# Patient Record
Sex: FEMALE | Race: WHITE | NOT HISPANIC OR LATINO | URBAN - METROPOLITAN AREA
[De-identification: names, ages, dates, MRNs, and addresses within clinical notes are randomized per-mention and may not be internally consistent; named-entity substitution may affect disease eponyms.]

---

## 2023-04-20 ENCOUNTER — INPATIENT (INPATIENT)
Facility: HOSPITAL | Age: 78
LOS: 7 days | Discharge: NURSING, REHAB OR LONG-TERM CARE | DRG: 987 | End: 2023-04-28
Attending: HOSPITALIST | Admitting: INTERNAL MEDICINE
Payer: MEDICARE

## 2023-04-20 VITALS
WEIGHT: 199.96 LBS | DIASTOLIC BLOOD PRESSURE: 70 MMHG | RESPIRATION RATE: 14 BRPM | TEMPERATURE: 97 F | HEIGHT: 66 IN | HEART RATE: 94 BPM | OXYGEN SATURATION: 98 % | SYSTOLIC BLOOD PRESSURE: 122 MMHG

## 2023-04-20 DIAGNOSIS — R60.0 LOCALIZED EDEMA: ICD-10-CM

## 2023-04-20 LAB
ANION GAP SERPL CALC-SCNC: 10 MMOL/L — SIGNIFICANT CHANGE UP (ref 7–14)
APPEARANCE UR: ABNORMAL
BACTERIA # UR AUTO: ABNORMAL
BASOPHILS # BLD AUTO: 0.07 K/UL — SIGNIFICANT CHANGE UP (ref 0–0.2)
BASOPHILS NFR BLD AUTO: 0.8 % — SIGNIFICANT CHANGE UP (ref 0–1)
BILIRUB UR-MCNC: NEGATIVE — SIGNIFICANT CHANGE UP
BUN SERPL-MCNC: 12 MG/DL — SIGNIFICANT CHANGE UP (ref 10–20)
CALCIUM SERPL-MCNC: 8.1 MG/DL — LOW (ref 8.4–10.4)
CHLORIDE SERPL-SCNC: 111 MMOL/L — HIGH (ref 98–110)
CK SERPL-CCNC: 332 U/L — HIGH (ref 0–225)
CO2 SERPL-SCNC: 22 MMOL/L — SIGNIFICANT CHANGE UP (ref 17–32)
COLOR SPEC: YELLOW — SIGNIFICANT CHANGE UP
CREAT SERPL-MCNC: 0.6 MG/DL — LOW (ref 0.7–1.5)
DIFF PNL FLD: ABNORMAL
EGFR: 92 ML/MIN/1.73M2 — SIGNIFICANT CHANGE UP
EOSINOPHIL # BLD AUTO: 0.11 K/UL — SIGNIFICANT CHANGE UP (ref 0–0.7)
EOSINOPHIL NFR BLD AUTO: 1.3 % — SIGNIFICANT CHANGE UP (ref 0–8)
EPI CELLS # UR: 4 /HPF — SIGNIFICANT CHANGE UP (ref 0–5)
GLUCOSE SERPL-MCNC: 88 MG/DL — SIGNIFICANT CHANGE UP (ref 70–99)
GLUCOSE UR QL: NEGATIVE — SIGNIFICANT CHANGE UP
HCT VFR BLD CALC: 27.2 % — LOW (ref 37–47)
HGB BLD-MCNC: 7.8 G/DL — LOW (ref 12–16)
HYALINE CASTS # UR AUTO: 1 /LPF — SIGNIFICANT CHANGE UP (ref 0–7)
IMM GRANULOCYTES NFR BLD AUTO: 0.5 % — HIGH (ref 0.1–0.3)
KETONES UR-MCNC: ABNORMAL
LEUKOCYTE ESTERASE UR-ACNC: ABNORMAL
LYMPHOCYTES # BLD AUTO: 1.5 K/UL — SIGNIFICANT CHANGE UP (ref 1.2–3.4)
LYMPHOCYTES # BLD AUTO: 17.5 % — LOW (ref 20.5–51.1)
MCHC RBC-ENTMCNC: 22.2 PG — LOW (ref 27–31)
MCHC RBC-ENTMCNC: 28.7 G/DL — LOW (ref 32–37)
MCV RBC AUTO: 77.3 FL — LOW (ref 81–99)
MONOCYTES # BLD AUTO: 0.81 K/UL — HIGH (ref 0.1–0.6)
MONOCYTES NFR BLD AUTO: 9.4 % — HIGH (ref 1.7–9.3)
NEUTROPHILS # BLD AUTO: 6.05 K/UL — SIGNIFICANT CHANGE UP (ref 1.4–6.5)
NEUTROPHILS NFR BLD AUTO: 70.5 % — SIGNIFICANT CHANGE UP (ref 42.2–75.2)
NITRITE UR-MCNC: POSITIVE
NRBC # BLD: 0 /100 WBCS — SIGNIFICANT CHANGE UP (ref 0–0)
NT-PROBNP SERPL-SCNC: HIGH PG/ML (ref 0–300)
PH UR: 6 — SIGNIFICANT CHANGE UP (ref 5–8)
PLATELET # BLD AUTO: 524 K/UL — HIGH (ref 130–400)
PMV BLD: 9.3 FL — SIGNIFICANT CHANGE UP (ref 7.4–10.4)
POTASSIUM SERPL-MCNC: 3.7 MMOL/L — SIGNIFICANT CHANGE UP (ref 3.5–5)
POTASSIUM SERPL-SCNC: 3.7 MMOL/L — SIGNIFICANT CHANGE UP (ref 3.5–5)
PROT UR-MCNC: ABNORMAL
RBC # BLD: 3.52 M/UL — LOW (ref 4.2–5.4)
RBC # FLD: 19.6 % — HIGH (ref 11.5–14.5)
RBC CASTS # UR COMP ASSIST: >720 /HPF — HIGH (ref 0–4)
SARS-COV-2 RNA SPEC QL NAA+PROBE: SIGNIFICANT CHANGE UP
SODIUM SERPL-SCNC: 143 MMOL/L — SIGNIFICANT CHANGE UP (ref 135–146)
SP GR SPEC: 1.02 — SIGNIFICANT CHANGE UP (ref 1.01–1.03)
TROPONIN T SERPL-MCNC: 0.06 NG/ML — CRITICAL HIGH
UROBILINOGEN FLD QL: ABNORMAL
WBC # BLD: 8.58 K/UL — SIGNIFICANT CHANGE UP (ref 4.8–10.8)
WBC # FLD AUTO: 8.58 K/UL — SIGNIFICANT CHANGE UP (ref 4.8–10.8)
WBC UR QL: 320 /HPF — HIGH (ref 0–5)

## 2023-04-20 PROCEDURE — 82728 ASSAY OF FERRITIN: CPT

## 2023-04-20 PROCEDURE — 84466 ASSAY OF TRANSFERRIN: CPT

## 2023-04-20 PROCEDURE — 93307 TTE W/O DOPPLER COMPLETE: CPT

## 2023-04-20 PROCEDURE — 74177 CT ABD & PELVIS W/CONTRAST: CPT

## 2023-04-20 PROCEDURE — 85045 AUTOMATED RETICULOCYTE COUNT: CPT

## 2023-04-20 PROCEDURE — 83605 ASSAY OF LACTIC ACID: CPT

## 2023-04-20 PROCEDURE — 83550 IRON BINDING TEST: CPT

## 2023-04-20 PROCEDURE — 83010 ASSAY OF HAPTOGLOBIN QUANT: CPT

## 2023-04-20 PROCEDURE — 84484 ASSAY OF TROPONIN QUANT: CPT

## 2023-04-20 PROCEDURE — 93005 ELECTROCARDIOGRAM TRACING: CPT

## 2023-04-20 PROCEDURE — 84132 ASSAY OF SERUM POTASSIUM: CPT

## 2023-04-20 PROCEDURE — 76705 ECHO EXAM OF ABDOMEN: CPT

## 2023-04-20 PROCEDURE — 88305 TISSUE EXAM BY PATHOLOGIST: CPT

## 2023-04-20 PROCEDURE — 83036 HEMOGLOBIN GLYCOSYLATED A1C: CPT

## 2023-04-20 PROCEDURE — U0005: CPT

## 2023-04-20 PROCEDURE — 82962 GLUCOSE BLOOD TEST: CPT

## 2023-04-20 PROCEDURE — 83735 ASSAY OF MAGNESIUM: CPT

## 2023-04-20 PROCEDURE — 86850 RBC ANTIBODY SCREEN: CPT

## 2023-04-20 PROCEDURE — 80061 LIPID PANEL: CPT

## 2023-04-20 PROCEDURE — 83540 ASSAY OF IRON: CPT

## 2023-04-20 PROCEDURE — 88341 IMHCHEM/IMCYTCHM EA ADD ANTB: CPT

## 2023-04-20 PROCEDURE — 87640 STAPH A DNA AMP PROBE: CPT

## 2023-04-20 PROCEDURE — 97535 SELF CARE MNGMENT TRAINING: CPT | Mod: GO

## 2023-04-20 PROCEDURE — 71045 X-RAY EXAM CHEST 1 VIEW: CPT | Mod: 26

## 2023-04-20 PROCEDURE — 97110 THERAPEUTIC EXERCISES: CPT | Mod: GP

## 2023-04-20 PROCEDURE — 86901 BLOOD TYPING SEROLOGIC RH(D): CPT

## 2023-04-20 PROCEDURE — 82607 VITAMIN B-12: CPT

## 2023-04-20 PROCEDURE — 84295 ASSAY OF SERUM SODIUM: CPT

## 2023-04-20 PROCEDURE — 86900 BLOOD TYPING SEROLOGIC ABO: CPT

## 2023-04-20 PROCEDURE — 85025 COMPLETE CBC W/AUTO DIFF WBC: CPT

## 2023-04-20 PROCEDURE — 82553 CREATINE MB FRACTION: CPT

## 2023-04-20 PROCEDURE — 87493 C DIFF AMPLIFIED PROBE: CPT

## 2023-04-20 PROCEDURE — 85027 COMPLETE CBC AUTOMATED: CPT

## 2023-04-20 PROCEDURE — 71045 X-RAY EXAM CHEST 1 VIEW: CPT

## 2023-04-20 PROCEDURE — 85014 HEMATOCRIT: CPT

## 2023-04-20 PROCEDURE — 85018 HEMOGLOBIN: CPT

## 2023-04-20 PROCEDURE — 87641 MR-STAPH DNA AMP PROBE: CPT

## 2023-04-20 PROCEDURE — 93880 EXTRACRANIAL BILAT STUDY: CPT

## 2023-04-20 PROCEDURE — 97166 OT EVAL MOD COMPLEX 45 MIN: CPT | Mod: GO

## 2023-04-20 PROCEDURE — 80053 COMPREHEN METABOLIC PANEL: CPT

## 2023-04-20 PROCEDURE — 99285 EMERGENCY DEPT VISIT HI MDM: CPT

## 2023-04-20 PROCEDURE — 76856 US EXAM PELVIC COMPLETE: CPT

## 2023-04-20 PROCEDURE — U0003: CPT

## 2023-04-20 PROCEDURE — 99223 1ST HOSP IP/OBS HIGH 75: CPT

## 2023-04-20 PROCEDURE — 71275 CT ANGIOGRAPHY CHEST: CPT

## 2023-04-20 PROCEDURE — 82330 ASSAY OF CALCIUM: CPT

## 2023-04-20 PROCEDURE — 82803 BLOOD GASES ANY COMBINATION: CPT

## 2023-04-20 PROCEDURE — 36415 COLL VENOUS BLD VENIPUNCTURE: CPT

## 2023-04-20 PROCEDURE — 97162 PT EVAL MOD COMPLEX 30 MIN: CPT | Mod: GP

## 2023-04-20 PROCEDURE — 88360 TUMOR IMMUNOHISTOCHEM/MANUAL: CPT

## 2023-04-20 PROCEDURE — 86923 COMPATIBILITY TEST ELECTRIC: CPT

## 2023-04-20 PROCEDURE — 88342 IMHCHEM/IMCYTCHM 1ST ANTB: CPT

## 2023-04-20 PROCEDURE — P9016: CPT

## 2023-04-20 PROCEDURE — 84100 ASSAY OF PHOSPHORUS: CPT

## 2023-04-20 PROCEDURE — 93970 EXTREMITY STUDY: CPT | Mod: 26

## 2023-04-20 PROCEDURE — 36430 TRANSFUSION BLD/BLD COMPNT: CPT

## 2023-04-20 PROCEDURE — 74174 CTA ABD&PLVS W/CONTRAST: CPT

## 2023-04-20 PROCEDURE — 82746 ASSAY OF FOLIC ACID SERUM: CPT

## 2023-04-20 NOTE — ED PROVIDER NOTE - OBJECTIVE STATEMENT
No Pt is a 77 y.o Female with PMHx of HTN, atrial tachycardia, chronic venous stasis, OA BIBEMS from assisted living for chief complaint of fall. Pt states she is here because her doctor told her to come in for evaluation of her legs which she states have been swelling up. Per pt, she did fall earlier today but she was sliding to the side of her couch and landed on her buttock. Denies any pain, head injury or being on any blood thinners. Pt denies any urinary or bowel incontinence , back or neck pain, numbness, tingling or focal weakness. pt states she has been experiencing difficulty walking because of her legs being swollen.. Pt states her legs are seeping fluid. Denies any cough, SOB, CP dysuria or fevers. Pt is a 77 y.o Female with PMHx of HTN, atrial stenosis, atrial tachycardia, chronic venous stasis, OA BIBEMS from assisted living for chief complaint of fall. Pt states she is here because her doctor told her to come in for evaluation of her legs which she states have been swelling up. Per pt, she did fall earlier today but she was sliding to the side of her couch and landed on her buttock. Denies any pain, head injury or being on any blood thinners. Pt denies any urinary or bowel incontinence , back or neck pain, numbness, tingling or focal weakness. pt states she has been experiencing difficulty walking because of her legs being swollen.. Pt states her legs are seeping fluid. Denies any cough, SOB, CP dysuria or fevers.

## 2023-04-20 NOTE — ED PROVIDER NOTE - CLINICAL SUMMARY MEDICAL DECISION MAKING FREE TEXT BOX
77-year-old female presents the emergency department for lower extremity swelling with deconditioning and secondary fall.  Is noncompliant with outpatient regimen for wound care and leg edema.  Daughter is bedside states patient has limited medical literacy due to dementia.  No other symptoms.  Daughter states she is uncomfortable with the patient's care at the skilled nursing facility due to her recurrence of symptoms, prefers inpatient management, states she cannot get the patient to the outpatient visits if discharged. Had screening labs imaging medications and reevaluation, plan is for inpatient admission for continued management.

## 2023-04-20 NOTE — ED PROVIDER NOTE - PHYSICAL EXAMINATION
CONSTITUTIONAL: NAD  SKIN: Warm dry  HEAD: NCAT  EYES: NL inspection  ENT: MMM  NECK: Supple; non tender. no cervical ttp no stepoffs  CARD: RRR  RESP: CTAB  ABD: S/NT no R/G  BACK: nontender no midline ttp no step offs noted  EXT: + b/l lower symmetrical extremity pitting edema 2+ with overlying dry skin changes. No warmth to touch or calf ttp. sensation intact motor 5/5  NEURO: Grossly unremarkable CN II-XIII  PSYCH: Cooperative, appropriate.

## 2023-04-20 NOTE — ED ADULT NURSE NOTE - INTERVENTIONS DEFINITIONS
[place] to call system/Call bell, personal items and telephone within reach/Instruct patient to call for assistance/Physically safe environment: no spills, clutter or unnecessary equipment/Stretcher in lowest position, wheels locked, appropriate side rails in place/Provide visual cue, wrist band, yellow gown, etc./Monitor gait and stability/Monitor for mental status changes and reorient to person, place, and time/Reinforce activity limits and safety measures with patient and family

## 2023-04-20 NOTE — ED PROVIDER NOTE - CARE PLAN
1 Principal Discharge DX:	Bilateral lower extremity edema  Secondary Diagnosis:	Elevated troponin  Secondary Diagnosis:	Acute UTI  Secondary Diagnosis:	Unable to ambulate

## 2023-04-20 NOTE — ED ADULT TRIAGE NOTE - CHIEF COMPLAINT QUOTE
pt bibems from assisted living for a trip and fall co weakness , denies head trauma and AC. typically ambulates with walker was not ambulatory on scene

## 2023-04-20 NOTE — CONSULT NOTE ADULT - SUBJECTIVE AND OBJECTIVE BOX
VASCULAR SURGERY CONSULT NOTE      HPI: Pt is a 77 y.o Female with PMHx of HTN, atrial tachycardia, aortic stenosis, chronic venous stasis, OA BIBEMS from assisted living for chief complaint of fall. Patient presents with her daughter and states she is here because she was unable to make an appointment with Dr. Mathis in the office earlier today due to worsening lower extremity swelling. The patient's daughter reports that she is a long term patient of Dr. Mathis and that she has no vascular surgical history in the past but that he has been wrapping her bilateral extremities in Unna boots. Patient reports that she did fall earlier today but she was sliding to the side of her couch and landed on her buttock. Denies any pain, head injury or being on any blood thinners. Pateint denies any urinary or bowel incontinence , back or neck pain, numbness, tingling or focal weakness. Patient states she has been experiencing difficulty walking because of her legs being swollen. Pt states her legs are seeping fluid. Denies any cough, SOB, CP dysuria or fevers.        PAST MEDICAL & SURGICAL HISTORY:    Allergy Status Unknown    Home Medications:    No permtinent family history of PVD    REVIEW OF SYSTEMS:  GENERAL:                                         negative  SKIN:                                                 negative  OPTHALMOLOGIC:                          negative  ENMT:                                               negative  RESPIRATORY AND THORAX:        negative  CARDIOVASCULAR:                         negative  GASTROINTESTINAL:                       negative  NEPHROLOGY:                                  negative  MUSCULOSKELETAL:                       negative  NEUROLOGIC:                                   negative  PSYCHIATRIC:                                    negative  HEMATOLOGY/LYMPHATICS:         negative  ENDOCRINE:                                     negative  ALLERGIC/IMMUNOLOGIC:            negative    12 point ROS otherwise normal except as stated in HPI    PHYSICAL EXAM  Vital Signs Last 24 Hrs  T(C): 36.3 (20 Apr 2023 14:27), Max: 36.3 (20 Apr 2023 14:27)  T(F): 97.3 (20 Apr 2023 14:27), Max: 97.3 (20 Apr 2023 14:27)  HR: 94 (20 Apr 2023 14:27) (94 - 94)  BP: 122/70 (20 Apr 2023 14:27) (122/70 - 122/70)  BP(mean): --  RR: 14 (20 Apr 2023 14:27) (14 - 14)  SpO2: 98% (20 Apr 2023 14:27) (98% - 98%)    Parameters below as of 20 Apr 2023 14:27  Patient On (Oxygen Delivery Method): room air        Appearance: Normal		  Cardiovascular: Normal S1 S2  Respiratory: Lungs clear to auscultation  Gastrointestinal:  Soft, Non-tender, positive BS	  Skin: No rashes, No ecchymoses, No cyanosis  Extremities: Normal range of motion, No clubbing, cyanosis. Bilateral legs wrapped in unna boots bilaterally, significant swelling of BLE  Vascular: Peripheral pulses palpable 2+ bilaterally  Neurologic: Non-focal        PULSES:  Femoral: palpable bilaterally  Dorsal Pedal: palpable bilaterally  Posterior Tibial: palpable bilaterally  Capillary: delayed refill    MEDICATIONS:   MEDICATIONS  (STANDING):    MEDICATIONS  (PRN):      LAB/STUDIES:                        7.8    8.58  )-----------( 524      ( 20 Apr 2023 17:03 )             27.2     04-20    143  |  111<H>  |  12  ----------------------------<  88  3.7   |  22  |  0.6<L>    Ca    8.1<L>      20 Apr 2023 17:03              CARDIAC MARKERS ( 20 Apr 2023 17:15 )  x     / 0.06 ng/mL / x     / x     / x      CARDIAC MARKERS ( 20 Apr 2023 17:03 )  x     / x     / 332 U/L / x     / x            IMAGING:  Pending

## 2023-04-20 NOTE — ED PROVIDER NOTE - CONSIDERATION OF ADMISSION OBSERVATION
Patient will be admitted for inability to ambulate and inability get outpatient management,  Due to cognitive impairment Consideration of Admission/Observation

## 2023-04-20 NOTE — ED ADULT NURSE REASSESSMENT NOTE - NS ED NURSE REASSESS COMMENT FT1
Pt alert and oriented x3. Airway patent with equal respirations. No distress noted. Arom x 4 extremities. Upon attempting to place pt on bed pan, pt found to be sitting in urine in a very old diaper that has tears and absorbent beads from the diapers scattered all over the pt perineal area and thighs,  Pt unable to remember how long she was in this diaper. Pt has redness and irritation thought-out the perineal area and is experiencing pain while being cleaned. Pt cleaned and changed. Urine sample obtained, Placed on prima fit. Educated on prima fit usage. Pt made comfortable. Pt very grateful for the assistance.

## 2023-04-20 NOTE — CONSULT NOTE ADULT - ASSESSMENT
ASSESSMENT:  Pt is a 77 y.o Female with PMHx of HTN, atrial tachycardia, aortic stenosis, chronic venous stasis, OA BIBEMS from assisted living for chief complaint of fall.   Vascular surgery consulted for chronic venous statis and LE swelling    PLAN:   - No acute surgical intervention  - Re-wrap bilateral lower extremities in kerlix, ace bandaging  - Patient seen/examined or Plan Discussed with Fellow, Dr. Fernandes  - Plan to be discussed with Attending, Dr. Mathis    SPECTRA [id number]

## 2023-04-20 NOTE — ED ADULT NURSE NOTE - OBJECTIVE STATEMENT
Pt BIBA s/p trip and fall at assisted living. Pt denies HT, LOC, - AC use. Pt ambulates with walker at home

## 2023-04-21 LAB
ALBUMIN SERPL ELPH-MCNC: 2.4 G/DL — LOW (ref 3.5–5.2)
ALP SERPL-CCNC: 78 U/L — SIGNIFICANT CHANGE UP (ref 30–115)
ALT FLD-CCNC: 12 U/L — SIGNIFICANT CHANGE UP (ref 0–41)
ANION GAP SERPL CALC-SCNC: 9 MMOL/L — SIGNIFICANT CHANGE UP (ref 7–14)
AST SERPL-CCNC: 27 U/L — SIGNIFICANT CHANGE UP (ref 0–41)
BASE EXCESS BLDA CALC-SCNC: 3.4 MMOL/L — HIGH (ref -2–3)
BASOPHILS # BLD AUTO: 0.04 K/UL — SIGNIFICANT CHANGE UP (ref 0–0.2)
BASOPHILS NFR BLD AUTO: 0.5 % — SIGNIFICANT CHANGE UP (ref 0–1)
BILIRUB SERPL-MCNC: 0.5 MG/DL — SIGNIFICANT CHANGE UP (ref 0.2–1.2)
BUN SERPL-MCNC: 15 MG/DL — SIGNIFICANT CHANGE UP (ref 10–20)
CALCIUM SERPL-MCNC: 8.3 MG/DL — LOW (ref 8.4–10.4)
CHLORIDE SERPL-SCNC: 107 MMOL/L — SIGNIFICANT CHANGE UP (ref 98–110)
CO2 SERPL-SCNC: 24 MMOL/L — SIGNIFICANT CHANGE UP (ref 17–32)
CREAT SERPL-MCNC: 0.8 MG/DL — SIGNIFICANT CHANGE UP (ref 0.7–1.5)
EGFR: 75 ML/MIN/1.73M2 — SIGNIFICANT CHANGE UP
EOSINOPHIL # BLD AUTO: 0.11 K/UL — SIGNIFICANT CHANGE UP (ref 0–0.7)
EOSINOPHIL NFR BLD AUTO: 1.4 % — SIGNIFICANT CHANGE UP (ref 0–8)
GAS PNL BLDA: SIGNIFICANT CHANGE UP
GLUCOSE SERPL-MCNC: 96 MG/DL — SIGNIFICANT CHANGE UP (ref 70–99)
HAPTOGLOB SERPL-MCNC: 190 MG/DL — SIGNIFICANT CHANGE UP (ref 34–200)
HCO3 BLDA-SCNC: 27 MMOL/L — SIGNIFICANT CHANGE UP (ref 21–28)
HCT VFR BLD CALC: 25.4 % — LOW (ref 37–47)
HGB BLD-MCNC: 7.4 G/DL — LOW (ref 12–16)
IMM GRANULOCYTES NFR BLD AUTO: 0.5 % — HIGH (ref 0.1–0.3)
IRON SATN MFR SERPL: 15 UG/DL — LOW (ref 35–150)
IRON SATN MFR SERPL: 16 UG/DL — LOW (ref 35–150)
IRON SATN MFR SERPL: 6 % — LOW (ref 15–50)
IRON SATN MFR SERPL: 7 % — LOW (ref 15–50)
LYMPHOCYTES # BLD AUTO: 1.57 K/UL — SIGNIFICANT CHANGE UP (ref 1.2–3.4)
LYMPHOCYTES # BLD AUTO: 20.6 % — SIGNIFICANT CHANGE UP (ref 20.5–51.1)
MAGNESIUM SERPL-MCNC: 1.8 MG/DL — SIGNIFICANT CHANGE UP (ref 1.8–2.4)
MCHC RBC-ENTMCNC: 22.8 PG — LOW (ref 27–31)
MCHC RBC-ENTMCNC: 29.1 G/DL — LOW (ref 32–37)
MCV RBC AUTO: 78.4 FL — LOW (ref 81–99)
MONOCYTES # BLD AUTO: 0.74 K/UL — HIGH (ref 0.1–0.6)
MONOCYTES NFR BLD AUTO: 9.7 % — HIGH (ref 1.7–9.3)
NEUTROPHILS # BLD AUTO: 5.13 K/UL — SIGNIFICANT CHANGE UP (ref 1.4–6.5)
NEUTROPHILS NFR BLD AUTO: 67.3 % — SIGNIFICANT CHANGE UP (ref 42.2–75.2)
NRBC # BLD: 0 /100 WBCS — SIGNIFICANT CHANGE UP (ref 0–0)
PCO2 BLDA: 36 MMHG — SIGNIFICANT CHANGE UP (ref 25–48)
PH BLDA: 7.48 — HIGH (ref 7.35–7.45)
PHOSPHATE SERPL-MCNC: 3.9 MG/DL — SIGNIFICANT CHANGE UP (ref 2.1–4.9)
PLATELET # BLD AUTO: 481 K/UL — HIGH (ref 130–400)
PMV BLD: 10.2 FL — SIGNIFICANT CHANGE UP (ref 7.4–10.4)
PO2 BLDA: 60 MMHG — LOW (ref 83–108)
POTASSIUM SERPL-MCNC: 3.5 MMOL/L — SIGNIFICANT CHANGE UP (ref 3.5–5)
POTASSIUM SERPL-SCNC: 3.5 MMOL/L — SIGNIFICANT CHANGE UP (ref 3.5–5)
PROT SERPL-MCNC: 5.3 G/DL — LOW (ref 6–8)
RBC # BLD: 3.24 M/UL — LOW (ref 4.2–5.4)
RBC # BLD: 3.24 M/UL — LOW (ref 4.2–5.4)
RBC # FLD: 19.7 % — HIGH (ref 11.5–14.5)
RETICS #: 62.9 K/UL — SIGNIFICANT CHANGE UP (ref 25–125)
RETICS/RBC NFR: 1.9 % — HIGH (ref 0.5–1.5)
SAO2 % BLDA: 92.8 % — LOW (ref 94–98)
SODIUM SERPL-SCNC: 140 MMOL/L — SIGNIFICANT CHANGE UP (ref 135–146)
TIBC SERPL-MCNC: 217 UG/DL — LOW (ref 220–430)
TIBC SERPL-MCNC: 235 UG/DL — SIGNIFICANT CHANGE UP (ref 220–430)
TROPONIN T SERPL-MCNC: 0.07 NG/ML — CRITICAL HIGH
TROPONIN T SERPL-MCNC: 0.15 NG/ML — CRITICAL HIGH
TROPONIN T SERPL-MCNC: 0.22 NG/ML — CRITICAL HIGH
TROPONIN T SERPL-MCNC: 0.22 NG/ML — CRITICAL HIGH
UIBC SERPL-MCNC: 201 UG/DL — SIGNIFICANT CHANGE UP (ref 110–370)
UIBC SERPL-MCNC: 220 UG/DL — SIGNIFICANT CHANGE UP (ref 110–370)
WBC # BLD: 7.63 K/UL — SIGNIFICANT CHANGE UP (ref 4.8–10.8)
WBC # FLD AUTO: 7.63 K/UL — SIGNIFICANT CHANGE UP (ref 4.8–10.8)

## 2023-04-21 PROCEDURE — 93306 TTE W/DOPPLER COMPLETE: CPT | Mod: 26

## 2023-04-21 PROCEDURE — 99233 SBSQ HOSP IP/OBS HIGH 50: CPT

## 2023-04-21 PROCEDURE — 71045 X-RAY EXAM CHEST 1 VIEW: CPT | Mod: 26

## 2023-04-21 RX ORDER — ASPIRIN/CALCIUM CARB/MAGNESIUM 324 MG
81 TABLET ORAL DAILY
Refills: 0 | Status: DISCONTINUED | OUTPATIENT
Start: 2023-04-21 | End: 2023-04-26

## 2023-04-21 RX ORDER — METOPROLOL TARTRATE 50 MG
12.5 TABLET ORAL
Refills: 0 | Status: DISCONTINUED | OUTPATIENT
Start: 2023-04-21 | End: 2023-04-28

## 2023-04-21 RX ORDER — METOPROLOL TARTRATE 50 MG
12.5 TABLET ORAL
Refills: 0 | Status: DISCONTINUED | OUTPATIENT
Start: 2023-04-21 | End: 2023-04-21

## 2023-04-21 RX ORDER — FUROSEMIDE 40 MG
40 TABLET ORAL ONCE
Refills: 0 | Status: DISCONTINUED | OUTPATIENT
Start: 2023-04-21 | End: 2023-04-21

## 2023-04-21 RX ORDER — PANTOPRAZOLE SODIUM 20 MG/1
40 TABLET, DELAYED RELEASE ORAL
Refills: 0 | Status: DISCONTINUED | OUTPATIENT
Start: 2023-04-21 | End: 2023-04-28

## 2023-04-21 RX ORDER — METOPROLOL TARTRATE 50 MG
12.5 TABLET ORAL ONCE
Refills: 0 | Status: DISCONTINUED | OUTPATIENT
Start: 2023-04-21 | End: 2023-04-21

## 2023-04-21 RX ORDER — CEFTRIAXONE 500 MG/1
1000 INJECTION, POWDER, FOR SOLUTION INTRAMUSCULAR; INTRAVENOUS EVERY 24 HOURS
Refills: 0 | Status: COMPLETED | OUTPATIENT
Start: 2023-04-21 | End: 2023-04-23

## 2023-04-21 RX ORDER — FUROSEMIDE 40 MG
40 TABLET ORAL DAILY
Refills: 0 | Status: DISCONTINUED | OUTPATIENT
Start: 2023-04-22 | End: 2023-04-28

## 2023-04-21 RX ORDER — FUROSEMIDE 40 MG
40 TABLET ORAL
Refills: 0 | Status: DISCONTINUED | OUTPATIENT
Start: 2023-04-21 | End: 2023-04-21

## 2023-04-21 RX ORDER — METOPROLOL TARTRATE 50 MG
25 TABLET ORAL
Refills: 0 | Status: DISCONTINUED | OUTPATIENT
Start: 2023-04-21 | End: 2023-04-21

## 2023-04-21 RX ORDER — FUROSEMIDE 40 MG
20 TABLET ORAL DAILY
Refills: 0 | Status: DISCONTINUED | OUTPATIENT
Start: 2023-04-21 | End: 2023-04-21

## 2023-04-21 RX ORDER — ATORVASTATIN CALCIUM 80 MG/1
40 TABLET, FILM COATED ORAL AT BEDTIME
Refills: 0 | Status: DISCONTINUED | OUTPATIENT
Start: 2023-04-21 | End: 2023-04-28

## 2023-04-21 RX ORDER — ENOXAPARIN SODIUM 100 MG/ML
40 INJECTION SUBCUTANEOUS EVERY 24 HOURS
Refills: 0 | Status: DISCONTINUED | OUTPATIENT
Start: 2023-04-21 | End: 2023-04-28

## 2023-04-21 RX ORDER — IRON SUCROSE 20 MG/ML
200 INJECTION, SOLUTION INTRAVENOUS EVERY 24 HOURS
Refills: 0 | Status: COMPLETED | OUTPATIENT
Start: 2023-04-22 | End: 2023-04-26

## 2023-04-21 RX ADMIN — PANTOPRAZOLE SODIUM 40 MILLIGRAM(S): 20 TABLET, DELAYED RELEASE ORAL at 18:19

## 2023-04-21 RX ADMIN — Medication 40 MILLIGRAM(S): at 09:19

## 2023-04-21 RX ADMIN — CEFTRIAXONE 100 MILLIGRAM(S): 500 INJECTION, POWDER, FOR SOLUTION INTRAMUSCULAR; INTRAVENOUS at 15:03

## 2023-04-21 RX ADMIN — Medication 20 MILLIGRAM(S): at 05:58

## 2023-04-21 RX ADMIN — Medication 40 MILLIGRAM(S): at 15:04

## 2023-04-21 RX ADMIN — Medication 12.5 MILLIGRAM(S): at 10:29

## 2023-04-21 NOTE — CHART NOTE - NSCHARTNOTEFT_GEN_A_CORE
77 y.o Female with PMHx of HTN, Aortic stenosis, atrial tachycardia, chronic venous stasis and OA BIBEMS for chief complaint of fall. She was sent in by her doctor for evaluation of her leg swelling. She fell while sliding to the side of her couch and landed on her buttock. She states she has been experiencing difficulty walking because of her legs being swollen and she feels a burning pain in her left ankle. Her legs are seeping fluid, and has bilateral ulcers. Denies any pain, head injury or being on any blood thinners. Pt denies any urinary or bowel incontinence , back or neck pain, numbness, tingling or focal weakness.    In the ED VS stable, was initially on RA  trops 0.06 ===>0.22   lact 1.1   Hb 7.4   BNP 51311  UA: large LE    many bacteria    nitrite +ve   320 wbc     bl duplex NG  Chest X-Ray: perihilar opacities  EKG wnl    Bl LE swelling likely secondary to diastolic HF due to critical AS  TTE here showed:  Hyperdynamic global LV systolic function        EF of 75 %        Severe biatrial dilation        Severe 'critical' aortic valve stenosis due to fibrocalcific AV. Vmax 5.49 m/s, MG 74 mmHg, and HARRIET 0.63cm^2     severe pHTN: pulmonary artery systolic pressure is 80.9 mmHg   - started her on     metoprolol 12.5     Lasix 40 IV qd      atorvastatin 40qd     aspirin 81qd  - seen by Dr Walker: cardiac cath + TAVR when stable    In the ED pt started desatting to 91 and her BP has been soft (90s/50s)  SIRS negative   Hypotension likely due to critical aortic stenosis  Chest X-Ray appears comparatively worse when compared to one at admission  spoke to cardiac fellow and agreed that pt needs CCU level of care    A+P:    77 y.o Female with PMHx of HTN, Aortic stenosis, atrial tachycardia, chronic venous stasis and OA BIBEMS for chief complaint of fall.    1. Mechanicall fall associated with possible Acute UTI   - Admit to medicine/Telemetry       -ECG: no ischemic changes        - u/a:positive  - Start Rocephin   - Urine cx:pending  - Vascular consult appreciated   - Elevate Legs and ACE wrap them   - PT/OT:pending      2. Possible mild acute diastolic CHF complicated by demand ischemia associated with critical aortic stenosis   - Telemetry            - ECG: TWI lateral leads             - CXR: cardiomegaly and vascular redistribution. BNP 93628   - Start aspirin daily   - Start Atorvastatin 40mg HS   - Start low dose metoprolol   - Start lasix 40mg IV daily  - I will hold on therapeutic heparin awaiting cardio due to significant iron def anemia   - Cyclic cardiac enzymes detectable trending upward   - I would hold on heparin IV until   - Echocardio:  a) Severe 'critical' aortic valve stenosis due to fibrocalcific AV. Vmax 5.49 m/s, MG 74 mmHg, and HARRIET 0.63cm^2.  - Cardio consult:pending (ordered urgently. Spoke to . he is aware about the patient, critical aortic stenosis and positive trop)    - Cardio consult strutural heart disease:pending     2. Microcytic anemia  due to iron def anemia   - PPI bid          - Guaiac:pending   - Iron profile: in favor of iron def   - Start venofer 200g IV daily 24 hours post-abs starting tmr    3. AS. Hx of Atrial tachycardias / HTN  - No home medications for HTN     4.  OA  - stable    #DVT - lovenox sq   #GI - PPI bid   #Diet - DASH  #Activity - IAT   #Code - Full code

## 2023-04-21 NOTE — CONSULT NOTE ADULT - ASSESSMENT
78 y/o   female history of  severe  AS   patient  was  sent for eval TAVR   however patient did not go   presents for fall    no chest pain   no syncope        elevated trops   echo   critical AS    pulm hypertension    normal LVF         anemia     UTI     rec  adm tele    serial ECG  enzymes      transfuse  PRBC       treat UTI        when stable   will need cardiac  cath and TAVR   eval

## 2023-04-21 NOTE — H&P ADULT - HISTORY OF PRESENT ILLNESS
77 y.o Female with PMHx of HTN, atrial stenosis, atrial tachycardia, chronic venous stasis, OA BIBEMS from assisted living for chief complaint of fall. Pt states she is here because her doctor told her to come in for evaluation of her legs which she states have been swelling up. Per pt, she did fall earlier today but she was sliding to the side of her couch and landed on her buttock. Denies any pain, head injury or being on any blood thinners. Pt denies any urinary or bowel incontinence , back or neck pain, numbness, tingling or focal weakness. pt states she has been experiencing difficulty walking because of her legs being swollen.. Pt states her legs are seeping fluid. Denies any cough, SOB, CP dysuria or fevers.    Vital Signs Last 24 Hrs  T(C): 36.3 (20 Apr 2023 14:27), Max: 36.3 (20 Apr 2023 14:27)  T(F): 97.3 (20 Apr 2023 14:27), Max: 97.3 (20 Apr 2023 14:27)  HR: 94 (20 Apr 2023 14:27) (94 - 94)  BP: 122/70 (20 Apr 2023 14:27) (122/70 - 122/70)  BP(mean): --  RR: 14 (20 Apr 2023 14:27) (14 - 14)  SpO2: 98% (20 Apr 2023 14:27) (98% - 98%)    Parameters below as of 20 Apr 2023 14:27  Patient On (Oxygen Delivery Method): room air     77 y.o Female with PMHx of HTN, Aortic stenosi, atrial tachycardia, chronic venous stasis and OA BIBEMS for chief complaint of fall. Patient states she is here because her doctor told her to come in for evaluation of her legs which she states have been swelling up. She did fall earlier today but she was sliding to the side of her couch and landed on her buttock. She states she has been experiencing difficulty walking because of her legs being swollen and she feels a burning pain in her left ankle. Her legs are seeping fluid, and has bilateral ulcers. Denies any pain, head injury or being on any blood thinners. Pt denies any urinary or bowel incontinence , back or neck pain, numbness, tingling or focal weakness.    Vital Signs Last 24 Hrs  T(C): 36.3 (20 Apr 2023 14:27), Max: 36.3 (20 Apr 2023 14:27)  T(F): 97.3 (20 Apr 2023 14:27), Max: 97.3 (20 Apr 2023 14:27)  HR: 94 (20 Apr 2023 14:27) (94 - 94)  BP: 122/70 (20 Apr 2023 14:27) (122/70 - 122/70)  BP(mean): --  RR: 14 (20 Apr 2023 14:27) (14 - 14)  SpO2: 98% (20 Apr 2023 14:27) (98% - 98%)    Parameters below as of 20 Apr 2023 14:27  Patient On (Oxygen Delivery Method): room air     77 y.o Female with PMHx of HTN, Aortic stenosis, atrial tachycardia, chronic venous stasis and OA BIBEMS for chief complaint of fall. Patient states she is here because her doctor told her to come in for evaluation of her legs which she states have been swelling up. She did fall earlier today but she was sliding to the side of her couch and landed on her buttock. She states she has been experiencing difficulty walking because of her legs being swollen and she feels a burning pain in her left ankle. Her legs are seeping fluid, and has bilateral ulcers. Denies any pain, head injury or being on any blood thinners. Pt denies any urinary or bowel incontinence , back or neck pain, numbness, tingling or focal weakness.    Vital Signs Last 24 Hrs  T(C): 36.3 (20 Apr 2023 14:27), Max: 36.3 (20 Apr 2023 14:27)  T(F): 97.3 (20 Apr 2023 14:27), Max: 97.3 (20 Apr 2023 14:27)  HR: 94 (20 Apr 2023 14:27) (94 - 94)  BP: 122/70 (20 Apr 2023 14:27) (122/70 - 122/70)  BP(mean): --  RR: 14 (20 Apr 2023 14:27) (14 - 14)  SpO2: 98% (20 Apr 2023 14:27) (98% - 98%)    Parameters below as of 20 Apr 2023 14:27  Patient On (Oxygen Delivery Method): room air

## 2023-04-21 NOTE — PHYSICAL THERAPY INITIAL EVALUATION ADULT - PERTINENT HX OF CURRENT PROBLEM, REHAB EVAL
77 y.o Female with PMHx of HTN, Aortic stenosis, atrial tachycardia, chronic venous stasis and OA BIBEMS for chief complaint of fall.    1. Mechanicall fall associated with possible Acute UTI

## 2023-04-21 NOTE — CHART NOTE - NSCHARTNOTEFT_GEN_A_CORE
Unable to confirm pt's med rec. Daughter does not have list. Have called [facility] on [address] multiple times and unable to reach pharmacy staff.  Reached out to the office of Dr. Gupta (pt's PCP) who said pt takes lisinopril 30, oxybutin 5, and risperidone 0.25 but this does not seem accurate. Per staff, this pt was last seen in the office in November so there is possibly a more updated list.

## 2023-04-21 NOTE — H&P ADULT - NSHPPHYSICALEXAM_GEN_ALL_CORE
T(C): 36.3 (04-20-23 @ 14:27), Max: 36.3 (04-20-23 @ 14:27)  HR: 94 (04-20-23 @ 14:27) (94 - 94)  BP: 122/70 (04-20-23 @ 14:27) (122/70 - 122/70)  RR: 14 (04-20-23 @ 14:27) (14 - 14)  SpO2: 98% (04-20-23 @ 14:27) (98% - 98%)    CONSTITUTIONAL: Well groomed, no apparent distress  RESP: No respiratory distress, no use of accessory muscles; CTA b/l, no WRR  CV: RRR, +S1S2, no MRG; no JVD; no peripheral edema  GI: Soft, NT, ND, no rebound, no guarding; no palpable masses  PSYCH: Appropriate insight/judgment; A+O x 3 T(C): 36.3 (04-20-23 @ 14:27), Max: 36.3 (04-20-23 @ 14:27)  HR: 94 (04-20-23 @ 14:27) (94 - 94)  BP: 122/70 (04-20-23 @ 14:27) (122/70 - 122/70)  RR: 14 (04-20-23 @ 14:27) (14 - 14)  SpO2: 98% (04-20-23 @ 14:27) (98% - 98%)    CONSTITUTIONAL: Well groomed, no apparent distress  RESP: No respiratory distress, no use of accessory muscles; CTA b/l, no WRR  CV: RRR, +S1S2, sistolic murmur; no  bilateral nonpitting LE edma up to knee levels   GI: Soft, NT, ND, no rebound, no guarding; no palpable masses  PSYCH: Appropriate insight/judgment; A+O x 3

## 2023-04-21 NOTE — H&P ADULT - ATTENDING COMMENTS
77 y.o Female with PMHx of HTN, Aortic stenosis, atrial tachycardia, chronic venous stasis and OA BIBEMS for chief complaint of fall.    Agree  with assessment  except for changes below.     IMPRESSION 77 y.o Female with PMHx of HTN, Aortic stenosis, atrial tachycardia, chronic venous stasis and OA BIBEMS for chief complaint  is  Lower Extremity  Swelling. Of note patient slid off her couch  and clarified that she did not fall.  Her primary complaint is the her lower   extremity  swelling,     Agree  with assessment  except for changes below.     IMPRESSION  Chronic  Venous Stasis   Bilateral  LE  edema Non-Pitting   No Respiratory Distress   BNP 17K Unknown baseline   Re-wrap bilateral lower extremities in kerlix, ace Bandaging  Leg Elevation       Hx AS Atrial  Tachycardia, HTN   - BNP 17k  No known Baseline  - No  active Chest pain    - Elevated  Trop  Stable on  repeat  in the setting of anemia   - ECG showing Atrial Tachycardia, ST Depression  V1- V5 Chronicity Unknown   - f/u echo   -Trend trop, Serial ECG   - Cardiology Eval     Asymptomatic bacteriuria   - U/A Grossly positive. No sympt   - f/u Ucx   - monitor off abx     Microcytic Anemia  UA Positive Hematuria   Baseline hemoglobin: Unknown  Hemoglobin today: 7.8  Maintain 2 peripheral 18-gauge IVs,  Keep active type and screen, Monitor Hemoglobin/hematocrit     Suspected  Microscopic Hematuria   Consult Urology  for Possible cystoscope        Hx OA - Stable 77 y.o Female with PMHx of HTN, Aortic stenosis, atrial tachycardia, chronic venous stasis and OA BIBEMS for chief complaint  is  Lower Extremity  Swelling. Of note patient slid off her couch  and clarified that she did not fall.  Her primary complaint is the her lower   extremity  swelling,     Agree  with assessment  except for changes below.     IMPRESSION  Chronic  Venous Stasis   Bilateral  LE  edema Non-Pitting   No Respiratory Distress   BNP 17K Unknown baseline   Re-wrap bilateral lower extremities in kerlix, ace Bandaging  Leg Elevation       Hx AS Atrial  Tachycardia, HTN   - R/o ACS   - BNP 17k  No known Baseline  - No  active Chest pain    - Elevated  Trop  Stable on  repeat  in the setting of anemia   - ECG showing Atrial Tachycardia, ST Depression  V1- V5 Chronicity Unknown   - f/u echo   -Trend trop, Serial ECG   - Monitor on Tele   - Cardiology Eval     Asymptomatic bacteriuria   - U/A Grossly positive. No sympt   - f/u Ucx   - monitor off abx     Microcytic Anemia  UA Positive Hematuria   Baseline hemoglobin: Unknown  Hemoglobin today: 7.8  Maintain 2 peripheral 18-gauge IVs,  Keep active type and screen, Monitor Hemoglobin/hematocrit     Suspected  Microscopic Hematuria   Consult Urology  for Possible cystoscope        Hx OA - Stable 77 y.o Female with PMHx of HTN, Aortic stenosis, atrial tachycardia, chronic venous stasis and OA BIBEMS for chief complaint  is  Lower Extremity  Swelling. Of note patient slid off her couch  and clarified that she did not fall.  Her primary complaint is the her lower   extremity  swelling,     Agree  with assessment  except for changes below.    VITAL SIGNS: Afebrile, vital signs stable  CONSTITUTIONAL: Well-developed; well-nourished; in no acute distress. Obese  SKIN: Skin exam is warm and dry, no acute rash.   HEAD: Normocephalic; atraumatic.  EYES: Pupils  reactive to light, Extraocular movements intact; conjunctiva and sclera clear.  ENT: No nasal discharge; airway clear. Moist mucus membranes.  NECK: Supple; non tender. No rigidity  CARD: regular rate and rhythm. Normal S1, S2; no murmurs, gallops, or rubs.  RESP: CT  auscultation bilaterally. No wheezes, rales or rhonchi.  ABD: Abdomen soft; non-tender; non-distended  EXT: Normal ROM. No clubbing, 2+edema.   NEURO: Alert and oriented x 3. No focal deficits.  PSYCH: cooperative, appropriate.      IMPRESSION  Chronic  Venous Stasis   Bilateral  LE  edema Non-Pitting   No Respiratory Distress   BNP 17K Unknown baseline   Re-wrap bilateral lower extremities in kerlix, ace Bandaging  Leg Elevation   Give 1 dose of Lasix      Hx AS Atrial  Tachycardia, HTN   - R/o ACS   - BNP 17k  No known Baseline  - No  active Chest pain    - Elevated  Trop  Stable on  repeat  in the setting of anemia   - ECG showing Atrial Tachycardia, ST Depression  V1- V5 Chronicity Unknown   - f/u echo   -Trend trop, Serial ECG   - Monitor on Tele   - Cardiology Eval     Asymptomatic bacteriuria   - U/A Grossly positive. No symptom   - f/u Ucx   - monitor off abx     Microcytic Anemia  UA Positive Hematuria   Baseline hemoglobin: Unknown  Hemoglobin today: 7.8  Maintain 2 peripheral 18-gauge IVs,  Keep active type and screen, Monitor Hemoglobin/hematocrit     Suspected  Microscopic Hematuria   Consult Urology  for Possible cystoscope        Hx OA - Stable    Seen on 04/20/23

## 2023-04-21 NOTE — CONSULT NOTE ADULT - SUBJECTIVE AND OBJECTIVE BOX
Patient is a 77y old  Female who presents with a chief complaint of Fall (2023 07:56)      HPI:  77 y.o Female with PMHx of HTN, Aortic stenosis, atrial tachycardia, chronic venous stasis and OA BIBEMS for chief complaint of fall. Patient states she is here because her doctor told her to come in for evaluation of her legs which she states have been swelling up. She did fall earlier today but she was sliding to the side of her couch and landed on her buttock. She states she has been experiencing difficulty walking because of her legs being swollen and she feels a burning pain in her left ankle. Her legs are seeping fluid, and has bilateral ulcers. Denies any pain, head injury or being on any blood thinners. Pt denies any urinary or bowel incontinence , back or neck pain, numbness, tingling or focal weakness.    Vital Signs Last 24 Hrs  T(C): 36.3 (2023 14:27), Max: 36.3 (2023 14:27)  T(F): 97.3 (2023 14:27), Max: 97.3 (2023 14:27)  HR: 94 (2023 14:27) (94 - 94)  BP: 122/70 (2023 14:27) (122/70 - 122/70)  BP(mean): --  RR: 14 (2023 14:27) (14 - 14)  SpO2: 98% (2023 14:27) (98% - 98%)    Parameters below as of 2023 14:27  Patient On (Oxygen Delivery Method): room air     (2023 00:03)      PAST MEDICAL & SURGICAL HISTORY:  H/O atrial tachycardia      Osteoarthritis      Hypertension      Aortic stenosis      No significant past surgical history          PREVIOUS DIAGNOSTIC TESTING:      ECHO  FINDINGS:    STRESS  FINDINGS:    CATHETERIZATION  FINDINGS:    MEDICATIONS  (STANDING):  aspirin  chewable 81 milliGRAM(s) Oral daily  atorvastatin 40 milliGRAM(s) Oral at bedtime  cefTRIAXone   IVPB 1000 milliGRAM(s) IV Intermittent every 24 hours  enoxaparin Injectable 40 milliGRAM(s) SubCutaneous every 24 hours  metoprolol tartrate 12.5 milliGRAM(s) Oral two times a day  pantoprazole    Tablet 40 milliGRAM(s) Oral two times a day    MEDICATIONS  (PRN):      FAMILY HISTORY:  No pertinent family history in first degree relatives        SOCIAL HISTORY:  CIGARETTES:    ALCOHOL:    REVIEW OF SYSTEMS:  CONSTITUTIONAL: No fever, weight loss, or fatigue  NECK: No pain or stiffness  RESPIRATORY: No cough, wheezing, chills or hemoptysis; No shortness of breath  CARDIOVASCULAR: No chest pain, palpitations, dizziness, or leg swelling  GASTROINTESTINAL: No abdominal or epigastric pain. No nausea, vomiting, or hematemesis; No diarrhea or constipation. No melena or hematochezia.  GENITOURINARY: No dysuria, frequency, hematuria, or incontinence  NEUROLOGICAL: No headaches, memory loss, loss of strength, numbness, or tremors  SKIN: No itching, burning, rashes, or lesions   ENDOCRINE: No heat or cold intolerance; No hair loss  MUSCULOSKELETAL: No joint pain or swelling; No muscle, back, or extremity pain  HEME/LYMPH: No easy bruising, or bleeding gums          Vital Signs Last 24 Hrs  T(C): 35.9 (2023 15:55), Max: 36.8 (2023 10:30)  T(F): 96.7 (2023 15:55), Max: 98.3 (2023 10:30)  HR: 84 (2023 15:55) (84 - 98)  BP: 92/46 (2023 15:55) (84/43 - 112/74)  BP(mean): --  RR: 18 (2023 15:55) (17 - 18)  SpO2: 91% (2023 15:55) (91% - 96%)    Parameters below as of 2023 10:30  Patient On (Oxygen Delivery Method): room air            PHYSICAL EXAM:  GENERAL: NAD, well-groomed, well-developed  HEAD:  Atraumatic, Normocephalic  NECK: Supple, No JVD, Normal thyroid  NERVOUS SYSTEM:  Alert & Oriented X3, Good concentration  CHEST/LUNG: Clear to percussion bilaterally; No rales, rhonchi, wheezing, or rubs  HEART: Regular rate and rhythm; No murmurs, rubs, or gallops  ABDOMEN: Soft, Nontender, Nondistended; Bowel sounds present  EXTREMITIES:  2+ Peripheral Pulses, No clubbing, cyanosis, or edema  SKIN: No rashes or lesions    INTERPRETATION OF TELEMETRY:    ECG:    I&O's Detail      LABS:                        7.4    7.63  )-----------( 481      ( 2023 06:12 )             25.4     04-21    140  |  107  |  15  ----------------------------<  96  3.5   |  24  |  0.8    Ca    8.3<L>      2023 06:12  Phos  3.9     -  Mg     1.8     -    TPro  5.3<L>  /  Alb  2.4<L>  /  TBili  0.5  /  DBili  x   /  AST  27  /  ALT  12  /  AlkPhos  78  04-    CARDIAC MARKERS ( 2023 17:05 )  x     / 0.22 ng/mL / x     / x     / x      CARDIAC MARKERS ( 2023 11:22 )  x     / 0.22 ng/mL / x     / x     / x      CARDIAC MARKERS ( 2023 06:12 )  x     / 0.15 ng/mL / x     / x     / x      CARDIAC MARKERS ( 2023 00:47 )  x     / 0.07 ng/mL / x     / x     / x      CARDIAC MARKERS ( 2023 17:15 )  x     / 0.06 ng/mL / x     / x     / x      CARDIAC MARKERS ( 2023 17:03 )  x     / x     / 332 U/L / x     / x            Urinalysis Basic - ( 2023 20:26 )    Color: Yellow / Appearance: Turbid / S.020 / pH: x  Gluc: x / Ketone: Small  / Bili: Negative / Urobili: 3 mg/dL   Blood: x / Protein: 30 mg/dL / Nitrite: Positive   Leuk Esterase: Large / RBC: >720 /HPF /  /HPF   Sq Epi: x / Non Sq Epi: x / Bacteria: Moderate      I&O's Summary      RADIOLOGY & ADDITIONAL STUDIES:

## 2023-04-21 NOTE — H&P ADULT - ASSESSMENT
77 y.o Female with PMHx of HTN, Aortic stenosis, atrial tachycardia, chronic venous stasis and OA BIBEMS for chief complaint of fall.    # Chronic venous stasis   - Bilateral LE edema  - Takes Furosemide and metolazone   - c/w Furosemide 20mg PO OD   - VAscular onboard >> No acute intervention   - Elevate Legs and ACE wrap them   - PT/OT    # Microcytic anemia   - Unknown baseline   - Iron studies and retic count in the AM     # Elevated Troponins   - Asymptomatic, no chest pain  - Trop 0.06 >> trend  - ECG: no ischemic changes     # AS   # Hx of Atrial tachycardias   # HTN  - No home medications for HTN  - Cardiologist Dr Mackenzie   - Get TTE   - BNP 17 000 (unknown baseline)      # OA  - stable    #DVT - lovenox sq   #GI - Not indicated   #Diet - DASH  #Activity - IAT   #Code - Full code     Disposition - IP  Med/Rec: confirmed with Pharmacy, patient does not know her medications ~ readdress with daughter       77 y.o Female with PMHx of HTN, Aortic stenosis, atrial tachycardia, chronic venous stasis and OA BIBEMS for chief complaint of fall.    # Chronic venous stasis   - Bilateral LE edema  - Takes Furosemide and metolazone   - c/w Furosemide 20mg PO OD   - VAscular onboard >> No acute intervention   - Elevate Legs and ACE wrap them   - PT/OT    # Microcytic anemia   - Unknown baseline   - Iron studies and retic count in the AM     # Asymptomatic bacteriuria   - U/A Grossly positive. No sympt   - f/u Ucx   - monitor off abx     # Elevated Troponins   - Asymptomatic, no chest pain  - Trop 0.06 >> trend  - ECG: no ischemic changes     # AS   # Hx of Atrial tachycardias   # HTN  - No home medications for HTN  - Cardiologist Dr Mackenzie   - Get TTE   - BNP 17 000 (unknown baseline)      # OA  - stable    #DVT - lovenox sq   #GI - Not indicated   #Diet - DASH  #Activity - IAT   #Code - Full code     Disposition - IP  Med/Rec: confirmed with Pharmacy, patient does not know her medications ~ readdress with daughter

## 2023-04-21 NOTE — PHYSICAL THERAPY INITIAL EVALUATION ADULT - RANGE OF MOTION EXAMINATION, REHAB EVAL
limited by soft tissue approximation/bilateral upper extremity ROM was WNL (within normal limits)/bilateral upper extremity ROM was WFL (within functional limits)

## 2023-04-21 NOTE — PROGRESS NOTE ADULT - SUBJECTIVE AND OBJECTIVE BOX
JESÚS TERRELL  77y  [facility]-N ED Hold 013 A      Patient is a 77y old  Female who presents with a chief complaint of Fall (21 Apr 2023 00:03)      INTERVAL HPI/OVERNIGHT EVENTS:        REVIEW OF SYSTEMS:        FAMILY HISTORY:  No pertinent family history in first degree relatives      T(C): 36.3 (04-20-23 @ 14:27), Max: 36.3 (04-20-23 @ 14:27)  HR: 94 (04-21-23 @ 00:58) (94 - 98)  BP: 112/74 (04-21-23 @ 00:58) (84/43 - 122/70)  RR: 17 (04-21-23 @ 00:58) (14 - 18)  SpO2: 96% (04-21-23 @ 00:58) (93% - 98%)  Wt(kg): --Vital Signs Last 24 Hrs  T(C): 36.3 (20 Apr 2023 14:27), Max: 36.3 (20 Apr 2023 14:27)  T(F): 97.3 (20 Apr 2023 14:27), Max: 97.3 (20 Apr 2023 14:27)  HR: 94 (21 Apr 2023 00:58) (94 - 98)  BP: 112/74 (21 Apr 2023 00:58) (84/43 - 122/70)  BP(mean): --  RR: 17 (21 Apr 2023 00:58) (14 - 18)  SpO2: 96% (21 Apr 2023 00:58) (93% - 98%)    Parameters below as of 20 Apr 2023 14:27  Patient On (Oxygen Delivery Method): room air        PHYSICAL EXAM:  GENERAL: NAD, well-groomed, well-developed  HEAD:  Atraumatic, Normocephalic  EYES: EOMI, PERRLA, conjunctiva and sclera clear  ENMT: No tonsillar erythema, exudates, or enlargement; Moist mucous membranes, Good dentition, No lesions  NECK: Supple, No JVD, Normal thyroid  NERVOUS SYSTEM:  Alert & Oriented X3, Good concentration; Motor Strength 5/5 B/L upper and lower extremities; DTRs 2+ intact and symmetric  PULM: Clear to auscultation bilaterally  CARDIAC: Regular rate and rhythm; No murmurs, rubs, or gallops  GI: Soft, Nontender, Nondistended; Bowel sounds present  EXTREMITIES:  2+ Peripheral Pulses, No clubbing, cyanosis, or edema  LYMPH: No lymphadenopathy noted  SKIN: No rashes or lesions    Consultant(s) Notes Reviewed:  [x ] YES  [ ] NO  Care Discussed with Consultants/Other Providers [ x] YES  [ ] NO    LABS:                            7.8    8.58  )-----------( 524      ( 20 Apr 2023 17:03 )             27.2   04-20    143  |  111<H>  |  12  ----------------------------<  88  3.7   |  22  |  0.6<L>    Ca    8.1<L>      20 Apr 2023 17:03              enoxaparin Injectable 40 milliGRAM(s) SubCutaneous every 24 hours  furosemide   Injectable 40 milliGRAM(s) IV Push once  furosemide   Oral Tab/Cap - Peds 20 milliGRAM(s) Oral daily      77 y.o Female with PMHx of HTN, Aortic stenosis, atrial tachycardia, chronic venous stasis and OA BIBEMS for chief complaint of fall.    1. Mechanicall fall associated with possible Acute UTI and Stasis dermatitis   - Admit to medicine/Telemetry       -ECG: no ischemic changes        - u/a:positive  - Start Rocephin   - Urine cx:pending Chronic venous stasis   - VAscular onboard >> No acute intervention   - Elevate Legs and ACE wrap them   - PT/OT      2. Possible mild acute diastolic CHF complicated by demand ischemia   - CXR: cardiomegaly and vascular redistribution. BNP 32381   - Start   - Cyclic cardiac enzymes detectable   - Echocardio:pending     2. Microcytic anemia   - Unknown baseline   - Iron studies and retic count in the AM       3. AS. Hx of Atrial tachycardias / HTN  - No home medications for HTN  - Cardiologist Dr Mackenzie     4.  OA  - stable    #DVT - lovenox sq   #GI - Not indicated   #Diet - DASH  #Activity - IAT   #Code - Full code      JESÚS TERRELL  77y  [facility]-N ED Hold 013 A      Patient is a 77y old  Female who presents with a chief complaint of Fall (21 Apr 2023 00:03)      INTERVAL HPI/OVERNIGHT EVENTS:        REVIEW OF SYSTEMS:        FAMILY HISTORY:  No pertinent family history in first degree relatives      T(C): 36.3 (04-20-23 @ 14:27), Max: 36.3 (04-20-23 @ 14:27)  HR: 94 (04-21-23 @ 00:58) (94 - 98)  BP: 112/74 (04-21-23 @ 00:58) (84/43 - 122/70)  RR: 17 (04-21-23 @ 00:58) (14 - 18)  SpO2: 96% (04-21-23 @ 00:58) (93% - 98%)  Wt(kg): --Vital Signs Last 24 Hrs  T(C): 36.3 (20 Apr 2023 14:27), Max: 36.3 (20 Apr 2023 14:27)  T(F): 97.3 (20 Apr 2023 14:27), Max: 97.3 (20 Apr 2023 14:27)  HR: 94 (21 Apr 2023 00:58) (94 - 98)  BP: 112/74 (21 Apr 2023 00:58) (84/43 - 122/70)  BP(mean): --  RR: 17 (21 Apr 2023 00:58) (14 - 18)  SpO2: 96% (21 Apr 2023 00:58) (93% - 98%)    Parameters below as of 20 Apr 2023 14:27  Patient On (Oxygen Delivery Method): room air        PHYSICAL EXAM:  GENERAL: NAD, well-groomed, well-developed  HEAD:  Atraumatic, Normocephalic  EYES: EOMI, PERRLA, conjunctiva and sclera clear  ENMT: No tonsillar erythema, exudates, or enlargement; Moist mucous membranes, Good dentition, No lesions  NECK: Supple, No JVD, Normal thyroid  NERVOUS SYSTEM:  Alert & Oriented X3, Good concentration; Motor Strength 5/5 B/L upper and lower extremities; DTRs 2+ intact and symmetric  PULM: Clear to auscultation bilaterally  CARDIAC: Regular rate and rhythm; No murmurs, rubs, or gallops  GI: Soft, Nontender, Nondistended; Bowel sounds present  EXTREMITIES:  2+ Peripheral Pulses, No clubbing, cyanosis, or edema  LYMPH: No lymphadenopathy noted  SKIN: No rashes or lesions    Consultant(s) Notes Reviewed:  [x ] YES  [ ] NO  Care Discussed with Consultants/Other Providers [ x] YES  [ ] NO    LABS:                            7.8    8.58  )-----------( 524      ( 20 Apr 2023 17:03 )             27.2   04-20    143  |  111<H>  |  12  ----------------------------<  88  3.7   |  22  |  0.6<L>    Ca    8.1<L>      20 Apr 2023 17:03              enoxaparin Injectable 40 milliGRAM(s) SubCutaneous every 24 hours  furosemide   Injectable 40 milliGRAM(s) IV Push once  furosemide   Oral Tab/Cap - Peds 20 milliGRAM(s) Oral daily      77 y.o Female with PMHx of HTN, Aortic stenosis, atrial tachycardia, chronic venous stasis and OA BIBEMS for chief complaint of fall.    1. Mechanicall fall associated with possible Acute UTI   - Admit to medicine/Telemetry       -ECG: no ischemic changes        - u/a:positive  - Start Rocephin   - Urine cx:pending  - Vascular onboard >> No acute intervention   - Elevate Legs and ACE wrap them   - PT/OT:pending      2. Possible mild acute diastolic CHF complicated by demand ischemia   - CXR: cardiomegaly and vascular redistribution. BNP 09274   - Start lasix 40mg IV bid   - Cyclic cardiac enzymes detectable trending upward   - Echocardio:pending     2. Microcytic anemia   - PPI bid          - Guaiac:pending   - Iron profile: pending      3. AS. Hx of Atrial tachycardias / HTN  - No home medications for HTN  - Cardiologist Dr Mackenzie     4.  OA  - stable    #DVT - lovenox sq   #GI - PPI bid   #Diet - DASH  #Activity - IAT   #Code - Full code      JESÚS TERRELL  77y  [facility]-N ED Hold 013 A      Patient is a 77y old  Female who presents with a chief complaint of Fall (21 Apr 2023 00:03)      INTERVAL HPI/OVERNIGHT EVENTS:  Patient feels well   She denies chest pain, sob,.   She verbalize understanding of her condition. no overnight events        FAMILY HISTORY:  No pertinent family history in first degree relatives      T(C): 36.3 (04-20-23 @ 14:27), Max: 36.3 (04-20-23 @ 14:27)  HR: 94 (04-21-23 @ 00:58) (94 - 98)  BP: 112/74 (04-21-23 @ 00:58) (84/43 - 122/70)  RR: 17 (04-21-23 @ 00:58) (14 - 18)  SpO2: 96% (04-21-23 @ 00:58) (93% - 98%)  Wt(kg): --Vital Signs Last 24 Hrs  T(C): 36.3 (20 Apr 2023 14:27), Max: 36.3 (20 Apr 2023 14:27)  T(F): 97.3 (20 Apr 2023 14:27), Max: 97.3 (20 Apr 2023 14:27)  HR: 94 (21 Apr 2023 00:58) (94 - 98)  BP: 112/74 (21 Apr 2023 00:58) (84/43 - 122/70)  BP(mean): --  RR: 17 (21 Apr 2023 00:58) (14 - 18)  SpO2: 96% (21 Apr 2023 00:58) (93% - 98%)    Parameters below as of 20 Apr 2023 14:27  Patient On (Oxygen Delivery Method): room air        PHYSICAL EXAM:  GENERAL: NAD, well-groomed, well-developed  NERVOUS SYSTEM:  Alert & Oriented X3,  PULM: Clear to auscultation bilaterally  CARDIAC: Regular rate and rhythm; murmur noted   GI: Soft, Nontender, Nondistended; Bowel sounds present  EXTREMITIES:  2+ Peripheral Pulses,    Consultant(s) Notes Reviewed:  [x ] YES  [ ] NO  Care Discussed with Consultants/Other Providers [ x] YES  [ ] NO    LABS:                            7.8    8.58  )-----------( 524      ( 20 Apr 2023 17:03 )             27.2   04-20    143  |  111<H>  |  12  ----------------------------<  88  3.7   |  22  |  0.6<L>    Ca    8.1<L>      20 Apr 2023 17:03              enoxaparin Injectable 40 milliGRAM(s) SubCutaneous every 24 hours  furosemide   Injectable 40 milliGRAM(s) IV Push once  furosemide   Oral Tab/Cap - Peds 20 milliGRAM(s) Oral daily      77 y.o Female with PMHx of HTN, Aortic stenosis, atrial tachycardia, chronic venous stasis and OA BIBEMS for chief complaint of fall.    1. Mechanicall fall associated with possible Acute UTI   - Admit to medicine/Telemetry       -ECG: no ischemic changes        - u/a:positive  - Start Rocephin   - Urine cx:pending  - Vascular onboard >> No acute intervention   - Elevate Legs and ACE wrap them   - PT/OT:pending      2. Possible mild acute diastolic CHF complicated by demand ischemia associated with critical aortic stenosis   - CXR: cardiomegaly and vascular redistribution. BNP 58142   - Start aspirin daily   - Start Atorvastatin 40mg HS   - Start low dose metoprolol   - Start lasix 40mg IV bid   - Cyclic cardiac enzymes detectable trending upward   - Echocardio:  a) Severe 'critical' aortic valve stenosis due to fibrocalcific AV. Vmax 5.49 m/s, MG 74 mmHg, and HARRIET 0.63cm^2.  - Cardio consult:pending     2. Microcytic anemia  due to iron def anemia   - PPI bid          - Guaiac:pending   - Iron profile: in favor of iron def   - Start venofer 200g IV daily 24 hours post-abs starting tmr    3. AS. Hx of Atrial tachycardias / HTN  - No home medications for HTN  - Cardiologist Dr Mackenzie     4.  OA  - stable    #DVT - lovenox sq   #GI - PPI bid   #Diet - DASH  #Activity - IAT   #Code - Full code      JESÚS TERRELL  77y  [facility]-N ED Hold 013 A      Patient is a 77y old  Female who presents with a chief complaint of Fall (21 Apr 2023 00:03)      INTERVAL HPI/OVERNIGHT EVENTS:  Patient feels well   She denies chest pain, sob,.   She verbalize understanding of her condition. no overnight events        FAMILY HISTORY:  No pertinent family history in first degree relatives      T(C): 36.3 (04-20-23 @ 14:27), Max: 36.3 (04-20-23 @ 14:27)  HR: 94 (04-21-23 @ 00:58) (94 - 98)  BP: 112/74 (04-21-23 @ 00:58) (84/43 - 122/70)  RR: 17 (04-21-23 @ 00:58) (14 - 18)  SpO2: 96% (04-21-23 @ 00:58) (93% - 98%)  Wt(kg): --Vital Signs Last 24 Hrs  T(C): 36.3 (20 Apr 2023 14:27), Max: 36.3 (20 Apr 2023 14:27)  T(F): 97.3 (20 Apr 2023 14:27), Max: 97.3 (20 Apr 2023 14:27)  HR: 94 (21 Apr 2023 00:58) (94 - 98)  BP: 112/74 (21 Apr 2023 00:58) (84/43 - 122/70)  BP(mean): --  RR: 17 (21 Apr 2023 00:58) (14 - 18)  SpO2: 96% (21 Apr 2023 00:58) (93% - 98%)    Parameters below as of 20 Apr 2023 14:27  Patient On (Oxygen Delivery Method): room air        PHYSICAL EXAM:  GENERAL: NAD, well-groomed, well-developed  NERVOUS SYSTEM:  Alert & Oriented X3,  PULM: Clear to auscultation bilaterally  CARDIAC: Regular rate and rhythm; murmur noted   GI: Soft, Nontender, Nondistended; Bowel sounds present  EXTREMITIES:  2+ Peripheral Pulses,    Consultant(s) Notes Reviewed:  [x ] YES  [ ] NO  Care Discussed with Consultants/Other Providers [ x] YES  [ ] NO    LABS:                            7.8    8.58  )-----------( 524      ( 20 Apr 2023 17:03 )             27.2   04-20    143  |  111<H>  |  12  ----------------------------<  88  3.7   |  22  |  0.6<L>    Ca    8.1<L>      20 Apr 2023 17:03              enoxaparin Injectable 40 milliGRAM(s) SubCutaneous every 24 hours  furosemide   Injectable 40 milliGRAM(s) IV Push once  furosemide   Oral Tab/Cap - Peds 20 milliGRAM(s) Oral daily      77 y.o Female with PMHx of HTN, Aortic stenosis, atrial tachycardia, chronic venous stasis and OA BIBEMS for chief complaint of fall.    1. Mechanicall fall associated with possible Acute UTI   - Admit to medicine/Telemetry       -ECG: no ischemic changes        - u/a:positive  - Start Rocephin   - Urine cx:pending  - Vascular onboard >> No acute intervention   - Elevate Legs and ACE wrap them   - PT/OT:pending      2. Possible mild acute diastolic CHF complicated by demand ischemia associated with critical aortic stenosis   - CXR: cardiomegaly and vascular redistribution. BNP 24628   - Start aspirin daily   - Start Atorvastatin 40mg HS   - Start low dose metoprolol   - Start lasix 40mg IV bid   - Cyclic cardiac enzymes detectable trending upward   - Echocardio:  a) Severe 'critical' aortic valve stenosis due to fibrocalcific AV. Vmax 5.49 m/s, MG 74 mmHg, and HARRIET 0.63cm^2.  - Cardio consult:pending     2. Microcytic anemia  due to iron def anemia   - PPI bid          - Guaiac:pending   - Iron profile: in favor of iron def   - Start venofer 200g IV daily 24 hours post-abs starting tmr    3. AS. Hx of Atrial tachycardias / HTN  - No home medications for HTN  - Cardiologist Dr Mackenzie     4.  OA  - stable    #DVT - lovenox sq   #GI - PPI bid   #Diet - DASH  #Activity - IAT   #Code - Full code     Updated the daughter about the plan of care. Med rec is getting reviewed      JESÚS TERRELL  77y  [facility]-N ED Hold 013 A      Patient is a 77y old  Female who presents with a chief complaint of Fall (21 Apr 2023 00:03)      INTERVAL HPI/OVERNIGHT EVENTS:  Patient feels well   She denies chest pain, sob,.   She verbalize understanding of her condition. no overnight events        FAMILY HISTORY:  No pertinent family history in first degree relatives      T(C): 36.3 (04-20-23 @ 14:27), Max: 36.3 (04-20-23 @ 14:27)  HR: 94 (04-21-23 @ 00:58) (94 - 98)  BP: 112/74 (04-21-23 @ 00:58) (84/43 - 122/70)  RR: 17 (04-21-23 @ 00:58) (14 - 18)  SpO2: 96% (04-21-23 @ 00:58) (93% - 98%)  Wt(kg): --Vital Signs Last 24 Hrs  T(C): 36.3 (20 Apr 2023 14:27), Max: 36.3 (20 Apr 2023 14:27)  T(F): 97.3 (20 Apr 2023 14:27), Max: 97.3 (20 Apr 2023 14:27)  HR: 94 (21 Apr 2023 00:58) (94 - 98)  BP: 112/74 (21 Apr 2023 00:58) (84/43 - 122/70)  BP(mean): --  RR: 17 (21 Apr 2023 00:58) (14 - 18)  SpO2: 96% (21 Apr 2023 00:58) (93% - 98%)    Parameters below as of 20 Apr 2023 14:27  Patient On (Oxygen Delivery Method): room air        PHYSICAL EXAM:  GENERAL: NAD, well-groomed, well-developed  NERVOUS SYSTEM:  Alert & Oriented X3,  PULM: Clear to auscultation bilaterally  CARDIAC: Regular rate and rhythm; murmur noted   GI: Soft, Nontender, Nondistended; Bowel sounds present  EXTREMITIES:  2+ Peripheral Pulses,    Consultant(s) Notes Reviewed:  [x ] YES  [ ] NO  Care Discussed with Consultants/Other Providers [ x] YES  [ ] NO    LABS:                            7.8    8.58  )-----------( 524      ( 20 Apr 2023 17:03 )             27.2   04-20    143  |  111<H>  |  12  ----------------------------<  88  3.7   |  22  |  0.6<L>    Ca    8.1<L>      20 Apr 2023 17:03              enoxaparin Injectable 40 milliGRAM(s) SubCutaneous every 24 hours  furosemide   Injectable 40 milliGRAM(s) IV Push once  furosemide   Oral Tab/Cap - Peds 20 milliGRAM(s) Oral daily      77 y.o Female with PMHx of HTN, Aortic stenosis, atrial tachycardia, chronic venous stasis and OA BIBEMS for chief complaint of fall.    1. Mechanicall fall associated with possible Acute UTI   - Admit to medicine/Telemetry       -ECG: no ischemic changes        - u/a:positive  - Start Rocephin   - Urine cx:pending  - Vascular consult appreciated   - Elevate Legs and ACE wrap them   - PT/OT:pending      2. Possible mild acute diastolic CHF complicated by demand ischemia associated with critical aortic stenosis   - CXR: cardiomegaly and vascular redistribution. BNP 63125   - Start aspirin daily   - Start Atorvastatin 40mg HS   - Start low dose metoprolol   - Start lasix 40mg IV daily  - Cyclic cardiac enzymes detectable trending upward   - Echocardio:  a) Severe 'critical' aortic valve stenosis due to fibrocalcific AV. Vmax 5.49 m/s, MG 74 mmHg, and HARRIET 0.63cm^2.  - Cardio consult:pending   - Cardio consult strutural heart disease:pending     2. Microcytic anemia  due to iron def anemia   - PPI bid          - Guaiac:pending   - Iron profile: in favor of iron def   - Start venofer 200g IV daily 24 hours post-abs starting tmr    3. AS. Hx of Atrial tachycardias / HTN  - No home medications for HTN     4.  OA  - stable    #DVT - lovenox sq   #GI - PPI bid   #Diet - DASH  #Activity - IAT   #Code - Full code     Updated the daughter about the plan of care. Med rec is getting reviewed      JESÚS TERRELL  77y  [facility]-N ED Hold 013 A      Patient is a 77y old  Female who presents with a chief complaint of Fall (21 Apr 2023 00:03)      INTERVAL HPI/OVERNIGHT EVENTS:  Patient feels well   She denies chest pain, sob,.   She verbalize understanding of her condition. no overnight events        FAMILY HISTORY:  No pertinent family history in first degree relatives      T(C): 36.3 (04-20-23 @ 14:27), Max: 36.3 (04-20-23 @ 14:27)  HR: 94 (04-21-23 @ 00:58) (94 - 98)  BP: 112/74 (04-21-23 @ 00:58) (84/43 - 122/70)  RR: 17 (04-21-23 @ 00:58) (14 - 18)  SpO2: 96% (04-21-23 @ 00:58) (93% - 98%)  Wt(kg): --Vital Signs Last 24 Hrs  T(C): 36.3 (20 Apr 2023 14:27), Max: 36.3 (20 Apr 2023 14:27)  T(F): 97.3 (20 Apr 2023 14:27), Max: 97.3 (20 Apr 2023 14:27)  HR: 94 (21 Apr 2023 00:58) (94 - 98)  BP: 112/74 (21 Apr 2023 00:58) (84/43 - 122/70)  BP(mean): --  RR: 17 (21 Apr 2023 00:58) (14 - 18)  SpO2: 96% (21 Apr 2023 00:58) (93% - 98%)    Parameters below as of 20 Apr 2023 14:27  Patient On (Oxygen Delivery Method): room air        PHYSICAL EXAM:  GENERAL: NAD, well-groomed, well-developed  NERVOUS SYSTEM:  Alert & Oriented X3,  PULM: Clear to auscultation bilaterally  CARDIAC: Regular rate and rhythm; murmur noted   GI: Soft, Nontender, Nondistended; Bowel sounds present  EXTREMITIES:  2+ Peripheral Pulses,    Consultant(s) Notes Reviewed:  [x ] YES  [ ] NO  Care Discussed with Consultants/Other Providers [ x] YES  [ ] NO    LABS:                            7.8    8.58  )-----------( 524      ( 20 Apr 2023 17:03 )             27.2   04-20    143  |  111<H>  |  12  ----------------------------<  88  3.7   |  22  |  0.6<L>    Ca    8.1<L>      20 Apr 2023 17:03              enoxaparin Injectable 40 milliGRAM(s) SubCutaneous every 24 hours  furosemide   Injectable 40 milliGRAM(s) IV Push once  furosemide   Oral Tab/Cap - Peds 20 milliGRAM(s) Oral daily      77 y.o Female with PMHx of HTN, Aortic stenosis, atrial tachycardia, chronic venous stasis and OA BIBEMS for chief complaint of fall.    1. Mechanicall fall associated with possible Acute UTI   - Admit to medicine/Telemetry       -ECG: no ischemic changes        - u/a:positive  - Start Rocephin   - Urine cx:pending  - Vascular consult appreciated   - Elevate Legs and ACE wrap them   - PT/OT:pending      2. Possible mild acute diastolic CHF complicated by demand ischemia associated with critical aortic stenosis   - CXR: cardiomegaly and vascular redistribution. BNP 30798   - Start aspirin daily   - Start Atorvastatin 40mg HS   - Start low dose metoprolol   - Start lasix 40mg IV daily  - Cyclic cardiac enzymes detectable trending upward   - Echocardio:  a) Severe 'critical' aortic valve stenosis due to fibrocalcific AV. Vmax 5.49 m/s, MG 74 mmHg, and HARRIET 0.63cm^2.  - Cardio consult:pending (ordered urgently. Spoke to . he is aware about the patient, critical aortic stenosis and positive trop)    - Cardio consult strutural heart disease:pending     2. Microcytic anemia  due to iron def anemia   - PPI bid          - Guaiac:pending   - Iron profile: in favor of iron def   - Start venofer 200g IV daily 24 hours post-abs starting tmr    3. AS. Hx of Atrial tachycardias / HTN  - No home medications for HTN     4.  OA  - stable    #DVT - lovenox sq   #GI - PPI bid   #Diet - DASH  #Activity - IAT   #Code - Full code     Updated the daughter about the plan of care. Med rec is getting reviewed      JESÚS TERRELL  77y  [facility]-N ED Hold 013 A      Patient is a 77y old  Female who presents with a chief complaint of Fall (21 Apr 2023 00:03)      INTERVAL HPI/OVERNIGHT EVENTS:  Patient feels well   She denies chest pain, sob,.   She verbalize understanding of her condition. no overnight events        FAMILY HISTORY:  No pertinent family history in first degree relatives      T(C): 36.3 (04-20-23 @ 14:27), Max: 36.3 (04-20-23 @ 14:27)  HR: 94 (04-21-23 @ 00:58) (94 - 98)  BP: 112/74 (04-21-23 @ 00:58) (84/43 - 122/70)  RR: 17 (04-21-23 @ 00:58) (14 - 18)  SpO2: 96% (04-21-23 @ 00:58) (93% - 98%)  Wt(kg): --Vital Signs Last 24 Hrs  T(C): 36.3 (20 Apr 2023 14:27), Max: 36.3 (20 Apr 2023 14:27)  T(F): 97.3 (20 Apr 2023 14:27), Max: 97.3 (20 Apr 2023 14:27)  HR: 94 (21 Apr 2023 00:58) (94 - 98)  BP: 112/74 (21 Apr 2023 00:58) (84/43 - 122/70)  BP(mean): --  RR: 17 (21 Apr 2023 00:58) (14 - 18)  SpO2: 96% (21 Apr 2023 00:58) (93% - 98%)    Parameters below as of 20 Apr 2023 14:27  Patient On (Oxygen Delivery Method): room air        PHYSICAL EXAM:  GENERAL: NAD, well-groomed, well-developed  NERVOUS SYSTEM:  Alert & Oriented X3,  PULM: Clear to auscultation bilaterally  CARDIAC: Regular rate and rhythm; murmur noted   GI: Soft, Nontender, Nondistended; Bowel sounds present  EXTREMITIES:  2+ Peripheral Pulses,    Consultant(s) Notes Reviewed:  [x ] YES  [ ] NO  Care Discussed with Consultants/Other Providers [ x] YES  [ ] NO    LABS:                            7.8    8.58  )-----------( 524      ( 20 Apr 2023 17:03 )             27.2   04-20    143  |  111<H>  |  12  ----------------------------<  88  3.7   |  22  |  0.6<L>    Ca    8.1<L>      20 Apr 2023 17:03              enoxaparin Injectable 40 milliGRAM(s) SubCutaneous every 24 hours  furosemide   Injectable 40 milliGRAM(s) IV Push once  furosemide   Oral Tab/Cap - Peds 20 milliGRAM(s) Oral daily      77 y.o Female with PMHx of HTN, Aortic stenosis, atrial tachycardia, chronic venous stasis and OA BIBEMS for chief complaint of fall.    1. Mechanicall fall associated with possible Acute UTI   - Admit to medicine/Telemetry       -ECG: no ischemic changes        - u/a:positive  - Start Rocephin   - Urine cx:pending  - Vascular consult appreciated   - Elevate Legs and ACE wrap them   - PT/OT:pending      2. Possible mild acute diastolic CHF complicated by demand ischemia associated with critical aortic stenosis   - Telemetry            - ECG: TWI lateral leads             - CXR: cardiomegaly and vascular redistribution. BNP 94582   - Start aspirin daily   - Start Atorvastatin 40mg HS   - Start low dose metoprolol   - Start lasix 40mg IV daily  - I will hold on therapeutic heparin awaiting cardio due to significant iron def anemia   - Cyclic cardiac enzymes detectable trending upward   - I would hold on heparin IV until   - Echocardio:  a) Severe 'critical' aortic valve stenosis due to fibrocalcific AV. Vmax 5.49 m/s, MG 74 mmHg, and HARRIET 0.63cm^2.  - Cardio consult:pending (ordered urgently. Spoke to . he is aware about the patient, critical aortic stenosis and positive trop)    - Cardio consult strutural heart disease:pending     2. Microcytic anemia  due to iron def anemia   - PPI bid          - Guaiac:pending   - Iron profile: in favor of iron def   - Start venofer 200g IV daily 24 hours post-abs starting tmr    3. AS. Hx of Atrial tachycardias / HTN  - No home medications for HTN     4.  OA  - stable    #DVT - lovenox sq   #GI - PPI bid   #Diet - DASH  #Activity - IAT   #Code - Full code     Updated the daughter about the plan of care. Med rec is getting reviewed

## 2023-04-21 NOTE — PHYSICAL THERAPY INITIAL EVALUATION ADULT - GENERAL OBSERVATIONS, REHAB EVAL
[phone] Patient encountered semi palma in bed + IV, + prima fit , + tele, NAD receptive to Pt , B lower legs wraps  present

## 2023-04-21 NOTE — H&P ADULT - NSHPLABSRESULTS_GEN_ALL_CORE
7.8    8.58  )-----------( 524      ( 20 Apr 2023 17:03 )             27.2     04-20    143  |  111<H>  |  12  ----------------------------<  88  3.7   |  22  |  0.6<L>    Ca    8.1<L>      20 Apr 2023 17:03

## 2023-04-22 LAB
ALBUMIN SERPL ELPH-MCNC: 2.3 G/DL — LOW (ref 3.5–5.2)
ALP SERPL-CCNC: 74 U/L — SIGNIFICANT CHANGE UP (ref 30–115)
ALT FLD-CCNC: 13 U/L — SIGNIFICANT CHANGE UP (ref 0–41)
ANION GAP SERPL CALC-SCNC: 9 MMOL/L — SIGNIFICANT CHANGE UP (ref 7–14)
AST SERPL-CCNC: 29 U/L — SIGNIFICANT CHANGE UP (ref 0–41)
BASOPHILS # BLD AUTO: 0.04 K/UL — SIGNIFICANT CHANGE UP (ref 0–0.2)
BASOPHILS NFR BLD AUTO: 0.5 % — SIGNIFICANT CHANGE UP (ref 0–1)
BILIRUB SERPL-MCNC: 0.3 MG/DL — SIGNIFICANT CHANGE UP (ref 0.2–1.2)
BLD GP AB SCN SERPL QL: SIGNIFICANT CHANGE UP
BUN SERPL-MCNC: 16 MG/DL — SIGNIFICANT CHANGE UP (ref 10–20)
CALCIUM SERPL-MCNC: 7.7 MG/DL — LOW (ref 8.4–10.5)
CHLORIDE SERPL-SCNC: 108 MMOL/L — SIGNIFICANT CHANGE UP (ref 98–110)
CHOLEST SERPL-MCNC: 87 MG/DL — SIGNIFICANT CHANGE UP
CO2 SERPL-SCNC: 26 MMOL/L — SIGNIFICANT CHANGE UP (ref 17–32)
CREAT SERPL-MCNC: 0.8 MG/DL — SIGNIFICANT CHANGE UP (ref 0.7–1.5)
EGFR: 75 ML/MIN/1.73M2 — SIGNIFICANT CHANGE UP
EOSINOPHIL # BLD AUTO: 0.22 K/UL — SIGNIFICANT CHANGE UP (ref 0–0.7)
EOSINOPHIL NFR BLD AUTO: 3 % — SIGNIFICANT CHANGE UP (ref 0–8)
FERRITIN SERPL-MCNC: 24 NG/ML — SIGNIFICANT CHANGE UP (ref 15–150)
GAS PNL BLDA: SIGNIFICANT CHANGE UP
GLUCOSE BLDC GLUCOMTR-MCNC: 136 MG/DL — HIGH (ref 70–99)
GLUCOSE SERPL-MCNC: 117 MG/DL — HIGH (ref 70–99)
HCT VFR BLD CALC: 24.2 % — LOW (ref 37–47)
HCT VFR BLD CALC: 27.2 % — LOW (ref 37–47)
HDLC SERPL-MCNC: 25 MG/DL — LOW
HGB BLD-MCNC: 7 G/DL — LOW (ref 12–16)
HGB BLD-MCNC: 8.1 G/DL — LOW (ref 12–16)
IMM GRANULOCYTES NFR BLD AUTO: 0.4 % — HIGH (ref 0.1–0.3)
LACTATE SERPL-SCNC: 1.1 MMOL/L — SIGNIFICANT CHANGE UP (ref 0.7–2)
LIPID PNL WITH DIRECT LDL SERPL: 151 MG/DL — HIGH
LYMPHOCYTES # BLD AUTO: 1.56 K/UL — SIGNIFICANT CHANGE UP (ref 1.2–3.4)
LYMPHOCYTES # BLD AUTO: 21.4 % — SIGNIFICANT CHANGE UP (ref 20.5–51.1)
MAGNESIUM SERPL-MCNC: 1.7 MG/DL — LOW (ref 1.8–2.4)
MCHC RBC-ENTMCNC: 22.3 PG — LOW (ref 27–31)
MCHC RBC-ENTMCNC: 23.5 PG — LOW (ref 27–31)
MCHC RBC-ENTMCNC: 28.9 G/DL — LOW (ref 32–37)
MCHC RBC-ENTMCNC: 29.8 G/DL — LOW (ref 32–37)
MCV RBC AUTO: 77.1 FL — LOW (ref 81–99)
MCV RBC AUTO: 78.8 FL — LOW (ref 81–99)
MONOCYTES # BLD AUTO: 0.84 K/UL — HIGH (ref 0.1–0.6)
MONOCYTES NFR BLD AUTO: 11.5 % — HIGH (ref 1.7–9.3)
NEUTROPHILS # BLD AUTO: 4.6 K/UL — SIGNIFICANT CHANGE UP (ref 1.4–6.5)
NEUTROPHILS NFR BLD AUTO: 63.2 % — SIGNIFICANT CHANGE UP (ref 42.2–75.2)
NON HDL CHOLESTEROL: 62 MG/DL — SIGNIFICANT CHANGE UP
NRBC # BLD: 0 /100 WBCS — SIGNIFICANT CHANGE UP (ref 0–0)
NRBC # BLD: 0 /100 WBCS — SIGNIFICANT CHANGE UP (ref 0–0)
PLATELET # BLD AUTO: 401 K/UL — HIGH (ref 130–400)
PLATELET # BLD AUTO: 433 K/UL — HIGH (ref 130–400)
PMV BLD: 9.3 FL — SIGNIFICANT CHANGE UP (ref 7.4–10.4)
PMV BLD: 9.7 FL — SIGNIFICANT CHANGE UP (ref 7.4–10.4)
POTASSIUM SERPL-MCNC: 3.5 MMOL/L — SIGNIFICANT CHANGE UP (ref 3.5–5)
POTASSIUM SERPL-SCNC: 3.5 MMOL/L — SIGNIFICANT CHANGE UP (ref 3.5–5)
PROT SERPL-MCNC: 5.1 G/DL — LOW (ref 6–8)
RBC # BLD: 3.14 M/UL — LOW (ref 4.2–5.4)
RBC # BLD: 3.45 M/UL — LOW (ref 4.2–5.4)
RBC # FLD: 19.2 % — HIGH (ref 11.5–14.5)
RBC # FLD: 19.5 % — HIGH (ref 11.5–14.5)
SODIUM SERPL-SCNC: 143 MMOL/L — SIGNIFICANT CHANGE UP (ref 135–146)
TRIGL SERPL-MCNC: 74 MG/DL — SIGNIFICANT CHANGE UP
WBC # BLD: 6.65 K/UL — SIGNIFICANT CHANGE UP (ref 4.8–10.8)
WBC # BLD: 7.29 K/UL — SIGNIFICANT CHANGE UP (ref 4.8–10.8)
WBC # FLD AUTO: 6.65 K/UL — SIGNIFICANT CHANGE UP (ref 4.8–10.8)
WBC # FLD AUTO: 7.29 K/UL — SIGNIFICANT CHANGE UP (ref 4.8–10.8)

## 2023-04-22 PROCEDURE — 71045 X-RAY EXAM CHEST 1 VIEW: CPT | Mod: 26

## 2023-04-22 PROCEDURE — 99291 CRITICAL CARE FIRST HOUR: CPT

## 2023-04-22 PROCEDURE — 99223 1ST HOSP IP/OBS HIGH 75: CPT

## 2023-04-22 PROCEDURE — 93010 ELECTROCARDIOGRAM REPORT: CPT

## 2023-04-22 RX ORDER — CHLORHEXIDINE GLUCONATE 213 G/1000ML
1 SOLUTION TOPICAL EVERY 12 HOURS
Refills: 0 | Status: COMPLETED | OUTPATIENT
Start: 2023-04-22 | End: 2023-04-22

## 2023-04-22 RX ORDER — POTASSIUM CHLORIDE 20 MEQ
40 PACKET (EA) ORAL ONCE
Refills: 0 | Status: COMPLETED | OUTPATIENT
Start: 2023-04-22 | End: 2023-04-22

## 2023-04-22 RX ORDER — FUROSEMIDE 40 MG
40 TABLET ORAL ONCE
Refills: 0 | Status: COMPLETED | OUTPATIENT
Start: 2023-04-22 | End: 2023-04-22

## 2023-04-22 RX ORDER — MAGNESIUM SULFATE 500 MG/ML
2 VIAL (ML) INJECTION ONCE
Refills: 0 | Status: COMPLETED | OUTPATIENT
Start: 2023-04-22 | End: 2023-04-22

## 2023-04-22 RX ADMIN — Medication 40 MILLIEQUIVALENT(S): at 10:13

## 2023-04-22 RX ADMIN — PANTOPRAZOLE SODIUM 40 MILLIGRAM(S): 20 TABLET, DELAYED RELEASE ORAL at 05:47

## 2023-04-22 RX ADMIN — Medication 40 MILLIGRAM(S): at 05:47

## 2023-04-22 RX ADMIN — PANTOPRAZOLE SODIUM 40 MILLIGRAM(S): 20 TABLET, DELAYED RELEASE ORAL at 17:24

## 2023-04-22 RX ADMIN — Medication 25 GRAM(S): at 06:49

## 2023-04-22 RX ADMIN — CHLORHEXIDINE GLUCONATE 1 APPLICATION(S): 213 SOLUTION TOPICAL at 19:05

## 2023-04-22 RX ADMIN — ENOXAPARIN SODIUM 40 MILLIGRAM(S): 100 INJECTION SUBCUTANEOUS at 23:29

## 2023-04-22 RX ADMIN — ENOXAPARIN SODIUM 40 MILLIGRAM(S): 100 INJECTION SUBCUTANEOUS at 00:32

## 2023-04-22 RX ADMIN — IRON SUCROSE 110 MILLIGRAM(S): 20 INJECTION, SOLUTION INTRAVENOUS at 23:31

## 2023-04-22 RX ADMIN — CEFTRIAXONE 100 MILLIGRAM(S): 500 INJECTION, POWDER, FOR SOLUTION INTRAMUSCULAR; INTRAVENOUS at 15:14

## 2023-04-22 RX ADMIN — IRON SUCROSE 110 MILLIGRAM(S): 20 INJECTION, SOLUTION INTRAVENOUS at 00:32

## 2023-04-22 RX ADMIN — Medication 12.5 MILLIGRAM(S): at 17:24

## 2023-04-22 RX ADMIN — Medication 81 MILLIGRAM(S): at 13:03

## 2023-04-22 RX ADMIN — ATORVASTATIN CALCIUM 40 MILLIGRAM(S): 80 TABLET, FILM COATED ORAL at 21:07

## 2023-04-22 RX ADMIN — CHLORHEXIDINE GLUCONATE 1 APPLICATION(S): 213 SOLUTION TOPICAL at 05:49

## 2023-04-22 RX ADMIN — Medication 40 MILLIGRAM(S): at 13:03

## 2023-04-22 NOTE — CONSULT NOTE ADULT - SUBJECTIVE AND OBJECTIVE BOX
Gastroenterology Consultation:    Patient is a 77y old  Female who presents with a chief complaint of Fall (21 Apr 2023 19:27)      Admitted on: 04-20-23  HPI:  77 y.o Female with PMHx of HTN, Aortic stenosis, atrial tachycardia, chronic venous stasis and OA BIBEMS for chief complaint of fall. Patient states she is here because her doctor told her to come in for evaluation of her legs which she states have been swelling up admitted for CHF exacerbation/critical AS. GI was consulted for anemia. Patient denies any abdominal pain, nausea, vomiting, hematemesis, melena, hematochezia, weight loss.      Prior EGD: long time ago as per patient none on chart   Prior Colonoscopy: long time ago as per patient none on chart       PAST MEDICAL & SURGICAL HISTORY:  H/O atrial tachycardia      Osteoarthritis      Hypertension      Aortic stenosis      No significant past surgical history          FAMILY HISTORY:  No pertinent family history in first degree relatives        Social History:  Tobacco: N  Alcohol: N  Drugs: N    Home Medications:  furosemide 20 mg oral tablet: 1 orally once a day (21 Apr 2023 00:52)  metOLazone 2.5 mg oral tablet: 1 orally 2 times a week (21 Apr 2023 00:52)    MEDICATIONS  (STANDING):  aspirin  chewable 81 milliGRAM(s) Oral daily  atorvastatin 40 milliGRAM(s) Oral at bedtime  cefTRIAXone   IVPB 1000 milliGRAM(s) IV Intermittent every 24 hours  chlorhexidine 2% Cloths 1 Application(s) Topical every 12 hours  enoxaparin Injectable 40 milliGRAM(s) SubCutaneous every 24 hours  furosemide   Injectable 40 milliGRAM(s) IV Push daily  furosemide   Injectable 40 milliGRAM(s) IV Push once  iron sucrose IVPB 200 milliGRAM(s) IV Intermittent every 24 hours  metoprolol tartrate 12.5 milliGRAM(s) Oral two times a day  pantoprazole    Tablet 40 milliGRAM(s) Oral two times a day  potassium chloride   Powder 40 milliEquivalent(s) Oral once    MEDICATIONS  (PRN):      Allergies  Allergy Status Unknown      Review of Systems:   Constitutional:  No Fever, No Chills  ENT/Mouth:  No Hearing Changes,  No Difficulty Swallowing  Eyes:  No Eye Pain, No Vision Changes  Cardiovascular:  No Chest Pain, No Palpitations  Respiratory:  No Cough, No Dyspnea  Gastrointestinal:  As described in HPI  Musculoskeletal:  No Joint Swelling, No Back Pain  Skin:  No Skin Lesions, No Jaundice  Neuro:  No Syncope, No Dizziness  Heme/Lymph:  No Bruising, No Bleeding.          Physical Examination:  T(C): 36.8 (04-22-23 @ 08:00), Max: 37.3 (04-22-23 @ 00:41)  HR: 72 (04-22-23 @ 08:00) (72 - 96)  BP: 92/53 (04-22-23 @ 08:00) (83/46 - 122/59)  RR: 23 (04-22-23 @ 08:00) (18 - 25)  SpO2: 98% (04-22-23 @ 08:00) (91% - 100%)  Height (cm): 167.6 (04-21-23 @ 19:27)  Weight (kg): 90.7 (04-21-23 @ 19:27)    04-21-23 @ 07:01  -  04-22-23 @ 07:00  --------------------------------------------------------  IN: 460 mL / OUT: 1500 mL / NET: -1040 mL        Constitutional: No acute distress.  Eyes:. Conjunctivae are clear, Sclera is non-icteric.  Ears Nose and Throat: The external ears are normal appearing,  Oral mucosa is pink and moist.  Respiratory:  No signs of respiratory distress. Lung sounds are clear bilaterally.  Cardiovascular:  S1 S2, Regular rate and rhythm.  GI: Abdomen is soft, symmetric, and non-tender without distention. There are no visible lesions or scars. Bowel sounds are present and normoactive in all four quadrants. No masses, hepatomegaly, or splenomegaly are noted.   Neuro: No Tremor, No involuntary movements  Skin: No rashes, No Jaundice.          Data:                        7.0    7.29  )-----------( 433      ( 22 Apr 2023 04:47 )             24.2     Hgb Trend:  7.0  04-22-23 @ 04:47  7.4  04-21-23 @ 06:12  7.8  04-20-23 @ 17:03        04-22    143  |  108  |  16  ----------------------------<  117<H>  3.5   |  26  |  0.8    Ca    7.7<L>      22 Apr 2023 04:47  Phos  3.9     04-21  Mg     1.7     04-22    TPro  5.1<L>  /  Alb  2.3<L>  /  TBili  0.3  /  DBili  x   /  AST  29  /  ALT  13  /  AlkPhos  74  04-22    Liver panel trend:  TBili 0.3   /   AST 29   /   ALT 13   /   AlkP 74   /   Tptn 5.1   /   Alb 2.3    /   DBili --      04-22  TBili 0.5   /   AST 27   /   ALT 12   /   AlkP 78   /   Tptn 5.3   /   Alb 2.4    /   DBili --      04-21              Radiology:

## 2023-04-22 NOTE — PROGRESS NOTE ADULT - ASSESSMENT
77 y.o Female with PMHx of HTN, Severe Aortic stenosis, atrial tachycardia, chronic venous stasis and OA BIBEMS for chief complaint of fall.    # Chronic venous stasis   - Bilateral LE edema  - Takes Furosemide and metolazone   - c/w Furosemide 20mg PO OD   - VAscular onboard >> No acute intervention   - Elevate Legs and ACE wrap them   - PT/OT    # Microcytic anemia   - Unknown baseline   - Iron studies and retic count in the AM   - Transfuse PRBC, f/u CBC  - GI evaluation    # Asymptomatic bacteriuria   - U/A Grossly positive. No sympt   - f/u Ucx   - monitor off abx     # Elevated Troponins   - Asymptomatic, no chest pain  - Will trend  - ECG: no ischemic changes     # Severe AS   # Hx of Atrial tachycardias   # HTN  - No home medications for HTN  - Cardiologist Dr Mackenzie   - Get TTE   - BNP 17 000 (unknown baseline)   - CT surgery evaluation for TAVR vs. SAVR     # OA  - stable    #DVT - lovenox sq   #GI - Not indicated   #Diet - DASH  #Activity - IAT   #Code - Full code     Disposition - CCU

## 2023-04-22 NOTE — CONSULT NOTE ADULT - NS ATTEND AMEND GEN_ALL_CORE FT
The patient is a 77-year-old lady we were asked to evaluate following her admission for decompensated acute on chronic systolic congestive heart failure along with some chest discomfort.    The patient tells me that she is known to have a cardiac murmur at least for the last 30 years and was told about aortic stenosis more than 10 years ago.    She has been on regular follow-up with her cardiologist and is known to have severe aortic stenosis but has been refusing intervention for the same in the past on multiple occasions when she was offered it.    She says that she has chronic lymphedema in her legs and venous stasis disease with extensive edema in her legs extending up to the groins and follows up with the vascular surgeons for the same but has been lost to follow-up intermittently.    She has a history of hypertension, hypercholesterolemia and severe arthritis as well as some vague cardiac arrhythmias which she could not give me much detail about.    She did have a history of a breast lumpectomy in the past but tells me that she thinks it was benign.    As part of the history she told me that she has had a significant weight loss and this should be investigated further as she also has a significant anemia.    I had a chance to review her echocardiogram which clearly shows severe aortic stenosis and she will require an intervention on this valve.    I briefly discussed the options for intervention including surgery and the patient is absolutely 100% convinced that she will not have surgery but would be interested in pursuing a possible transcatheter valve option.    She will need a full preoperative work-up including a cardiac catheterization and a dedicated CT scan to evaluate access.    The patient tells me that she has a grown daughter Ruba, who lives in [place] and will be her healthcare proxy.    Her care should be discussed in a multidisciplinary approach heart team approach and we will come up with the best plan for this patient.    I did spend a significant amount of time with the patient as she had multiple questions and these were all answered.

## 2023-04-22 NOTE — PROGRESS NOTE ADULT - SUBJECTIVE AND OBJECTIVE BOX
Patient is a 77y old  Female who presents with a chief complaint of Fall (22 Apr 2023 09:26)    HPI:  77 y.o Female with PMHx of HTN, Aortic stenosis, atrial tachycardia, chronic venous stasis and OA BIBEMS for chief complaint of fall. Patient states she is here because her doctor told her to come in for evaluation of her legs which she states have been swelling up. She did fall earlier today but she was sliding to the side of her couch and landed on her buttock. She states she has been experiencing difficulty walking because of her legs being swollen and she feels a burning pain in her left ankle. Her legs are seeping fluid, and has bilateral ulcers. Denies any pain, head injury or being on any blood thinners. Pt denies any urinary or bowel incontinence , back or neck pain, numbness, tingling or focal weakness.    Vital Signs Last 24 Hrs  T(C): 36.3 (20 Apr 2023 14:27), Max: 36.3 (20 Apr 2023 14:27)  T(F): 97.3 (20 Apr 2023 14:27), Max: 97.3 (20 Apr 2023 14:27)  HR: 94 (20 Apr 2023 14:27) (94 - 94)  BP: 122/70 (20 Apr 2023 14:27) (122/70 - 122/70)  BP(mean): --  RR: 14 (20 Apr 2023 14:27) (14 - 14)  SpO2: 98% (20 Apr 2023 14:27) (98% - 98%)    Parameters below as of 20 Apr 2023 14:27  Patient On (Oxygen Delivery Method): room air     (21 Apr 2023 00:03)      SUBJ:  Patient seen and examined. Upgraded to CCU last night due to decompensation. Clinically improving.      MEDICATIONS  (STANDING):  aspirin  chewable 81 milliGRAM(s) Oral daily  atorvastatin 40 milliGRAM(s) Oral at bedtime  cefTRIAXone   IVPB 1000 milliGRAM(s) IV Intermittent every 24 hours  chlorhexidine 2% Cloths 1 Application(s) Topical every 12 hours  enoxaparin Injectable 40 milliGRAM(s) SubCutaneous every 24 hours  furosemide   Injectable 40 milliGRAM(s) IV Push daily  iron sucrose IVPB 200 milliGRAM(s) IV Intermittent every 24 hours  metoprolol tartrate 12.5 milliGRAM(s) Oral two times a day  pantoprazole    Tablet 40 milliGRAM(s) Oral two times a day    MEDICATIONS  (PRN):            Vital Signs Last 24 Hrs  T(C): 36.7 (22 Apr 2023 12:00), Max: 37.3 (22 Apr 2023 00:41)  T(F): 98 (22 Apr 2023 12:00), Max: 99.1 (22 Apr 2023 00:41)  HR: 67 (22 Apr 2023 13:00) (66 - 85)  BP: 81/49 (22 Apr 2023 13:00) (73/42 - 122/59)  BP(mean): 61 (22 Apr 2023 13:00) (53 - 85)  RR: 20 (22 Apr 2023 13:00) (17 - 30)  SpO2: 97% (22 Apr 2023 13:00) (91% - 100%)    Parameters below as of 22 Apr 2023 13:00  Patient On (Oxygen Delivery Method): nasal cannula  O2 Flow (L/min): 2        PHYSICAL EXAM:    GEN: AAO x 3, NAD  HEENT: NC/AT, PERRL  Neck: No JVD, no bruits  CV: Reg, S1-S2, 3/6 murmur  Lungs: CTAB  Abd: Soft, non-tender  Ext: + edema        04-21-23 @ 07:01  -  04-22-23 @ 07:00  --------------------------------------------------------  IN: 460 mL / OUT: 1500 mL / NET: -1040 mL        I&O's Summary    21 Apr 2023 07:01  -  22 Apr 2023 07:00  --------------------------------------------------------  IN: 460 mL / OUT: 1500 mL / NET: -1040 mL    	    TELEMETRY:    ECG:    TTE:      LABS:                        7.0    7.29  )-----------( 433      ( 22 Apr 2023 04:47 )             24.2     04-22    143  |  108  |  16  ----------------------------<  117<H>  3.5   |  26  |  0.8    Ca    7.7<L>      22 Apr 2023 04:47  Phos  3.9     04-21  Mg     1.7     04-22    TPro  5.1<L>  /  Alb  2.3<L>  /  TBili  0.3  /  DBili  x   /  AST  29  /  ALT  13  /  AlkPhos  74  04-22    CARDIAC MARKERS ( 21 Apr 2023 17:05 )  x     / 0.22 ng/mL / x     / x     / x      CARDIAC MARKERS ( 21 Apr 2023 11:22 )  x     / 0.22 ng/mL / x     / x     / x      CARDIAC MARKERS ( 21 Apr 2023 06:12 )  x     / 0.15 ng/mL / x     / x     / x      CARDIAC MARKERS ( 21 Apr 2023 00:47 )  x     / 0.07 ng/mL / x     / x     / x      CARDIAC MARKERS ( 20 Apr 2023 17:15 )  x     / 0.06 ng/mL / x     / x     / x      CARDIAC MARKERS ( 20 Apr 2023 17:03 )  x     / x     / 332 U/L / x     / x                BNP  RADIOLOGY & ADDITIONAL STUDIES:      IMPRESSION AND PLAN:

## 2023-04-22 NOTE — PATIENT PROFILE ADULT - FALL HARM RISK - HARM RISK INTERVENTIONS

## 2023-04-22 NOTE — CONSULT NOTE ADULT - ASSESSMENT
77 y.o Female with PMHx of HTN, Aortic stenosis, atrial tachycardia, chronic venous stasis and OA BIBEMS for chief complaint of fall. Patient states she is here because her doctor told her to come in for evaluation of her legs which she states have been swelling up admitted for CHF exacerbation/critical AS. GI was consulted for anemia. Patient denies any abdominal pain, nausea, vomiting, hematemesis, melena, hematochezia, weight loss.     #)Chronic iron deficiency anemia   -hemodynamically stable   -Hb no baseline in the system  -Admitted with hb of 7.4  -Denies any melena, or hematochezia  -EGD and colonoscopy long time ago as per patient   -refused rectal exam    Recs:  trend CBC, keep Hb>7  active type and screen   offered that EGD and colonoscopy as an option to evaluate for any malignancy or evidence of bleeding she refused any endoscopic intervention at this time. Risks and benefits explained    recall as needed   77 y.o Female with PMHx of HTN, Aortic stenosis, atrial tachycardia, chronic venous stasis and OA BIBEMS for chief complaint of fall. Patient states she is here because her doctor told her to come in for evaluation of her legs which she states have been swelling up admitted for CHF exacerbation/critical AS. GI was consulted for anemia. Patient denies any abdominal pain, nausea, vomiting, hematemesis, melena, hematochezia, weight loss.     #)Chronic iron deficiency anemia   -hemodynamically stable   -Hb no baseline in the system  -Admitted with hb of 7.4  -Denies any melena, or hematochezia  -EGD and colonoscopy long time ago as per patient   -refused rectal exam    Recs:  trend CBC, keep Hb>7  active type and screen   offered that EGD and colonoscopy as an option to evaluate for any malignancy or evidence of bleeding she refused any endoscopic intervention at this time. Risks and benefits explained  Outpatient follow up

## 2023-04-22 NOTE — PROGRESS NOTE ADULT - SUBJECTIVE AND OBJECTIVE BOX
Outpt cardiologist: Dr. Walker     HPI:  77 y.o Female with PMHx of HTN, Aortic stenosis, atrial tachycardia, chronic venous stasis and OA BIBEMS for chief complaint of fall. Patient states she is here because her doctor told her to come in for evaluation of her legs which she states have been swelling up. She did fall earlier today but she was sliding to the side of her couch and landed on her buttock. She states she has been experiencing difficulty walking because of her legs being swollen and she feels a burning pain in her left ankle. Her legs are seeping fluid, and has bilateral ulcers. Denies any pain, head injury or being on any blood thinners. Pt denies any urinary or bowel incontinence , back or neck pain, numbness, tingling or focal weakness.    Vital Signs Last 24 Hrs  T(C): 36.3 (2023 14:27), Max: 36.3 (2023 14:27)  T(F): 97.3 (2023 14:27), Max: 97.3 (2023 14:27)  HR: 94 (2023 14:27) (94 - 94)  BP: 122/70 (2023 14:27) (122/70 - 122/70)  BP(mean): --  RR: 14 (2023 14:27) (14 - 14)  SpO2: 98% (2023 14:27) (98% - 98%)    Parameters below as of 2023 14:27  Patient On (Oxygen Delivery Method): room air      PAST MEDICAL & SURGICAL HISTORY  H/O atrial tachycardia    Osteoarthritis    Hypertension    Aortic stenosis    No significant past surgical history        FAMILY HISTORY:  FAMILY HISTORY:  No pertinent family history in first degree relatives        SOCIAL HISTORY:  Social History:      ALLERGIES:  Allergy Status Unknown      MEDICATIONS:  aspirin  chewable 81 milliGRAM(s) Oral daily  atorvastatin 40 milliGRAM(s) Oral at bedtime  cefTRIAXone   IVPB 1000 milliGRAM(s) IV Intermittent every 24 hours  chlorhexidine 2% Cloths 1 Application(s) Topical every 12 hours  enoxaparin Injectable 40 milliGRAM(s) SubCutaneous every 24 hours  furosemide   Injectable 40 milliGRAM(s) IV Push daily  iron sucrose IVPB 200 milliGRAM(s) IV Intermittent every 24 hours  metoprolol tartrate 12.5 milliGRAM(s) Oral two times a day  pantoprazole    Tablet 40 milliGRAM(s) Oral two times a day    PRN:      HOME MEDICATIONS:  Home Medications:  furosemide 20 mg oral tablet: 1 orally once a day (2023 00:52)  metOLazone 2.5 mg oral tablet: 1 orally 2 times a week (2023 00:52)      VITALS:   T(F): 98 ( @ 12:00), Max: 99.1 ( @ 00:41)  HR: 67 ( @ 13:00) (66 - 98)  BP: 81/49 ( @ 13:00) (73/42 - 122/70)  BP(mean): 61 ( @ 13:00) (53 - 85)  RR: 20 ( @ 13:00) (14 - 30)  SpO2: 97% ( @ 13:00) (91% - 100%)    I&O's Summary    2023 07:01  -  2023 07:00  --------------------------------------------------------  IN: 460 mL / OUT: 1500 mL / NET: -1040 mL        REVIEW OF SYSTEMS:  CONSTITUTIONAL: No weakness, fevers or chills  HEENT: No visual changes, neck/ear pain  RESPIRATORY: No cough, sob  CARDIOVASCULAR: See HPI  GASTROINTESTINAL: No abdominal pain. No nausea, vomiting, diarrhea   GENITOURINARY: No dysuria, frequency or hematuria  NEUROLOGICAL: No new focal deficits  SKIN: No new rashes    PHYSICAL EXAM:  General: Not in distress.  Non-toxic appearing.   HEENT: EOMI  Cardio: regular, S1, S2, systolic murmur  Pulm: B/L BS.  No wheezing / (+) crackles /bl base   Abdomen: Soft, non-tender, non-distended. Normoactive bowel sounds  Extremities: No edema b/l le  Neuro: A&O x3. No focal deficits    LABS:                        7.0    7.29  )-----------( 433      ( 2023 04:47 )             24.2         143  |  108  |  16  ----------------------------<  117<H>  3.5   |  26  |  0.8    Ca    7.7<L>      2023 04:47  Phos  3.9       Mg     1.7         TPro  5.1<L>  /  Alb  2.3<L>  /  TBili  0.3  /  DBili  x   /  AST  29  /  ALT  13  /  AlkPhos  74        Lactate, Blood: 1.1 mmol/L (23 @ 04:47)  Troponin T, Serum: 0.22 ng/mL *HH* (23 @ 17:05)    CARDIAC MARKERS ( 2023 17:05 )  x     / 0.22 ng/mL / x     / x     / x      CARDIAC MARKERS ( 2023 11:22 )  x     / 0.22 ng/mL / x     / x     / x      CARDIAC MARKERS ( 2023 06:12 )  x     / 0.15 ng/mL / x     / x     / x      CARDIAC MARKERS ( 2023 00:47 )  x     / 0.07 ng/mL / x     / x     / x      CARDIAC MARKERS ( 2023 17:15 )  x     / 0.06 ng/mL / x     / x     / x      CARDIAC MARKERS ( 2023 17:03 )  x     / x     / 332 U/L / x     / x         Chol 87 LDL -- HDL 25<L> Trig 74  COVID-19 PCR: NotDetec (2023 20:39)      RADIOLOGY:  Echo Complete w/ Contrast w/o Doppler (23):    1. Hyperdynamic global left ventricular systolic function.   2. LV Ejection Fraction by Salcido's Method with a biplane EF of 75 %.   3. Moderate concentric left ventricular hypertrophy.   4. There is an intracavitary gradient up to 48mmHg at rest.   5. Severe biatrial dilation.   6. Normal right ventricular size and function.   7. Severe 'critical' aortic valve stenosis due to fibrocalcific AV. Vmax 5.49 m/s, MG 74 mmHg, and HARRIET 0.63cm^2.   8. Mild mitral stenosis due to degenerative MV, with mean gradient of 4.0 mmHg at HR 97 bpm.   9. Mild-moderate tricuspid regurgitation.  10. Estimated pulmonary artery systolic pressure is 80.9 mmHg assuming a right atrial pressure of 8 mmHg, which is consistent with severe pulmonary hypertension.    EC Lead ECG:   Ventricular Rate 80 BPM    Atrial Rate 80 BPM    P-R Interval 162 ms    QRS Duration 156 ms    Q-T Interval 518 ms    QTC Calculation(Bazett) 597 ms    P Axis 60 degrees    R Axis -72 degrees    T Axis 46 degrees    Diagnosis Line Sinus rhythm with Premature atrial complexes  Right bundle branch block  Left anterior fascicular block  *** Bifascicular block ***  Moderate voltage criteria for LVH, may be normal variant  Abnormal ECG    Confirmed by Pro Campbell ([id number]) on 2023 9:43:47 AM ( @ 05:06)

## 2023-04-22 NOTE — PROGRESS NOTE ADULT - ASSESSMENT
77 y.o Female with PMHx of HTN, Aortic stenosis, atrial tachycardia, chronic venous stasis and OA BIBEMS for chief complaint of fall. Patient states she is here because her doctor told her to come in for evaluation of her legs which she states have been swelling up. She did fall earlier today but she was sliding to the side of her couch and landed on her buttock. She states she has been experiencing difficulty walking because of her legs being swollen and she feels a burning pain in her left ankle.     # Subacute decompensated HFpEF seocndty to Severe AS  # HTN  # UTI  # Microcytic anemia  due to iron def anemia   - hemodynamically stable; denies any melena, or hematochezia  - Hb on admission: 7.4 --> 6.7  - trop: 0.15 --> 0.22--> 0.22  - BNP 26235  - EKG: Diagnosis Line Sinus rhythm with Premature atrial complexes; Right bundle branch block; Left anterior fascicular block  - ECHO (04/21/23): EF 75%, AS (Vmax 5.49 m/s, MG 74 mmHg, and HARRIET 0.63cm^2)  - give 1U prbc with 40mg IV lasix   - c/w aspirin, metoprolol and Atorvastatin   - c/w lasix 40mg IV daily  - c/w abx  - f/u Cx   - trend CBC, keep Hb>7  - active type and screen   - GI consult appreciated: recommends EGD / Colonoscopy, but pt refusing   - CTSx consult pending   - PT eval 77 y.o Female with PMHx of HTN, Aortic stenosis, atrial tachycardia, chronic venous stasis and OA BIBEMS for chief complaint of fall. Patient states she is here because her doctor told her to come in for evaluation of her legs which she states have been swelling up. She did fall earlier today but she was sliding to the side of her couch and landed on her buttock. She states she has been experiencing difficulty walking because of her legs being swollen and she feels a burning pain in her left ankle.       # Subacute decompensated HFpEF seocndty to Severe AS  # HTN  # UTI  # Microcytic anemia  due to iron def anemia   - hemodynamically stable; denies any melena, or hematochezia  - Hb on admission: 7.4 --> 6.7  - trop: 0.15 --> 0.22--> 0.22  - BNP 68354  - EKG: Diagnosis Line Sinus rhythm with Premature atrial complexes; Right bundle branch block; Left anterior fascicular block  - ECHO (04/21/23): EF 75%, AS (Vmax 5.49 m/s, MG 74 mmHg, and HARRIET 0.63cm^2)  - give 1U prbc with 40mg IV lasix   - c/w aspirin, metoprolol and Atorvastatin   - c/w lasix 40mg IV daily  - c/w abx  - f/u Cx   - trend CBC, keep Hb>7  - active type and screen   - GI consult appreciated: recommends EGD / Colonoscopy, but pt refusing   - CTSx consult pending   - PT eval

## 2023-04-22 NOTE — CONSULT NOTE ADULT - SUBJECTIVE AND OBJECTIVE BOX
Surgeon: /Red/Eduardo/Erik    Consult requesting by: dr marshall  cardiologist - linabandar  PMD- Dr. Wong Gupta [phone]    HISTORY OF PRESENT ILLNESS:  77y Female with known hx of aortic stenosis who has refused TAVR evaluation over the past year presents to ED after she fell off her couch-- she denies hitting her head.  She states that it has become very difficult for her to ambulate being her legs are so swollen.  She sees the vascular surgeon regularly for the treatment of lymphedema and does see her cardiologist intermittently.  She reports having to wrap her legs and take "water pills" to help with swelling.  She has developed leg ulcers due to swelling.  She denies chest pain or shortness of breath, but does not exert herself.  She uses a walker to get around her home.  She lives alone.  She denies any lightheadedness or dizziness.  She presented to the ED and ruled in for a NSTEMI via cardiac enzymes.  She was also treated for acute diastolic dysfunction CHF.  And she was found to be significantly anemic, with a Hg of 7.  She was transfused 1 unit of blood and was evaluated by GI.  She denies ever having a hx of bleeding and she currently refuses an EGD or colonoscopy.  She denies melena or BRBPR.  She has never had a GI workup in the past and she does not routinely follow up with her PMD.  She also reports a significant unintentional weight loss over the past few months.  She had a 2D echo that demonstrated a hyperdynamic LV as well as critical AS with an HARRIET of 0.6cm2, and severe pulm HTN.  She is being treated for a UTI and reports getting frequent UTI's.  She was examined at the bedside with her daughter and son in law present and now she is somewhat more agreeable to undergoing workup for TAVR but is refusing SAVR.    NYHA functional class    [ ] Class I (no limitation) [ ] Class II (slight limitation) [x ] Class III (marked limitation) [ ] Class IV (symptoms at rest)  CCS CLass 3    PAST MEDICAL & SURGICAL HISTORY:  H/O atrial tachycardia      Osteoarthritis      Hypertension      Aortic stenosis      significant past surgical history-- pt had breast lumpectomy years ago and the path was benign      MEDICATIONS  (STANDING):  aspirin  chewable 81 milliGRAM(s) Oral daily  atorvastatin 40 milliGRAM(s) Oral at bedtime  cefTRIAXone   IVPB 1000 milliGRAM(s) IV Intermittent every 24 hours  chlorhexidine 2% Cloths 1 Application(s) Topical every 12 hours  enoxaparin Injectable 40 milliGRAM(s) SubCutaneous every 24 hours  furosemide   Injectable 40 milliGRAM(s) IV Push daily  iron sucrose IVPB 200 milliGRAM(s) IV Intermittent every 24 hours  metoprolol tartrate 12.5 milliGRAM(s) Oral two times a day  pantoprazole    Tablet 40 milliGRAM(s) Oral two times a day    MEDICATIONS  (PRN):    Antiplatelet therapy:                           Last dose/amt:    Allergies    Allergy Status Unknown    Intolerances        SOCIAL HISTORY:  Smoker: [ ] Yes  [x ] No        PACK YEARS:                         WHEN QUIT?  ETOH use: [ ] Yes  [x ] No              FREQUENCY / QUANTITY:  Ilicit Drug use:  [ ] Yes  [x ] No  Occupation: retired  Lives with: alone  Assisted device use: walker  5 meter walk test: 1____sec, 2____sec, 3___sec  FAMILY HISTORY:  No pertinent family history in first degree relatives        Review of Systems  CONSTITUTIONAL:  Fevers[ ] chills[ ] sweats[ ] fatigue[ ] weight loss[ ] weight gain [ ]                                     NEGATIVE [X ]   NEURO:  parathesias[ ] seizures [ ]  syncope [ ]  confusion [ ]                                                                                NEGATIVE[ X]   EYES: glasses[ ]  blurry vision[ ]  discharge[ ] pain[ ] glaucoma [ ]                                                                          NEGATIVE[X ]   ENMT:  difficulty hearing [ ]  vertigo[ ]  dysphagia[ ] epistaxis[ ] recent dental work [ ]                                    NEGATIVE[ X]   CV:  chest pain[ ] palpitations[ ] VO [ ] diaphoresis [ ]                                                                                           NEGATIVE[ X]   RESPIRATORY:  wheezing[ ] SOB[ ] cough [ ] sputum[ ] hemoptysis[ ]                                                                  NEGATIVE[ ]   GI:  nausea[ ]  vommiting [ ]  diarrhea[ ] constipation [ ] melena [ ]                                                                      NEGATIVE[ X]   : hematuria[ ]  dysuria[ ] urgency[ ] incontinence[ ]                                                                                            NEGATIVE[ X]   MUSKULOSKELETAL:  arthritis[ ]  joint swelling [ ] muscle weakness [ ] Hx vein stripping [ ]                             NEGATIVE[X ]   SKIN/BREAST:  rash[ ] itching [ ]  hair loss[ ] masses[ ]                                                                                              NEGATIVE[ X]   PSYCH:  dementia [ ] depresion [ ] anxiety[ ]                                                                                                               NEGATIVE[X ]   HEME/LYMPH:  bruises easily[ ] enlarged lymph nodes[ ] tender lymph nodes[ ]                                               NEGATIVE[ X]   ENDOCRINE:  cold intolerance[ ] heat intolerance[ ] polydipsia[ ]                                                                          NEGATIVE[ X]     PHYSICAL EXAM  Vital Signs Last 24 Hrs  T(C): 37.5 (2023 16:00), Max: 37.5 (2023 16:00)  T(F): 99.5 (2023 16:00), Max: 99.5 (2023 16:00)  HR: 70 (2023 16:00) (66 - 85)  BP: 99/51 (2023 16:00) (73/42 - 122/59)  BP(mean): 75 (2023 14:00) (53 - 85)  RR: 18 (2023 16:00) (17 - 33)  SpO2: 97% (2023 14:00) (92% - 100%)    Parameters below as of 2023 16:00  Patient On (Oxygen Delivery Method): nasal cannula  O2 Flow (L/min): 2    Right arm bp: Left arm bp;    CONSTITUTIONAL:                                                                          WNL[ ]   Neuro: WNL[ ] Normal exam oriented to person/place & time with no focal motor or sensory  deficits. Other                     Eyes: WNL[ ]   Normal exam of conjunctiva & lids, pupils equally reactive. Other     ENT: WNL[ ]    Normal exam of nasal/oral mucosa with absence of cyanosis. Other  Neck: WNL[ ]  Normal exam of jugular veins, trachea & thyroid. Other  Chest: WNL[ ] Normal lung exam with good air movement absence of wheezes, rales, or rhonchi: Other                                                                                CV:  Auscultation: normal [ ] S3[ ] S4[ ] Irregular [ ] Rub[ ] Clicks[ ]    Murmurs none:[ ]systolic [ ]  diastolic [ ] holosystolic [ ]  Carotids: No Bruits[ ] Other                 Abdominal Aorta: normal [ ] nonpalpable[ ]Other                                                                                      GI:           WNL[ ] Normal exam of abdomen, liver & spleen with no noted masses or tenderness. Other                                                                                                        Extremities: WNL[ ] Normal no evidence of cyanosis or deformity Edema: none[ ]trace[ ]1+[ ]2+[ ]3+[ ]4+[ ]  Lower Extremity Pulses: Right[ ] Left[ ]Varicosities[ ]  SKIN :WNL[ ] Normal exam to inspection & palation. Other:                                                          LABS:                        7.0    7.29  )-----------( 433      ( 2023 04:47 )             24.2         143  |  108  |  16  ----------------------------<  117<H>  3.5   |  26  |  0.8    Ca    7.7<L>      2023 04:47  Phos  3.9       Mg     1.7         TPro  5.1<L>  /  Alb  2.3<L>  /  TBili  0.3  /  DBili  x   /  AST  29  /  ALT  13  /  AlkPhos  74        Urinalysis Basic - ( 2023 20:26 )    Color: Yellow / Appearance: Turbid / S.020 / pH: x  Gluc: x / Ketone: Small  / Bili: Negative / Urobili: 3 mg/dL   Blood: x / Protein: 30 mg/dL / Nitrite: Positive   Leuk Esterase: Large / RBC: >720 /HPF /  /HPF   Sq Epi: x / Non Sq Epi: x / Bacteria: Moderate      CARDIAC MARKERS ( 2023 17:05 )  x     / 0.22 ng/mL / x     / x     / x      CARDIAC MARKERS ( 2023 11:22 )  x     / 0.22 ng/mL / x     / x     / x      CARDIAC MARKERS ( 2023 06:12 )  x     / 0.15 ng/mL / x     / x     / x      CARDIAC MARKERS ( 2023 00:47 )  x     / 0.07 ng/mL / x     / x     / x      CARDIAC MARKERS ( 2023 17:15 )  x     / 0.06 ng/mL / x     / x     / x      CARDIAC MARKERS ( 2023 17:03 )  x     / x     / 332 U/L / x     / x              Cardiac Cath: not done    TTE / SILVESTRE: ACC: [id number] EXAM:  ECHO TTE WC COMP WO DOPP                          PROCEDURE DATE:  2023          INTERPRETATION:   [place] [address]             Phone: [phone].   TRANSTHORACIC ECHOCARDIOGRAM REPORT        Patient Name:   JESÚS TERRELL Accession #: [id number]  Medical Rec #:  [id number]    Height:      60.0 in 152.4 cm  YOB: 1945    Weight:      199.0 lb 90.27 kg  Patient Age:    77 years     BSA:         1.86 m²  Patient Gender: F            BP:          112/74 mmHg      Date of Exam:        2023 7:58:56 AM  Referring Physician: Service  Sonographer:         Wilfred Dial  Reading Physician:   Marcelo Quan.    Procedure:   2D Echo/Doppler/Color Doppler Complete.  Indications: R07.9 - Chest Pain, unspecified  Diagnosis:   R07.9 - Chest pain, unspecified        Summary:   1. Hyperdynamic global left ventricular systolic function.   2. LV Ejection Fraction by Salcido's Method with a biplane EF of 75 %.   3. Moderate concentric left ventricular hypertrophy.   4. There is an intracavitary gradient up to 48mmHg at rest.   5. Severe biatrial dilation.   6. Normal right ventricular size and function.   7. Severe 'critical' aortic valve stenosis due to fibrocalcific AV. Vmax   5.49 m/s, MG 74 mmHg, and HARRIET 0.63cm^2.   8. Mild mitral stenosis due to degenerative MV, with mean gradient of   4.0 mmHg at HR 97 bpm.   9. Mild-moderate tricuspid regurgitation.  10. Estimated pulmonary artery systolic pressure is 80.9 mmHg assuming a   right atrial pressure of 8 mmHg, which is consistent with severe   pulmonary hypertension.  11. Discussed findings with Dr Silvino Lovelace (primary team).    PHYSICIAN INTERPRETATION:  Left Ventricle: The left ventricular internal cavity size is normal. Left   ventricular wall thickness is moderately increased. There is moderate   concentric left ventricular hypertrophy. Global LV systolic function was   hyperdynamic. The left ventricular diastolic function could not be   assessed in this study.  There is an intracavitary gradient up to 48mmHg at rest.  Right Ventricle: Normal right ventricular size and function.  Left Atrium: Severely enlarged left atrium.  Right Atrium: Severely enlarged right atrium.  Pericardium: Trivial pericardial effusion is present.  Mitral Valve: Severe thickening of the anterior and posterior mitral   valve leaflets. There is severe mitral annular calcification. Mitral   valve mean gradientis 4.0 mmHg consistent with mild mitral stenosis.  Tricuspid Valve: Structurally normal tricuspid valve, with normal leaflet   excursion. Mild-moderate tricuspid regurgitation is visualized. Estimated   pulmonary artery systolic pressure is 80.9 mmHgassuming a right atrial   pressure of 8 mmHg, which is consistent with severe pulmonary   hypertension. Adequate TR velocity was not obtained to accurately assess   RVSP.  Aortic Valve: Severe aortic stenosis is present. No evidence of aortic   valveregurgitation is seen.  Aorta: The aortic root and ascending aorta are structurally normal, with   no evidence of dilitation.  Venous: The inferior vena cava was dilated, with respiratory size   variation greater than 50%.  In comparison to the previous echocardiogram(s): There are no prior   studies on this patient for comparison purposes.      2D AND M-MODE MEASUREMENTS (normal ranges within parentheses):  Left Ventricle:                  Normal   Aorta/Left Atrium:            Normal  IVSd (2D):              1.50 cm (0.7-1.1) Aortic Root (2D):  2.70 cm   (2.4-3.7)  LVPWd (2D):             1.60 cm (0.7-1.1) Left Atrium (2D):  4.80 cm   (1.9-4.0)  LVIDd (2D):             3.70 cm (3.4-5.7)  LVIDs (2D):             2.00 cm  LV FS (2D):   45.9 %   (>25%)  Relative Wall Thickness  0.86    (<0.42)    SPECTRAL DOPPLER ANALYSIS:  LV DIASTOLIC FUNCTION:  MV Peak E: 0.96 m/s Decel Time: 208 msec  MV Peak A: 1.41 m/s  E/A Ratio: 0.68    Aortic Valve:  AoV VMax:    5.49 m/s   AoV Area, Vmax:    0.74 cm² Vmax Indx:    0.40   cm²/m²  AoV VTI:     1.14 m     AoV Area, VTI:     0.63 cm² VTI Indx:     0.34   cm²/m²  AoV Pk Grad: 120.6 mmHg AoV Area, Mn Grad: 0.67 cm² Mn Grad Indx: 0.36   cm²/m²  AoV Mn Grad: 74.0 mmHg    LVOT Vmax: 1.29 m/s  LVOT VTI:  0.23 m  LVOT Diam: 2.00 cm    Mitral Valve:  MV VMax:    1.44 m/s MV P1/2 Time: 60.32 msec  MV Mn Grad: 4.0 mmHg MV Area, PHT: 3.65 cm²    Tricuspid Valve and PA/RV Systolic Pressure: TR Max Velocity: 4.27 m/s RA   Pressure: 8 mmHg RVSP/PASP: 80.9 mmHg    Pulmonic Valve:  PV Max Velocity: 1.54 m/s PV Max P.5 mmHg PV Mean P Marcelo Quan, Electronically signed on 2023 at 10:18:37 AM      ACC: [id number] EXAM:  XR CHEST PORTABLE IMMED 1V   ORDERED BY: CAMERON VARGAS     PROCEDURE DATE:  2023      INTERPRETATION:  Clinical History / Reason for exam: CHF    Comparison : Chest radiograph 2023.    Technique/Positioning: Frontal.    Findings:    Support devices: None.    Cardiac/mediastinum/hilum: Cardiomegaly.    Lung parenchyma/Pleura: Bilateral opacities.    Skeleton/soft tissues: Stable bony structures. Decreased right axillary   soft tissue air.    Impression:    Bilateral opacities and cardiomegaly, unchanged.      EKG  Ventricular Rate 80 BPM    Atrial Rate 80 BPM    P-R Interval 162 ms    QRS Duration 156 ms    Q-T Interval 518 ms    QTC Calculation(Bazett) 597 ms    P Axis 60 degrees    R Axis -72 degrees    T Axis 46 degrees    Diagnosis Line Sinus rhythm withPremature atrial complexes  Right bundle branch block  Left anterior fascicular block  *** Bifascicular block ***  Moderate voltage criteria for LVH, may be normal variant  Abnormal ECG      Recommendation: (Procedures/Evaluations)  CT HEAD Nonn-Contrast:[  ]  CT Chest with /without contrast [ x]  Carotid Duplex :[x ]  ALMAS/PVR: [ ]  PFT : Simple PFT [x ]  Full [ ]  Renal Consult [ ]  Pulmonary Consult: [ ]   Vascular Consult [ ]    Dental Consult [ ]   Hem-Onc Consult [ ]   GI Consult [ ]   Other Consultations :    STS Score: Risk of Mortality:  4.030%  Renal Failure:  5.490%  Permanent Stroke:  1.521%  Prolonged Ventilation:  12.520%  DSW Infection:  0.207%  Reoperation:  4.186%  Morbidity or Mortality:  19.851%  Short Length of Stay:  16.815%  Long Length of Stay:  13.143%    CAD [ ]  Valvular  disease [x ]   Aortic Disease [ ]   JARON: Yes[ ] No [ ]   CKD Stage I [ ] , Stage II [ ] , Stage III [ ], Stage IV [ ]   Anemia: Yes [x ], No [ ]  Diabetes :Yes [ ], No [ ]  Acute MI: Yes [ x], No [ ]   Heart Failure: Yes [ x] , No [ ] HFpEF [ ], HFrEF [ ]        Assessment/ Plan: Patient is a 76 yo female with critical aortic stenosis, severe pulmonary hypertension, and anemia of unknown etiology who is refusing GI workup and is undecided about TAVR  cont present tx as per cardiology  ct surgeon note to follow  pt will need anemia workup and also malignancy workup due to significant weight loss-- pt cannot fully quantify how much weight she has lost  if pt is agreeable she will need pre-op workup consisting of cardiac cath, and tavr protocol ct scans, dental, carotid duplex, and pft's  we will cont to follow her and present her at multi-disciplinary rounds  cont abx for uti                   Surgeon: /Red/Eduardo/Erik    Consult requesting by: dr marshall  cardiologist - annabelle  PMD- Dr. Wong Gupta [phone]    HISTORY OF PRESENT ILLNESS:  77y Female with known hx of aortic stenosis who has refused TAVR evaluation over the past year presents to ED after she fell off her couch-- she denies hitting her head.  She states that it has become very difficult for her to ambulate being her legs are so swollen.  She sees the vascular surgeon regularly for the treatment of lymphedema and does see her cardiologist intermittently.  She reports having to wrap her legs and take "water pills" to help with swelling.  She has developed leg ulcers due to swelling.  She denies chest pain or shortness of breath, but does not exert herself.  She uses a walker to get around her home.  She lives alone.  She denies any lightheadedness or dizziness.  She presented to the ED and ruled in for a NSTEMI via cardiac enzymes.  She was also treated for acute diastolic dysfunction CHF.  And she was found to be significantly anemic, with a Hg of 7.  She was transfused 1 unit of blood and was evaluated by GI.  She denies ever having a hx of bleeding and she currently refuses an EGD or colonoscopy.  She denies melena or BRBPR.  She has never had a GI workup in the past and she does not routinely follow up with her PMD.  She also reports a significant unintentional weight loss over the past few months.  She had a 2D echo that demonstrated a hyperdynamic LV as well as critical AS with an HARRIET of 0.6cm2, and severe pulm HTN.  She is being treated for a UTI and reports getting frequent UTI's.  She was examined at the bedside with her daughter and son in law present and now she is somewhat more agreeable to undergoing workup for TAVR but is refusing SAVR.    NYHA functional class    [ ] Class I (no limitation) [ ] Class II (slight limitation) [x ] Class III (marked limitation) [ ] Class IV (symptoms at rest)  CCS CLass 3    PAST MEDICAL & SURGICAL HISTORY:  H/O atrial tachycardia      Osteoarthritis      Hypertension      Aortic stenosis      significant past surgical history-- pt had breast lumpectomy years ago and the path was benign      MEDICATIONS  (STANDING):  aspirin  chewable 81 milliGRAM(s) Oral daily  atorvastatin 40 milliGRAM(s) Oral at bedtime  cefTRIAXone   IVPB 1000 milliGRAM(s) IV Intermittent every 24 hours  chlorhexidine 2% Cloths 1 Application(s) Topical every 12 hours  enoxaparin Injectable 40 milliGRAM(s) SubCutaneous every 24 hours  furosemide   Injectable 40 milliGRAM(s) IV Push daily  iron sucrose IVPB 200 milliGRAM(s) IV Intermittent every 24 hours  metoprolol tartrate 12.5 milliGRAM(s) Oral two times a day  pantoprazole    Tablet 40 milliGRAM(s) Oral two times a day    MEDICATIONS  (PRN):    Antiplatelet therapy:                           Last dose/amt:    Allergies    Allergy Status Unknown    Intolerances        SOCIAL HISTORY:  Smoker: [ ] Yes  [x ] No        PACK YEARS:                         WHEN QUIT?  ETOH use: [ ] Yes  [x ] No              FREQUENCY / QUANTITY:  Ilicit Drug use:  [ ] Yes  [x ] No  Occupation: retired  Lives with: alone  Assisted device use: walker  5 meter walk test: 1____sec, 2____sec, 3___sec  FAMILY HISTORY:  No pertinent family history in first degree relatives        Review of Systems  CONSTITUTIONAL:  Fevers[ ] chills[ ] sweats[ ] fatigue[ ] weight loss[ ] weight gain [ ]                                     NEGATIVE [X ]   NEURO:  parathesias[ ] seizures [ ]  syncope [ ]  confusion [ ]                                                                                NEGATIVE[ X]   EYES: glasses[ ]  blurry vision[ ]  discharge[ ] pain[ ] glaucoma [ ]                                                                          NEGATIVE[X ]   ENMT:  difficulty hearing [ ]  vertigo[ ]  dysphagia[ ] epistaxis[ ] recent dental work [ ]                                    NEGATIVE[ X]   CV:  chest pain[ ] palpitations[ ] VO [ ] diaphoresis [ ]                                                                                           NEGATIVE[ X]   RESPIRATORY:  wheezing[ ] SOB[ ] cough [ ] sputum[ ] hemoptysis[ ]                                                                  NEGATIVE[ ]   GI:  nausea[ ]  vommiting [ ]  diarrhea[ ] constipation [ ] melena [ ]                                                                      NEGATIVE[ X]   : hematuria[ ]  dysuria[ ] urgency[ ] incontinence[ ]                                                                                            NEGATIVE[ X]   MUSKULOSKELETAL:  arthritis[ ]  joint swelling [ ] muscle weakness [ ] Hx vein stripping [ ]                             NEGATIVE[X ]   SKIN/BREAST:  rash[ ] itching [ ]  hair loss[ ] masses[ ]                                                                                              NEGATIVE[ X]   PSYCH:  dementia [ ] depresion [ ] anxiety[ ]                                                                                                               NEGATIVE[X ]   HEME/LYMPH:  bruises easily[ ] enlarged lymph nodes[ ] tender lymph nodes[ ]                                               NEGATIVE[ X]   ENDOCRINE:  cold intolerance[ ] heat intolerance[ ] polydipsia[ ]                                                                          NEGATIVE[ X]     PHYSICAL EXAM  Vital Signs Last 24 Hrs  T(C): 37.5 (2023 16:00), Max: 37.5 (2023 16:00)  T(F): 99.5 (2023 16:00), Max: 99.5 (2023 16:00)  HR: 70 (2023 16:00) (66 - 85)  BP: 99/51 (2023 16:00) (73/42 - 122/59)  BP(mean): 75 (2023 14:00) (53 - 85)  RR: 18 (2023 16:00) (17 - 33)  SpO2: 97% (2023 14:00) (92% - 100%)    Parameters below as of 2023 16:00  Patient On (Oxygen Delivery Method): nasal cannula  O2 Flow (L/min): 2    Right arm bp: Left arm bp;    CONSTITUTIONAL:                                                                          WNL[ ]   Neuro: WNL[ ] Normal exam oriented to person/place & time with no focal motor or sensory  deficits. Other                     Eyes: WNL[ ]   Normal exam of conjunctiva & lids, pupils equally reactive. Other     ENT: WNL[ ]    Normal exam of nasal/oral mucosa with absence of cyanosis. Other  Neck: WNL[ ]  Normal exam of jugular veins, trachea & thyroid. Other  Chest: WNL[ ] Normal lung exam with good air movement absence of wheezes, rales, or rhonchi: Other                                                                                CV:  Auscultation: normal [ ] S3[ ] S4[ ] Irregular [ ] Rub[ ] Clicks[ ]    Murmurs none:[ ]systolic [ ]  diastolic [ ] holosystolic [ ]  Carotids: No Bruits[ ] Other                 Abdominal Aorta: normal [ ] nonpalpable[ ]Other                                                                                      GI:           WNL[ ] Normal exam of abdomen, liver & spleen with no noted masses or tenderness. Other                                                                                                        Extremities: WNL[ ] Normal no evidence of cyanosis or deformity Edema: none[ ]trace[ ]1+[ ]2+[ ]3+[ ]4+[ ]  Lower Extremity Pulses: Right[ ] Left[ ]Varicosities[ ]  SKIN :WNL[ ] Normal exam to inspection & palation. Other:                                                          LABS:                        7.0    7.29  )-----------( 433      ( 2023 04:47 )             24.2         143  |  108  |  16  ----------------------------<  117<H>  3.5   |  26  |  0.8    Ca    7.7<L>      2023 04:47  Phos  3.9       Mg     1.7         TPro  5.1<L>  /  Alb  2.3<L>  /  TBili  0.3  /  DBili  x   /  AST  29  /  ALT  13  /  AlkPhos  74        Urinalysis Basic - ( 2023 20:26 )    Color: Yellow / Appearance: Turbid / S.020 / pH: x  Gluc: x / Ketone: Small  / Bili: Negative / Urobili: 3 mg/dL   Blood: x / Protein: 30 mg/dL / Nitrite: Positive   Leuk Esterase: Large / RBC: >720 /HPF /  /HPF   Sq Epi: x / Non Sq Epi: x / Bacteria: Moderate      CARDIAC MARKERS ( 2023 17:05 )  x     / 0.22 ng/mL / x     / x     / x      CARDIAC MARKERS ( 2023 11:22 )  x     / 0.22 ng/mL / x     / x     / x      CARDIAC MARKERS ( 2023 06:12 )  x     / 0.15 ng/mL / x     / x     / x      CARDIAC MARKERS ( 2023 00:47 )  x     / 0.07 ng/mL / x     / x     / x      CARDIAC MARKERS ( 2023 17:15 )  x     / 0.06 ng/mL / x     / x     / x      CARDIAC MARKERS ( 2023 17:03 )  x     / x     / 332 U/L / x     / x              Cardiac Cath: not done    TTE / SILVESTRE: ACC: [id number] EXAM:  ECHO TTE WC COMP WO DOPP                          PROCEDURE DATE:  2023          INTERPRETATION:   [place] [address]             Phone: [phone].   TRANSTHORACIC ECHOCARDIOGRAM REPORT        Patient Name:   JESÚS TERRELL Accession #: [id number]  Medical Rec #:  [id number]    Height:      60.0 in 152.4 cm  YOB: 1945    Weight:      199.0 lb 90.27 kg  Patient Age:    77 years     BSA:         1.86 m²  Patient Gender: F            BP:          112/74 mmHg      Date of Exam:        2023 7:58:56 AM  Referring Physician: Service  Sonographer:         Wilfred Dial  Reading Physician:   Marcelo Quan.    Procedure:   2D Echo/Doppler/Color Doppler Complete.  Indications: R07.9 - Chest Pain, unspecified  Diagnosis:   R07.9 - Chest pain, unspecified        Summary:   1. Hyperdynamic global left ventricular systolic function.   2. LV Ejection Fraction by Salcido's Method with a biplane EF of 75 %.   3. Moderate concentric left ventricular hypertrophy.   4. There is an intracavitary gradient up to 48mmHg at rest.   5. Severe biatrial dilation.   6. Normal right ventricular size and function.   7. Severe 'critical' aortic valve stenosis due to fibrocalcific AV. Vmax   5.49 m/s, MG 74 mmHg, and HARRIET 0.63cm^2.   8. Mild mitral stenosis due to degenerative MV, with mean gradient of   4.0 mmHg at HR 97 bpm.   9. Mild-moderate tricuspid regurgitation.  10. Estimated pulmonary artery systolic pressure is 80.9 mmHg assuming a   right atrial pressure of 8 mmHg, which is consistent with severe   pulmonary hypertension.  11. Discussed findings with Dr Silvino Lovelace (primary team).    PHYSICIAN INTERPRETATION:  Left Ventricle: The left ventricular internal cavity size is normal. Left   ventricular wall thickness is moderately increased. There is moderate   concentric left ventricular hypertrophy. Global LV systolic function was   hyperdynamic. The left ventricular diastolic function could not be   assessed in this study.  There is an intracavitary gradient up to 48mmHg at rest.  Right Ventricle: Normal right ventricular size and function.  Left Atrium: Severely enlarged left atrium.  Right Atrium: Severely enlarged right atrium.  Pericardium: Trivial pericardial effusion is present.  Mitral Valve: Severe thickening of the anterior and posterior mitral   valve leaflets. There is severe mitral annular calcification. Mitral   valve mean gradientis 4.0 mmHg consistent with mild mitral stenosis.  Tricuspid Valve: Structurally normal tricuspid valve, with normal leaflet   excursion. Mild-moderate tricuspid regurgitation is visualized. Estimated   pulmonary artery systolic pressure is 80.9 mmHgassuming a right atrial   pressure of 8 mmHg, which is consistent with severe pulmonary   hypertension. Adequate TR velocity was not obtained to accurately assess   RVSP.  Aortic Valve: Severe aortic stenosis is present. No evidence of aortic   valveregurgitation is seen.  Aorta: The aortic root and ascending aorta are structurally normal, with   no evidence of dilitation.  Venous: The inferior vena cava was dilated, with respiratory size   variation greater than 50%.  In comparison to the previous echocardiogram(s): There are no prior   studies on this patient for comparison purposes.      2D AND M-MODE MEASUREMENTS (normal ranges within parentheses):  Left Ventricle:                  Normal   Aorta/Left Atrium:            Normal  IVSd (2D):              1.50 cm (0.7-1.1) Aortic Root (2D):  2.70 cm   (2.4-3.7)  LVPWd (2D):             1.60 cm (0.7-1.1) Left Atrium (2D):  4.80 cm   (1.9-4.0)  LVIDd (2D):             3.70 cm (3.4-5.7)  LVIDs (2D):             2.00 cm  LV FS (2D):   45.9 %   (>25%)  Relative Wall Thickness  0.86    (<0.42)    SPECTRAL DOPPLER ANALYSIS:  LV DIASTOLIC FUNCTION:  MV Peak E: 0.96 m/s Decel Time: 208 msec  MV Peak A: 1.41 m/s  E/A Ratio: 0.68    Aortic Valve:  AoV VMax:    5.49 m/s   AoV Area, Vmax:    0.74 cm² Vmax Indx:    0.40   cm²/m²  AoV VTI:     1.14 m     AoV Area, VTI:     0.63 cm² VTI Indx:     0.34   cm²/m²  AoV Pk Grad: 120.6 mmHg AoV Area, Mn Grad: 0.67 cm² Mn Grad Indx: 0.36   cm²/m²  AoV Mn Grad: 74.0 mmHg    LVOT Vmax: 1.29 m/s  LVOT VTI:  0.23 m  LVOT Diam: 2.00 cm    Mitral Valve:  MV VMax:    1.44 m/s MV P1/2 Time: 60.32 msec  MV Mn Grad: 4.0 mmHg MV Area, PHT: 3.65 cm²    Tricuspid Valve and PA/RV Systolic Pressure: TR Max Velocity: 4.27 m/s RA   Pressure: 8 mmHg RVSP/PASP: 80.9 mmHg    Pulmonic Valve:  PV Max Velocity: 1.54 m/s PV Max P.5 mmHg PV Mean P Marcelo Quan, Electronically signed on 2023 at 10:18:37 AM      ACC: [id number] EXAM:  XR CHEST PORTABLE IMMED 1V   ORDERED BY: CAMERON VARGAS     PROCEDURE DATE:  2023      INTERPRETATION:  Clinical History / Reason for exam: CHF    Comparison : Chest radiograph 2023.    Technique/Positioning: Frontal.    Findings:    Support devices: None.    Cardiac/mediastinum/hilum: Cardiomegaly.    Lung parenchyma/Pleura: Bilateral opacities.    Skeleton/soft tissues: Stable bony structures. Decreased right axillary   soft tissue air.    Impression:    Bilateral opacities and cardiomegaly, unchanged.      EKG  Ventricular Rate 80 BPM    Atrial Rate 80 BPM    P-R Interval 162 ms    QRS Duration 156 ms    Q-T Interval 518 ms    QTC Calculation(Bazett) 597 ms    P Axis 60 degrees    R Axis -72 degrees    T Axis 46 degrees    Diagnosis Line Sinus rhythm withPremature atrial complexes  Right bundle branch block  Left anterior fascicular block  *** Bifascicular block ***  Moderate voltage criteria for LVH, may be normal variant  Abnormal ECG      Recommendation: (Procedures/Evaluations)  CT HEAD Nonn-Contrast:[  ]  CT Chest with /without contrast [ x]  Carotid Duplex :[x ]  ALMAS/PVR: [ ]  PFT : Simple PFT [x ]  Full [ ]  Renal Consult [ ]  Pulmonary Consult: [ ]   Vascular Consult [ ]    Dental Consult [ ]   Hem-Onc Consult [ ]   GI Consult [ ]   Other Consultations :    STS Score: Risk of Mortality:  4.030%  Renal Failure:  5.490%  Permanent Stroke:  1.521%  Prolonged Ventilation:  12.520%  DSW Infection:  0.207%  Reoperation:  4.186%  Morbidity or Mortality:  19.851%  Short Length of Stay:  16.815%  Long Length of Stay:  13.143%    CAD [ ]  Valvular  disease [x ]   Aortic Disease [ ]   JARON: Yes[ ] No [ ]   CKD Stage I [ ] , Stage II [ ] , Stage III [ ], Stage IV [ ]   Anemia: Yes [x ], No [ ]  Diabetes :Yes [ ], No [ ]  Acute MI: Yes [ x], No [ ]   Heart Failure: Yes [ x] , No [ ] HFpEF [ ], HFrEF [ ]        Assessment/ Plan: Patient is a 76 yo female with critical aortic stenosis, severe pulmonary hypertension, and anemia of unknown etiology who is refusing GI workup and is undecided about TAVR  cont present tx as per cardiology  ct surgeon note to follow  pt will need anemia workup and also malignancy workup due to significant weight loss-- pt cannot fully quantify how much weight she has lost  if pt is agreeable she will need pre-op workup consisting of cardiac cath, and tavr protocol ct scans, dental, carotid duplex, and pft's  we will cont to follow her and present her at multi-disciplinary rounds  cont abx for uti                   Surgeon: /Red/Eduardo/Erik    Consult requesting by: dr marshall  cardiologist - annabelle  PMD- Dr. Wong Gupta [phone]    HISTORY OF PRESENT ILLNESS:  77y Female with known hx of aortic stenosis who has refused TAVR evaluation over the past year presents to ED after she fell off her couch-- she denies hitting her head.  She states that it has become very difficult for her to ambulate being her legs are so swollen.  She sees the vascular surgeon regularly for the treatment of lymphedema and does see her cardiologist intermittently.  She reports having to wrap her legs and take "water pills" to help with swelling.  She has developed leg ulcers due to swelling.  She denies chest pain or shortness of breath, but does not exert herself.  She uses a walker to get around her home.  She lives alone.  She denies any lightheadedness or dizziness.  She presented to the ED and ruled in for a NSTEMI via cardiac enzymes.  She was also treated for acute diastolic dysfunction CHF.  And she was found to be significantly anemic, with a Hg of 7.  She was transfused 1 unit of blood and was evaluated by GI.  She denies ever having a hx of bleeding and she currently refuses an EGD or colonoscopy.  She denies melena or BRBPR.  She has never had a GI workup in the past and she does not routinely follow up with her PMD.  She also reports a significant unintentional weight loss over the past few months.  She had a 2D echo that demonstrated a hyperdynamic LV as well as critical AS with an HARRIET of 0.6cm2, and severe pulm HTN.  She is being treated for a UTI and reports getting frequent UTI's.  She was examined at the bedside with her daughter and son in law present and now she is somewhat more agreeable to undergoing workup for TAVR but is refusing SAVR.    NYHA functional class    [ ] Class I (no limitation) [ ] Class II (slight limitation) [x ] Class III (marked limitation) [ ] Class IV (symptoms at rest)  CCS CLass 3  5MW test - not done being the patient feels weak    PAST MEDICAL & SURGICAL HISTORY:  H/O atrial tachycardia      Osteoarthritis      Hypertension      Aortic stenosis      significant past surgical history-- pt had breast lumpectomy years ago and the path was benign      MEDICATIONS  (STANDING):  aspirin  chewable 81 milliGRAM(s) Oral daily  atorvastatin 40 milliGRAM(s) Oral at bedtime  cefTRIAXone   IVPB 1000 milliGRAM(s) IV Intermittent every 24 hours  chlorhexidine 2% Cloths 1 Application(s) Topical every 12 hours  enoxaparin Injectable 40 milliGRAM(s) SubCutaneous every 24 hours  furosemide   Injectable 40 milliGRAM(s) IV Push daily  iron sucrose IVPB 200 milliGRAM(s) IV Intermittent every 24 hours  metoprolol tartrate 12.5 milliGRAM(s) Oral two times a day  pantoprazole    Tablet 40 milliGRAM(s) Oral two times a day    MEDICATIONS  (PRN):    Antiplatelet therapy:                           Last dose/amt:    Allergies    Allergy Status Unknown    Intolerances        SOCIAL HISTORY:  Smoker: [ ] Yes  [x ] No        PACK YEARS:                         WHEN QUIT?  ETOH use: [ ] Yes  [x ] No              FREQUENCY / QUANTITY:  Ilicit Drug use:  [ ] Yes  [x ] No  Occupation: retired  Lives with: alone  Assisted device use: walker  5 meter walk test: 1____sec, 2____sec, 3___sec  FAMILY HISTORY:  No pertinent family history in first degree relatives        Review of Systems  CONSTITUTIONAL:  Fevers[ ] chills[ ] sweats[ ] fatigue[ ] weight loss[ ] weight gain [ ]                                     NEGATIVE [X ]   NEURO:  parathesias[ ] seizures [ ]  syncope [ ]  confusion [ ]                                                                                NEGATIVE[ X]   EYES: glasses[ ]  blurry vision[ ]  discharge[ ] pain[ ] glaucoma [ ]                                                                          NEGATIVE[X ]   ENMT:  difficulty hearing [ ]  vertigo[ ]  dysphagia[ ] epistaxis[ ] recent dental work [ ]                                    NEGATIVE[ X]   CV:  chest pain[ ] palpitations[ ] VO [ ] diaphoresis [ ]                                                                                           NEGATIVE[ X]   RESPIRATORY:  wheezing[ ] SOB[ ] cough [ ] sputum[ ] hemoptysis[ ]                                                                  NEGATIVE[ ]   GI:  nausea[ ]  vommiting [ ]  diarrhea[ ] constipation [ ] melena [ ]                                                                      NEGATIVE[ X]   : hematuria[ ]  dysuria[ ] urgency[ ] incontinence[ ]                                                                                            NEGATIVE[ X]   MUSKULOSKELETAL:  arthritis[ ]  joint swelling [ ] muscle weakness [ ] Hx vein stripping [ ]                             NEGATIVE[X ]   SKIN/BREAST:  rash[ ] itching [ ]  hair loss[ ] masses[ ]                                                                                              NEGATIVE[ X]   PSYCH:  dementia [ ] depresion [ ] anxiety[ ]                                                                                                               NEGATIVE[X ]   HEME/LYMPH:  bruises easily[ ] enlarged lymph nodes[ ] tender lymph nodes[ ]                                               NEGATIVE[ X]   ENDOCRINE:  cold intolerance[ ] heat intolerance[ ] polydipsia[ ]                                                                          NEGATIVE[ X]     PHYSICAL EXAM  Vital Signs Last 24 Hrs  T(C): 37.5 (2023 16:00), Max: 37.5 (2023 16:00)  T(F): 99.5 (2023 16:00), Max: 99.5 (2023 16:00)  HR: 70 (2023 16:00) (66 - 85)  BP: 99/51 (2023 16:00) (73/42 - 122/59)  BP(mean): 75 (2023 14:00) (53 - 85)  RR: 18 (2023 16:00) (17 - 33)  SpO2: 97% (2023 14:00) (92% - 100%)    Parameters below as of 2023 16:00  Patient On (Oxygen Delivery Method): nasal cannula  O2 Flow (L/min): 2    Right arm bp: Left arm bp;    CONSTITUTIONAL:                                                                          WNL[ ]   Neuro: WNL[ ] Normal exam oriented to person/place & time with no focal motor or sensory  deficits. Other                     Eyes: WNL[ ]   Normal exam of conjunctiva & lids, pupils equally reactive. Other     ENT: WNL[ ]    Normal exam of nasal/oral mucosa with absence of cyanosis. Other  Neck: WNL[ ]  Normal exam of jugular veins, trachea & thyroid. Other  Chest: WNL[ ] Normal lung exam with good air movement absence of wheezes, rales, or rhonchi: Other                                                                                CV:  Auscultation: normal [ ] S3[ ] S4[ ] Irregular [ ] Rub[ ] Clicks[ ]    Murmurs none:[ ]systolic [ ]  diastolic [ ] holosystolic [ ]  Carotids: No Bruits[ ] Other                 Abdominal Aorta: normal [ ] nonpalpable[ ]Other                                                                                      GI:           WNL[ ] Normal exam of abdomen, liver & spleen with no noted masses or tenderness. Other                                                                                                        Extremities: WNL[ ] Normal no evidence of cyanosis or deformity Edema: none[ ]trace[ ]1+[ ]2+[ ]3+[ ]4+[ ]  Lower Extremity Pulses: Right[ ] Left[ ]Varicosities[ ]  SKIN :WNL[ ] Normal exam to inspection & palation. Other:                                                          LABS:                        7.0    7.29  )-----------( 433      ( 2023 04:47 )             24.2     04-22    143  |  108  |  16  ----------------------------<  117<H>  3.5   |  26  |  0.8    Ca    7.7<L>      2023 04:47  Phos  3.9     04-21  Mg     1.7         TPro  5.1<L>  /  Alb  2.3<L>  /  TBili  0.3  /  DBili  x   /  AST  29  /  ALT  13  /  AlkPhos  74  -22      Urinalysis Basic - ( 2023 20:26 )    Color: Yellow / Appearance: Turbid / S.020 / pH: x  Gluc: x / Ketone: Small  / Bili: Negative / Urobili: 3 mg/dL   Blood: x / Protein: 30 mg/dL / Nitrite: Positive   Leuk Esterase: Large / RBC: >720 /HPF /  /HPF   Sq Epi: x / Non Sq Epi: x / Bacteria: Moderate      CARDIAC MARKERS ( 2023 17:05 )  x     / 0.22 ng/mL / x     / x     / x      CARDIAC MARKERS ( 2023 11:22 )  x     / 0.22 ng/mL / x     / x     / x      CARDIAC MARKERS ( 2023 06:12 )  x     / 0.15 ng/mL / x     / x     / x      CARDIAC MARKERS ( 2023 00:47 )  x     / 0.07 ng/mL / x     / x     / x      CARDIAC MARKERS ( 2023 17:15 )  x     / 0.06 ng/mL / x     / x     / x      CARDIAC MARKERS ( 2023 17:03 )  x     / x     / 332 U/L / x     / x              Cardiac Cath: not done    TTE / SILVESTRE: ACC: [id number] EXAM:  ECHO TTE  COMP WO DOPP                          PROCEDURE DATE:  2023          INTERPRETATION:   [address] [address]             Phone: [phone].   TRANSTHORACIC ECHOCARDIOGRAM REPORT        Patient Name:   JESÚS TERRELL Accession #: [id number]  Medical Rec #:  [id number]    Height:      60.0 in 152.4 cm  YOB: 1945    Weight:      199.0 lb 90.27 kg  Patient Age:    77 years     BSA:         1.86 m²  Patient Gender: F            BP:          112/74 mmHg      Date of Exam:        2023 7:58:56 AM  Referring Physician: Service  Sonographer:         Wilfred Dial  Reading Physician:   Marcelo Quan.    Procedure:   2D Echo/Doppler/Color Doppler Complete.  Indications: R07.9 - Chest Pain, unspecified  Diagnosis:   R07.9 - Chest pain, unspecified        Summary:   1. Hyperdynamic global left ventricular systolic function.   2. LV Ejection Fraction by Salcido's Method with a biplane EF of 75 %.   3. Moderate concentric left ventricular hypertrophy.   4. There is an intracavitary gradient up to 48mmHg at rest.   5. Severe biatrial dilation.   6. Normal right ventricular size and function.   7. Severe 'critical' aortic valve stenosis due to fibrocalcific AV. Vmax   5.49 m/s, MG 74 mmHg, and HARRIET 0.63cm^2.   8. Mild mitral stenosis due to degenerative MV, with mean gradient of   4.0 mmHg at HR 97 bpm.   9. Mild-moderate tricuspid regurgitation.  10. Estimated pulmonary artery systolic pressure is 80.9 mmHg assuming a   right atrial pressure of 8 mmHg, which is consistent with severe   pulmonary hypertension.  11. Discussed findings with Dr Silvino Lovelace (primary team).    PHYSICIAN INTERPRETATION:  Left Ventricle: The left ventricular internal cavity size is normal. Left   ventricular wall thickness is moderately increased. There is moderate   concentric left ventricular hypertrophy. Global LV systolic function was   hyperdynamic. The left ventricular diastolic function could not be   assessed in this study.  There is an intracavitary gradient up to 48mmHg at rest.  Right Ventricle: Normal right ventricular size and function.  Left Atrium: Severely enlarged left atrium.  Right Atrium: Severely enlarged right atrium.  Pericardium: Trivial pericardial effusion is present.  Mitral Valve: Severe thickening of the anterior and posterior mitral   valve leaflets. There is severe mitral annular calcification. Mitral   valve mean gradientis 4.0 mmHg consistent with mild mitral stenosis.  Tricuspid Valve: Structurally normal tricuspid valve, with normal leaflet   excursion. Mild-moderate tricuspid regurgitation is visualized. Estimated   pulmonary artery systolic pressure is 80.9 mmHgassuming a right atrial   pressure of 8 mmHg, which is consistent with severe pulmonary   hypertension. Adequate TR velocity was not obtained to accurately assess   RVSP.  Aortic Valve: Severe aortic stenosis is present. No evidence of aortic   valveregurgitation is seen.  Aorta: The aortic root and ascending aorta are structurally normal, with   no evidence of dilitation.  Venous: The inferior vena cava was dilated, with respiratory size   variation greater than 50%.  In comparison to the previous echocardiogram(s): There are no prior   studies on this patient for comparison purposes.      2D AND M-MODE MEASUREMENTS (normal ranges within parentheses):  Left Ventricle:                  Normal   Aorta/Left Atrium:            Normal  IVSd (2D):              1.50 cm (0.7-1.1) Aortic Root (2D):  2.70 cm   (2.4-3.7)  LVPWd (2D):             1.60 cm (0.7-1.1) Left Atrium (2D):  4.80 cm   (1.9-4.0)  LVIDd (2D):             3.70 cm (3.4-5.7)  LVIDs (2D):             2.00 cm  LV FS (2D):   45.9 %   (>25%)  Relative Wall Thickness  0.86    (<0.42)    SPECTRAL DOPPLER ANALYSIS:  LV DIASTOLIC FUNCTION:  MV Peak E: 0.96 m/s Decel Time: 208 msec  MV Peak A: 1.41 m/s  E/A Ratio: 0.68    Aortic Valve:  AoV VMax:    5.49 m/s   AoV Area, Vmax:    0.74 cm² Vmax Indx:    0.40   cm²/m²  AoV VTI:     1.14 m     AoV Area, VTI:     0.63 cm² VTI Indx:     0.34   cm²/m²  AoV Pk Grad: 120.6 mmHg AoV Area, Mn Grad: 0.67 cm² Mn Grad Indx: 0.36   cm²/m²  AoV Mn Grad: 74.0 mmHg    LVOT Vmax: 1.29 m/s  LVOT VTI:  0.23 m  LVOT Diam: 2.00 cm    Mitral Valve:  MV VMax:    1.44 m/s MV P1/2 Time: 60.32 msec  MV Mn Grad: 4.0 mmHg MV Area, PHT: 3.65 cm²    Tricuspid Valve and PA/RV Systolic Pressure: TR Max Velocity: 4.27 m/s RA   Pressure: 8 mmHg RVSP/PASP: 80.9 mmHg    Pulmonic Valve:  PV Max Velocity: 1.54 m/s PV Max P.5 mmHg PV Mean P Marcelo Quan, Electronically signed on 2023 at 10:18:37 AM      ACC: [id number] EXAM:  XR CHEST PORTABLE IMMED 1V   ORDERED BY: CAMERON VARGAS     PROCEDURE DATE:  2023      INTERPRETATION:  Clinical History / Reason for exam: CHF    Comparison : Chest radiograph 2023.    Technique/Positioning: Frontal.    Findings:    Support devices: None.    Cardiac/mediastinum/hilum: Cardiomegaly.    Lung parenchyma/Pleura: Bilateral opacities.    Skeleton/soft tissues: Stable bony structures. Decreased right axillary   soft tissue air.    Impression:    Bilateral opacities and cardiomegaly, unchanged.      EKG  Ventricular Rate 80 BPM    Atrial Rate 80 BPM    P-R Interval 162 ms    QRS Duration 156 ms    Q-T Interval 518 ms    QTC Calculation(Bazett) 597 ms    P Axis 60 degrees    R Axis -72 degrees    T Axis 46 degrees    Diagnosis Line Sinus rhythm withPremature atrial complexes  Right bundle branch block  Left anterior fascicular block  *** Bifascicular block ***  Moderate voltage criteria for LVH, may be normal variant  Abnormal ECG      Recommendation: (Procedures/Evaluations)  CT HEAD Nonn-Contrast:[  ]  CT Chest with /without contrast [ x]  Carotid Duplex :[x ]  ALMAS/PVR: [ ]  PFT : Simple PFT [x ]  Full [ ]  Renal Consult [ ]  Pulmonary Consult: [ ]   Vascular Consult [ ]    Dental Consult [ ]   Hem-Onc Consult [ ]   GI Consult [ ]   Other Consultations :    STS Score: Risk of Mortality:  4.030%  Renal Failure:  5.490%  Permanent Stroke:  1.521%  Prolonged Ventilation:  12.520%  DSW Infection:  0.207%  Reoperation:  4.186%  Morbidity or Mortality:  19.851%  Short Length of Stay:  16.815%  Long Length of Stay:  13.143%    CAD [ ]  Valvular  disease [x ]   Aortic Disease [ ]   JARON: Yes[ ] No [ ]   CKD Stage I [ ] , Stage II [ ] , Stage III [ ], Stage IV [ ]   Anemia: Yes [x ], No [ ]  Diabetes :Yes [ ], No [ ]  Acute MI: Yes [ x], No [ ]   Heart Failure: Yes [ x] , No [ ] HFpEF [ ], HFrEF [ ]        Assessment/ Plan: Patient is a 78 yo female with critical aortic stenosis, severe pulmonary hypertension, and anemia of unknown etiology who is refusing GI workup and is undecided about TAVR  cont present tx as per cardiology  ct surgeon note to follow  pt will need anemia workup and also malignancy workup due to significant weight loss-- pt cannot fully quantify how much weight she has lost  if pt is agreeable she will need pre-op workup consisting of cardiac cath, and tavr protocol ct scans, dental, carotid duplex, and pft's  we will cont to follow her and present her at multi-disciplinary rounds  cont abx for uti

## 2023-04-23 LAB
-  AMIKACIN: SIGNIFICANT CHANGE UP
-  AMOXICILLIN/CLAVULANIC ACID: SIGNIFICANT CHANGE UP
-  AMPICILLIN/SULBACTAM: SIGNIFICANT CHANGE UP
-  AMPICILLIN: SIGNIFICANT CHANGE UP
-  AZTREONAM: SIGNIFICANT CHANGE UP
-  CEFAZOLIN: SIGNIFICANT CHANGE UP
-  CEFEPIME: SIGNIFICANT CHANGE UP
-  CEFOXITIN: SIGNIFICANT CHANGE UP
-  CEFTRIAXONE: SIGNIFICANT CHANGE UP
-  CEFUROXIME: SIGNIFICANT CHANGE UP
-  CIPROFLOXACIN: SIGNIFICANT CHANGE UP
-  ERTAPENEM: SIGNIFICANT CHANGE UP
-  GENTAMICIN: SIGNIFICANT CHANGE UP
-  IMIPENEM: SIGNIFICANT CHANGE UP
-  LEVOFLOXACIN: SIGNIFICANT CHANGE UP
-  MEROPENEM: SIGNIFICANT CHANGE UP
-  NITROFURANTOIN: SIGNIFICANT CHANGE UP
-  PIPERACILLIN/TAZOBACTAM: SIGNIFICANT CHANGE UP
-  TOBRAMYCIN: SIGNIFICANT CHANGE UP
-  TRIMETHOPRIM/SULFAMETHOXAZOLE: SIGNIFICANT CHANGE UP
ALBUMIN SERPL ELPH-MCNC: 2.3 G/DL — LOW (ref 3.5–5.2)
ALP SERPL-CCNC: 72 U/L — SIGNIFICANT CHANGE UP (ref 30–115)
ALT FLD-CCNC: 13 U/L — SIGNIFICANT CHANGE UP (ref 0–41)
ANION GAP SERPL CALC-SCNC: 10 MMOL/L — SIGNIFICANT CHANGE UP (ref 7–14)
AST SERPL-CCNC: 25 U/L — SIGNIFICANT CHANGE UP (ref 0–41)
BASOPHILS # BLD AUTO: 0.03 K/UL — SIGNIFICANT CHANGE UP (ref 0–0.2)
BASOPHILS NFR BLD AUTO: 0.4 % — SIGNIFICANT CHANGE UP (ref 0–1)
BILIRUB SERPL-MCNC: 0.3 MG/DL — SIGNIFICANT CHANGE UP (ref 0.2–1.2)
BUN SERPL-MCNC: 16 MG/DL — SIGNIFICANT CHANGE UP (ref 10–20)
CALCIUM SERPL-MCNC: 7.6 MG/DL — LOW (ref 8.4–10.4)
CHLORIDE SERPL-SCNC: 105 MMOL/L — SIGNIFICANT CHANGE UP (ref 98–110)
CO2 SERPL-SCNC: 27 MMOL/L — SIGNIFICANT CHANGE UP (ref 17–32)
CREAT SERPL-MCNC: 0.9 MG/DL — SIGNIFICANT CHANGE UP (ref 0.7–1.5)
EGFR: 65 ML/MIN/1.73M2 — SIGNIFICANT CHANGE UP
EOSINOPHIL # BLD AUTO: 0.33 K/UL — SIGNIFICANT CHANGE UP (ref 0–0.7)
EOSINOPHIL NFR BLD AUTO: 4.7 % — SIGNIFICANT CHANGE UP (ref 0–8)
GLUCOSE SERPL-MCNC: 120 MG/DL — HIGH (ref 70–99)
HCT VFR BLD CALC: 26.5 % — LOW (ref 37–47)
HGB BLD-MCNC: 7.9 G/DL — LOW (ref 12–16)
IMM GRANULOCYTES NFR BLD AUTO: 0.8 % — HIGH (ref 0.1–0.3)
LYMPHOCYTES # BLD AUTO: 1.41 K/UL — SIGNIFICANT CHANGE UP (ref 1.2–3.4)
LYMPHOCYTES # BLD AUTO: 19.9 % — LOW (ref 20.5–51.1)
MAGNESIUM SERPL-MCNC: 1.7 MG/DL — LOW (ref 1.8–2.4)
MCHC RBC-ENTMCNC: 23.3 PG — LOW (ref 27–31)
MCHC RBC-ENTMCNC: 29.8 G/DL — LOW (ref 32–37)
MCV RBC AUTO: 78.2 FL — LOW (ref 81–99)
METHOD TYPE: SIGNIFICANT CHANGE UP
MONOCYTES # BLD AUTO: 0.81 K/UL — HIGH (ref 0.1–0.6)
MONOCYTES NFR BLD AUTO: 11.4 % — HIGH (ref 1.7–9.3)
NEUTROPHILS # BLD AUTO: 4.45 K/UL — SIGNIFICANT CHANGE UP (ref 1.4–6.5)
NEUTROPHILS NFR BLD AUTO: 62.8 % — SIGNIFICANT CHANGE UP (ref 42.2–75.2)
NRBC # BLD: 0 /100 WBCS — SIGNIFICANT CHANGE UP (ref 0–0)
PLATELET # BLD AUTO: 386 K/UL — SIGNIFICANT CHANGE UP (ref 130–400)
PMV BLD: 9.4 FL — SIGNIFICANT CHANGE UP (ref 7.4–10.4)
POTASSIUM SERPL-MCNC: 3.8 MMOL/L — SIGNIFICANT CHANGE UP (ref 3.5–5)
POTASSIUM SERPL-SCNC: 3.8 MMOL/L — SIGNIFICANT CHANGE UP (ref 3.5–5)
PROT SERPL-MCNC: 5.3 G/DL — LOW (ref 6–8)
RBC # BLD: 3.39 M/UL — LOW (ref 4.2–5.4)
RBC # FLD: 19.3 % — HIGH (ref 11.5–14.5)
SODIUM SERPL-SCNC: 142 MMOL/L — SIGNIFICANT CHANGE UP (ref 135–146)
TRANSFERRIN SERPL-MCNC: 192 MG/DL — LOW (ref 200–360)
TROPONIN T SERPL-MCNC: 0.41 NG/ML — CRITICAL HIGH
WBC # BLD: 7.09 K/UL — SIGNIFICANT CHANGE UP (ref 4.8–10.8)
WBC # FLD AUTO: 7.09 K/UL — SIGNIFICANT CHANGE UP (ref 4.8–10.8)

## 2023-04-23 PROCEDURE — 99233 SBSQ HOSP IP/OBS HIGH 50: CPT

## 2023-04-23 PROCEDURE — 71045 X-RAY EXAM CHEST 1 VIEW: CPT | Mod: 26

## 2023-04-23 PROCEDURE — 99221 1ST HOSP IP/OBS SF/LOW 40: CPT

## 2023-04-23 RX ORDER — MAGNESIUM SULFATE 500 MG/ML
2 VIAL (ML) INJECTION ONCE
Refills: 0 | Status: COMPLETED | OUTPATIENT
Start: 2023-04-23 | End: 2023-04-23

## 2023-04-23 RX ADMIN — Medication 81 MILLIGRAM(S): at 11:45

## 2023-04-23 RX ADMIN — Medication 12.5 MILLIGRAM(S): at 17:36

## 2023-04-23 RX ADMIN — Medication 12.5 MILLIGRAM(S): at 05:01

## 2023-04-23 RX ADMIN — Medication 25 GRAM(S): at 09:43

## 2023-04-23 RX ADMIN — PANTOPRAZOLE SODIUM 40 MILLIGRAM(S): 20 TABLET, DELAYED RELEASE ORAL at 17:37

## 2023-04-23 RX ADMIN — CEFTRIAXONE 100 MILLIGRAM(S): 500 INJECTION, POWDER, FOR SOLUTION INTRAMUSCULAR; INTRAVENOUS at 17:38

## 2023-04-23 RX ADMIN — PANTOPRAZOLE SODIUM 40 MILLIGRAM(S): 20 TABLET, DELAYED RELEASE ORAL at 05:01

## 2023-04-23 RX ADMIN — ATORVASTATIN CALCIUM 40 MILLIGRAM(S): 80 TABLET, FILM COATED ORAL at 21:30

## 2023-04-23 RX ADMIN — Medication 40 MILLIGRAM(S): at 05:02

## 2023-04-23 NOTE — DIETITIAN INITIAL EVALUATION ADULT - ORAL INTAKE PTA/DIET HISTORY
The patient reports following a regular diet at home; consumed three meals at <75%; took a centrum multivitamin once daily.

## 2023-04-23 NOTE — DIETITIAN INITIAL EVALUATION ADULT - NAME AND PHONE
Intervention: 1.Meals and Snacks 2.Medical Food Supplement 3.Vitamin Supplement  Monitor/Evaluate: Diet order, energy intake, nutrition focused physical findings, Mg and anemia profile

## 2023-04-23 NOTE — PROGRESS NOTE ADULT - ASSESSMENT
CHF  critical  AS  elevated trops anemia      adm for fall    cont  diuresis   anemia   w/u   will need   cardiac cath  and AVR

## 2023-04-23 NOTE — CHART NOTE - NSCHARTNOTEFT_GEN_A_CORE
Transfer Note    Transfer from: CCU  Transfer to: /Tele  Approved by Dr. Campbell    [facility] COURSE:    77 y.o Female with PMHx of HTN, Aortic stenosis, atrial tachycardia, chronic venous stasis and OA BIBEMS for chief complaint of fall. She was sent in by her doctor for evaluation of her leg swelling. She fell while sliding to the side of her couch and landed on her buttock. She states she has been experiencing difficulty walking because of her legs being swollen and she feels a burning pain in her left ankle. Her legs are seeping fluid, and has bilateral ulcers. Denies any pain, head injury or being on any blood thinners. Pt denies any urinary or bowel incontinence , back or neck pain, numbness, tingling or focal weakness.    In the ED VS stable, was initially on RA  trops 0.06 ===>0.22   lact 1.1   Hb 7.4   BNP 72068  UA: large LE    many bacteria    nitrite +ve   320 wbc     bl duplex NG  Chest X-Ray: perihilar opacities  EKG wnl    Bl LE swelling likely secondary to diastolic HF due to critical AS  TTE here showed:  Hyperdynamic global LV systolic function        EF of 75 %        Severe biatrial dilation        Severe 'critical' aortic valve stenosis due to fibrocalcific AV. Vmax 5.49 m/s, MG 74 mmHg, and HARRIET 0.63cm^2     severe pHTN: pulmonary artery systolic pressure is 80.9 mmHg   - started her on     metoprolol 12.5     Lasix 40 IV qd      atorvastatin 40qd     aspirin 81qd  - seen by Dr Walker: cardiac cath + TAVR when stable    In the ED pt started desatting to 91 and her BP has been soft (90s/50s)  SIRS negative   Hypotension likely due to critical aortic stenosis  Chest X-Ray appears comparatively worse when compared to one at admission  spoke to cardiac fellow and agreed that pt needs CCU level of care    CCU Course:  Patient has remained HD stable while awaiting further workup. Currently being followed by Cards, CT surgery. Will need LHC, anemia w/o prior to surgical intervention. Patient is currently AAOx3, in NAD, HDS. She is approved for discharge to [facility]/Tele.    Vital Signs Last 24 Hrs  T(C): 37.1 (23 Apr 2023 07:00), Max: 37.5 (22 Apr 2023 16:00)  T(F): 98.8 (23 Apr 2023 07:00), Max: 99.5 (22 Apr 2023 16:00)  HR: 73 (23 Apr 2023 10:00) (66 - 80)  BP: 100/55 (23 Apr 2023 08:00) (73/42 - 107/52)  BP(mean): 72 (23 Apr 2023 08:00) (53 - 77)  RR: 21 (23 Apr 2023 10:00) (17 - 33)  SpO2: 96% (23 Apr 2023 10:00) (90% - 100%)    Parameters below as of 23 Apr 2023 10:00  Patient On (Oxygen Delivery Method): nasal cannula  O2 Flow (L/min): 2    I&O's Summary    22 Apr 2023 07:01  -  23 Apr 2023 07:00  --------------------------------------------------------  IN: 850 mL / OUT: 2900 mL / NET: -2050 mL    23 Apr 2023 07:01  -  23 Apr 2023 10:13  --------------------------------------------------------  IN: 340 mL / OUT: 0 mL / NET: 340 mL        PHYSICAL EXAM:  GEN:  nad.   HEENT:  eomi. ncat  PULM:  b/l lung sounds   CARD: s1, s2  ABD: +bs. ntnd  EXT:  no new rashes.    NEURO:  no new focal deficits.       MEDICATIONS  (STANDING):  aspirin  chewable 81 milliGRAM(s) Oral daily  atorvastatin 40 milliGRAM(s) Oral at bedtime  cefTRIAXone   IVPB 1000 milliGRAM(s) IV Intermittent every 24 hours  enoxaparin Injectable 40 milliGRAM(s) SubCutaneous every 24 hours  furosemide   Injectable 40 milliGRAM(s) IV Push daily  iron sucrose IVPB 200 milliGRAM(s) IV Intermittent every 24 hours  metoprolol tartrate 12.5 milliGRAM(s) Oral two times a day  pantoprazole    Tablet 40 milliGRAM(s) Oral two times a day    MEDICATIONS  (PRN):        LABS                                            7.9                   Neurophils% (auto):   62.8   (04-23 @ 05:08):    7.09 )-----------(386          Lymphocytes% (auto):  19.9                                          26.5                   Eosinphils% (auto):   4.7      Manual%: Neutrophils x    ; Lymphocytes x    ; Eosinophils x    ; Bands%: x    ; Blasts x                                    142    |  105    |  16                  Calcium: 7.6   / iCa: x      (04-23 @ 05:08)    ----------------------------<  120       Magnesium: 1.7                              3.8     |  27     |  0.9              Phosphorous: x        TPro  5.3    /  Alb  2.3    /  TBili  0.3    /  DBili  x      /  AST  25     /  ALT  13     /  AlkPhos  72     23 Apr 2023 05:08        ASSESSMENT & PLAN:     77 y.o Female with PMHx of HTN, Severe Aortic stenosis, atrial tachycardia, chronic venous stasis and OA BIBEMS for chief complaint of fall.    # Chronic venous stasis   - Bilateral LE edema  - Takes Furosemide and metolazone   - c/w Furosemide IV 40mg qd  - VAscular onboard >> No acute intervention   - Elevate Legs and ACE wrap them   - PT/OT    # Acute Microcytic anemia   - Unknown baseline   - s/p 1 unit PRBC, H/Hl stable around ~ 8  - GI evaluation appreciated, pt refusing EGD/C-scope for now  - c/w PPI    # Asymptomatic bacteriuria   - U/A Grossly positive. No sympt   - f/u Ucx   - monitor off abx       # Elevated Troponins   - Asymptomatic, no chest pain  - Will trend  - ECG: no ischemic changes   - c/w aspirin, statin and metoprolol  - will need LHC prior to AVR once anemia w/up completed, f/u with Dr Walker    # Severe AS   # Hx of Atrial tachycardias   # HTN  - CT surgery evaluation appreciated. Recommend anemia w/up prior to AVR    # OA  - stable    #DVT - lovenox sq   #GI - Not indicated   #Diet - DASH  #Activity - IAT   #Code - Full code     Disposition - Tele      For Follow-Up:  - anemia workup  - Cards for LHC  - CT Surgery f/u Transfer Note:    Transfer from: CCU  Transfer to: /Tele  Approved by Dr. Campbell    [facility] COURSE:    77 y.o Female with PMHx of HTN, Aortic stenosis, atrial tachycardia, chronic venous stasis and OA BIBEMS for chief complaint of fall. She was sent in by her doctor for evaluation of her leg swelling. She fell while sliding to the side of her couch and landed on her buttock. She states she has been experiencing difficulty walking because of her legs being swollen and she feels a burning pain in her left ankle. Her legs are seeping fluid, and has bilateral ulcers. Denies any pain, head injury or being on any blood thinners. Pt denies any urinary or bowel incontinence , back or neck pain, numbness, tingling or focal weakness.    In the ED VS stable, was initially on RA  trops 0.06 ===>0.22   lact 1.1   Hb 7.4   BNP 87523  UA: large LE    many bacteria    nitrite +ve   320 wbc     bl duplex NG  Chest X-Ray: perihilar opacities  EKG wnl    Bl LE swelling likely secondary to diastolic HF due to critical AS  TTE here showed:  Hyperdynamic global LV systolic function        EF of 75 %        Severe biatrial dilation        Severe 'critical' aortic valve stenosis due to fibrocalcific AV. Vmax 5.49 m/s, MG 74 mmHg, and HARRIET 0.63cm^2     severe pHTN: pulmonary artery systolic pressure is 80.9 mmHg   - started her on     metoprolol 12.5     Lasix 40 IV qd      atorvastatin 40qd     aspirin 81qd  - seen by Dr Walker: cardiac cath + TAVR when stable    In the ED pt started desatting to 91 and her BP has been soft (90s/50s)  SIRS negative   Hypotension likely due to critical aortic stenosis  Chest X-Ray appears comparatively worse when compared to one at admission  spoke to cardiac fellow and agreed that pt needs CCU level of care    CCU Course:  Patient has remained HD stable while awaiting further workup. Currently being followed by Cards, CT surgery. Will need LHC, anemia w/o prior to surgical intervention. Patient is currently AAOx3, in NAD, HDS. She is approved for discharge to [facility]/Tele.    Vital Signs Last 24 Hrs  T(C): 37.1 (23 Apr 2023 07:00), Max: 37.5 (22 Apr 2023 16:00)  T(F): 98.8 (23 Apr 2023 07:00), Max: 99.5 (22 Apr 2023 16:00)  HR: 73 (23 Apr 2023 10:00) (66 - 80)  BP: 100/55 (23 Apr 2023 08:00) (73/42 - 107/52)  BP(mean): 72 (23 Apr 2023 08:00) (53 - 77)  RR: 21 (23 Apr 2023 10:00) (17 - 33)  SpO2: 96% (23 Apr 2023 10:00) (90% - 100%)    Parameters below as of 23 Apr 2023 10:00  Patient On (Oxygen Delivery Method): nasal cannula  O2 Flow (L/min): 2    I&O's Summary    22 Apr 2023 07:01  -  23 Apr 2023 07:00  --------------------------------------------------------  IN: 850 mL / OUT: 2900 mL / NET: -2050 mL    23 Apr 2023 07:01  -  23 Apr 2023 10:13  --------------------------------------------------------  IN: 340 mL / OUT: 0 mL / NET: 340 mL        PHYSICAL EXAM:  GEN:  nad.   HEENT:  eomi. ncat  PULM:  b/l lung sounds   CARD: s1, s2  ABD: +bs. ntnd  EXT:  no new rashes.    NEURO:  no new focal deficits.       MEDICATIONS  (STANDING):  aspirin  chewable 81 milliGRAM(s) Oral daily  atorvastatin 40 milliGRAM(s) Oral at bedtime  cefTRIAXone   IVPB 1000 milliGRAM(s) IV Intermittent every 24 hours  enoxaparin Injectable 40 milliGRAM(s) SubCutaneous every 24 hours  furosemide   Injectable 40 milliGRAM(s) IV Push daily  iron sucrose IVPB 200 milliGRAM(s) IV Intermittent every 24 hours  metoprolol tartrate 12.5 milliGRAM(s) Oral two times a day  pantoprazole    Tablet 40 milliGRAM(s) Oral two times a day    MEDICATIONS  (PRN):        LABS                                            7.9                   Neurophils% (auto):   62.8   (04-23 @ 05:08):    7.09 )-----------(386          Lymphocytes% (auto):  19.9                                          26.5                   Eosinphils% (auto):   4.7      Manual%: Neutrophils x    ; Lymphocytes x    ; Eosinophils x    ; Bands%: x    ; Blasts x                                    142    |  105    |  16                  Calcium: 7.6   / iCa: x      (04-23 @ 05:08)    ----------------------------<  120       Magnesium: 1.7                              3.8     |  27     |  0.9              Phosphorous: x        TPro  5.3    /  Alb  2.3    /  TBili  0.3    /  DBili  x      /  AST  25     /  ALT  13     /  AlkPhos  72     23 Apr 2023 05:08        ASSESSMENT & PLAN:     77 y.o Female with PMHx of HTN, Severe Aortic stenosis, atrial tachycardia, chronic venous stasis and OA BIBEMS for chief complaint of fall.    # Chronic venous stasis   - Bilateral LE edema  - Takes Furosemide and metolazone   - c/w Furosemide IV 40mg qd  - VAscular onboard >> No acute intervention   - Elevate Legs and ACE wrap them   - PT/OT    # Acute Microcytic anemia   - Unknown baseline   - s/p 1 unit PRBC, H/Hl stable around ~ 8  - GI evaluation appreciated, pt refusing EGD/C-scope for now  - c/w PPI    # Asymptomatic bacteriuria   - U/A Grossly positive. No sympt   - f/u Ucx   - monitor off abx       # Elevated Troponins   - Asymptomatic, no chest pain  - Will trend  - ECG: no ischemic changes   - c/w aspirin, statin and metoprolol  - will need LHC prior to AVR once anemia w/up completed, f/u with Dr Walker    # Severe AS   # Hx of Atrial tachycardias   # HTN  - CT surgery evaluation appreciated. Recommend anemia w/up prior to AVR    # OA  - stable    #DVT - lovenox sq   #GI - Not indicated   #Diet - DASH  #Activity - IAT   #Code - Full code     Disposition - Tele      For Follow-Up:  - anemia workup  - Cards for LHC  - CT Surgery f/u

## 2023-04-23 NOTE — DIETITIAN INITIAL EVALUATION ADULT - NSFNSGIIOFT_GEN_A_CORE
Dx: 78y/o female with h/o HTN, severe aortic stenosis, atrial tachycardia, chronic venous stasis and OA, BIBEMS for chief complaint of fall. Hospital course is complicated by bilateral lower extremity edema, acute microcytic anemia, elevated troponin and severe AS.

## 2023-04-23 NOTE — DIETITIAN INITIAL EVALUATION ADULT - NSICDXPASTMEDICALHX_GEN_ALL_CORE_FT
PAST MEDICAL HISTORY:  Aortic stenosis     H/O atrial tachycardia     Hypertension     Osteoarthritis

## 2023-04-23 NOTE — DIETITIAN INITIAL EVALUATION ADULT - PERTINENT LABORATORY DATA
04-23    142  |  105  |  16  ----------------------------<  120<H>  3.8   |  27  |  0.9    Ca    7.6<L>      23 Apr 2023 05:08  Mg     1.7     04-23    TPro  5.3<L>  /  Alb  2.3<L>  /  TBili  0.3  /  DBili  x   /  AST  25  /  ALT  13  /  AlkPhos  72  04-23

## 2023-04-23 NOTE — PROGRESS NOTE ADULT - ASSESSMENT
77 y.o Female with PMHx of HTN, Severe Aortic stenosis, atrial tachycardia, chronic venous stasis and OA BIBEMS for chief complaint of fall.    # Chronic venous stasis   - Bilateral LE edema  - Takes Furosemide and metolazone   - c/w Furosemide IV 40mg qd  - VAscular onboard >> No acute intervention   - Elevate Legs and ACE wrap them   - PT/OT    # Acute Microcytic anemia   - Unknown baseline   - s/p 1 unit PRBC, H/Hl stable around ~ 8  - GI evaluation appreciated, pt refusing EGD/C-scope for now  - c/w PPI    # Asymptomatic bacteriuria   - U/A Grossly positive. No sympt   - f/u Ucx   - monitor off abx       # Elevated Troponins   - Asymptomatic, no chest pain  - Will trend  - ECG: no ischemic changes   - c/w aspirin, statin and metoprolol  - will need LHC prior to AVR once anemia w/up completed, f/u with Dr Walker    # Severe AS   # Hx of Atrial tachycardias   # HTN  - CT surgery evaluation appreicated. Recommend anemia w/up prior to AVR    # OA  - stable    #DVT - lovenox sq   #GI - Not indicated   #Diet - DASH  #Activity - IAT   #Code - Full code     Disposition - CCU     77 y.o Female with PMHx of HTN, Severe Aortic stenosis, atrial tachycardia, chronic venous stasis and OA BIBEMS for chief complaint of fall.    # Chronic venous stasis   - Bilateral LE edema  - Takes Furosemide and metolazone   - c/w Furosemide IV 40mg qd  - VAscular onboard >> No acute intervention   - Elevate Legs and ACE wrap them   - PT/OT    # Acute Microcytic anemia   - Unknown baseline   - s/p 1 unit PRBC, H/Hl stable around ~ 8  - GI evaluation appreciated, pt refusing EGD/C-scope for now  - c/w PPI    # Asymptomatic bacteriuria   - U/A Grossly positive. No sympt   - f/u Ucx   - monitor off abx       # Elevated Troponins   - Asymptomatic, no chest pain  - Will trend  - ECG: no ischemic changes   - c/w aspirin, statin and metoprolol  - will need LHC prior to AVR once anemia w/up completed, f/u with Dr Walker    # Severe AS   # Hx of Atrial tachycardias   # HTN  - CT surgery evaluation appreciated Recommend anemia w/up prior to AVR    # OA  - stable    #DVT - lovenox sq   #GI - Not indicated   #Diet - DASH  #Activity - IAT   #Code - Full code     Disposition - CCU - transfer to telemetry if stable.

## 2023-04-23 NOTE — DIETITIAN INITIAL EVALUATION ADULT - OTHER CALCULATIONS
Estimated Calorie Needs: [id number] x AF 1.1-1.7=2689-9233ygld/day -Due to obesity  Estimated Protein Needs: 74-89grams/day (1.25-1.5grams/kg of IBW-59kg) -Due to obesity, age and PI  Estimated Fluid Needs: 1553-1694mL/day (1mL/kcal)

## 2023-04-23 NOTE — DIETITIAN INITIAL EVALUATION ADULT - PERTINENT MEDS FT
MEDICATIONS  (STANDING):  aspirin  chewable 81 milliGRAM(s) Oral daily  atorvastatin 40 milliGRAM(s) Oral at bedtime  enoxaparin Injectable 40 milliGRAM(s) SubCutaneous every 24 hours  furosemide   Injectable 40 milliGRAM(s) IV Push daily  iron sucrose IVPB 200 milliGRAM(s) IV Intermittent every 24 hours  metoprolol tartrate 12.5 milliGRAM(s) Oral two times a day  pantoprazole    Tablet 40 milliGRAM(s) Oral two times a day    MEDICATIONS  (PRN):

## 2023-04-23 NOTE — PROGRESS NOTE ADULT - SUBJECTIVE AND OBJECTIVE BOX
INTERVAL HISTORY: No overnight events.  	  MEDICATIONS:  aspirin  chewable 81 milliGRAM(s) Oral daily  atorvastatin 40 milliGRAM(s) Oral at bedtime  cefTRIAXone   IVPB 1000 milliGRAM(s) IV Intermittent every 24 hours  enoxaparin Injectable 40 milliGRAM(s) SubCutaneous every 24 hours  furosemide   Injectable 40 milliGRAM(s) IV Push daily  iron sucrose IVPB 200 milliGRAM(s) IV Intermittent every 24 hours  metoprolol tartrate 12.5 milliGRAM(s) Oral two times a day  pantoprazole    Tablet 40 milliGRAM(s) Oral two times a day      REVIEW OF SYSTEMS:  CONSTITUTIONAL: No fever, weight loss, or fatigue  NECK: No pain or stiffness  RESPIRATORY: No cough, wheezing, chills or hemoptysis; No shortness of breath  CARDIOVASCULAR: No chest pain, palpitations, dizziness, or leg swelling  GASTROINTESTINAL: No abdominal or epigastric pain. No nausea, vomiting, or hematemesis; No diarrhea or constipation. No melena or hematochezia.  GENITOURINARY: No dysuria, frequency, hematuria, or incontinence  NEUROLOGICAL: No headaches, memory loss, loss of strength, numbness, or tremors  SKIN: No itching, burning, rashes, or lesions   ENDOCRINE: No heat or cold intolerance; No hair loss  MUSCULOSKELETAL: No joint pain or swelling; No muscle, back, or extremity pain  HEME/LYMPH: No easy bruising, or bleeding gums    PHYSICAL EXAM:  T(C): 37.1 (04-23-23 @ 07:00), Max: 37.5 (04-22-23 @ 16:00)  HR: 70 (04-23-23 @ 07:00) (66 - 85)  BP: 107/52 (04-23-23 @ 06:00) (73/42 - 115/69)  RR: 18 (04-23-23 @ 07:00) (17 - 33)  SpO2: 96% (04-23-23 @ 07:00) (90% - 100%)  Wt(kg): --  I&O's Summary    22 Apr 2023 07:01  -  23 Apr 2023 07:00  --------------------------------------------------------  IN: 850 mL / OUT: 2900 mL / NET: -2050 mL      Height (cm): 167.6 (04-22 @ 13:48)  Weight (kg): 90.7 (04-22 @ 13:48)  BMI (kg/m2): 32.3 (04-22 @ 13:48)  BSA (m2): 2 (04-22 @ 13:48)    GENERAL: NAD, well-groomed, well-developed  HEAD:  Atraumatic, Normocephalic  NECK: Supple, No JVD, Normal thyroid  NERVOUS SYSTEM:  Alert & Oriented X3, Good concentration  CHEST/LUNG: Clear to percussion bilaterally; No rales, rhonchi, wheezing, or rubs  HEART: Regular rate and rhythm; No murmurs, rubs, or gallops  ABDOMEN: Soft, Nontender, Nondistended; Bowel sounds present  EXTREMITIES:  2+ Peripheral Pulses, No clubbing, cyanosis, or edema  SKIN: No rashes or lesions    LABS:	 	    CARDIAC MARKERS ( 23 Apr 2023 05:08 )  x     / 0.41 ng/mL / x     / x     / x      CARDIAC MARKERS ( 21 Apr 2023 17:05 )  x     / 0.22 ng/mL / x     / x     / x      CARDIAC MARKERS ( 21 Apr 2023 11:22 )  x     / 0.22 ng/mL / x     / x     / x                                7.9    7.09  )-----------( 386      ( 23 Apr 2023 05:08 )             26.5     04-23    142  |  105  |  16  ----------------------------<  120<H>  3.8   |  27  |  0.9    Ca    7.6<L>      23 Apr 2023 05:08  Mg     1.7     04-23    TPro  5.3<L>  /  Alb  2.3<L>  /  TBili  0.3  /  DBili  x   /  AST  25  /  ALT  13  /  AlkPhos  72  04-23    proBNP:   Lipid Profile:

## 2023-04-23 NOTE — PROGRESS NOTE ADULT - SUBJECTIVE AND OBJECTIVE BOX
CHIEF COMPLAINT:  Patient is a 77y old  Female who presents with a chief complaint of Fall (23 Apr 2023 08:32)      INTERVAL HISTORY/OVERNIGHT EVENTS:  no major events. S/P 1 unit PRBC yesterday. HD stable.     ======================  MEDICATIONS:  aspirin  chewable 81 milliGRAM(s) Oral daily  atorvastatin 40 milliGRAM(s) Oral at bedtime  cefTRIAXone   IVPB 1000 milliGRAM(s) IV Intermittent every 24 hours  enoxaparin Injectable 40 milliGRAM(s) SubCutaneous every 24 hours  furosemide   Injectable 40 milliGRAM(s) IV Push daily  iron sucrose IVPB 200 milliGRAM(s) IV Intermittent every 24 hours  metoprolol tartrate 12.5 milliGRAM(s) Oral two times a day  pantoprazole    Tablet 40 milliGRAM(s) Oral two times a day      ======================  PHYSICAL EXAMINATION:  GEN:  nad.   HEENT:  eomi. ncat  PULM:  b/l lung sounds   CARD: s1, s2  ABD: +bs. ntnd  EXT:  no new rashes.    NEURO:  no new focal deficits.   ======================  OBJECTIVE:        VS:  T(F): 98.8 (04-23 @ 07:00), Max: 99.5 (04-22 @ 16:00)  HR: 73 (04-23 @ 10:00) (66 - 80)  BP: 100/55 (04-23 @ 08:00) (73/42 - 107/52)  RR: 21 (04-23 @ 10:00) (17 - 33)  SpO2: 96% (04-23 @ 10:00) (90% - 100%)    I/O:      04-21 @ 07:01  -  04-22 @ 07:00  --------------------------------------------------------  IN: 460 mL / OUT: 1500 mL / NET: -1040 mL    04-22 @ 07:01  -  04-23 @ 07:00  --------------------------------------------------------  IN: 850 mL / OUT: 2900 mL / NET: -2050 mL    04-23 @ 07:01  -  04-23 @ 10:02  --------------------------------------------------------  IN: 340 mL / OUT: 0 mL / NET: 340 mL        Weight trend:  Weight (kg): 90.7 (04-22)    ======================    LABS:  ABG - ( 22 Apr 2023 06:10 )  pH, Arterial: 7.45  pH, Blood: x     /  pCO2: 39    /  pO2: 142   / HCO3: 27    / Base Excess: 2.9   /  SaO2: 97.4                                    7.9    7.09  )-----------( 386      ( 23 Apr 2023 05:08 )             26.5     04-23    142  |  105  |  16  ----------------------------<  120<H>  3.8   |  27  |  0.9    Ca    7.6<L>      23 Apr 2023 05:08  Mg     1.7     04-23    TPro  5.3<L>  /  Alb  2.3<L>  /  TBili  0.3  /  DBili  x   /  AST  25  /  ALT  13  /  AlkPhos  72  04-23    LIVER FUNCTIONS - ( 23 Apr 2023 05:08 )  Alb: 2.3 g/dL / Pro: 5.3 g/dL / ALK PHOS: 72 U/L / ALT: 13 U/L / AST: 25 U/L / GGT: x             Troponin T, Serum: 0.41 ng/mL (04-23 @ 05:08)    CARDIAC MARKERS ( 23 Apr 2023 05:08 )  x     / 0.41 ng/mL / x     / x     / x      CARDIAC MARKERS ( 21 Apr 2023 17:05 )  x     / 0.22 ng/mL / x     / x     / x      CARDIAC MARKERS ( 21 Apr 2023 11:22 )  x     / 0.22 ng/mL / x     / x     / x                Cultures:    Culture - Urine (collected 04-20)  Source: Clean Catch Clean Catch (Midstream)  Preliminary Report:    >100,000 CFU/ml Escherichia coli           CHIEF COMPLAINT:    Patient is a 77y old  Female who presents with a chief complaint of Fall (23 Apr 2023 08:32)      INTERVAL HISTORY/OVERNIGHT EVENTS:  no major events. S/P 1 unit PRBC yesterday. HD stable.     ======================  MEDICATIONS:  aspirin  chewable 81 milliGRAM(s) Oral daily  atorvastatin 40 milliGRAM(s) Oral at bedtime  cefTRIAXone   IVPB 1000 milliGRAM(s) IV Intermittent every 24 hours  enoxaparin Injectable 40 milliGRAM(s) SubCutaneous every 24 hours  furosemide   Injectable 40 milliGRAM(s) IV Push daily  iron sucrose IVPB 200 milliGRAM(s) IV Intermittent every 24 hours  metoprolol tartrate 12.5 milliGRAM(s) Oral two times a day  pantoprazole    Tablet 40 milliGRAM(s) Oral two times a day      ======================  PHYSICAL EXAMINATION:  GEN:  nad.   HEENT:  eomi. ncat  PULM:  b/l lung sounds   CARD: s1, s2, 2/6 JESS  ABD: +bs. ntnd  EXT:  no new rashes.    NEURO:  no new focal deficits.   ======================  OBJECTIVE:        VS:  T(F): 98.8 (04-23 @ 07:00), Max: 99.5 (04-22 @ 16:00)  HR: 73 (04-23 @ 10:00) (66 - 80)  BP: 100/55 (04-23 @ 08:00) (73/42 - 107/52)  RR: 21 (04-23 @ 10:00) (17 - 33)  SpO2: 96% (04-23 @ 10:00) (90% - 100%)    I/O:      04-21 @ 07:01  -  04-22 @ 07:00  --------------------------------------------------------  IN: 460 mL / OUT: 1500 mL / NET: -1040 mL    04-22 @ 07:01 - 04-23 @ 07:00  --------------------------------------------------------  IN: 850 mL / OUT: 2900 mL / NET: -2050 mL    04-23 @ 07:01 - 04-23 @ 10:02  --------------------------------------------------------  IN: 340 mL / OUT: 0 mL / NET: 340 mL        Weight trend:  Weight (kg): 90.7 (04-22)    ======================    LABS:  ABG - ( 22 Apr 2023 06:10 )  pH, Arterial: 7.45  pH, Blood: x     /  pCO2: 39    /  pO2: 142   / HCO3: 27    / Base Excess: 2.9   /  SaO2: 97.4                                    7.9    7.09  )-----------( 386      ( 23 Apr 2023 05:08 )             26.5     04-23    142  |  105  |  16  ----------------------------<  120<H>  3.8   |  27  |  0.9    Ca    7.6<L>      23 Apr 2023 05:08  Mg     1.7     04-23    TPro  5.3<L>  /  Alb  2.3<L>  /  TBili  0.3  /  DBili  x   /  AST  25  /  ALT  13  /  AlkPhos  72  04-23    LIVER FUNCTIONS - ( 23 Apr 2023 05:08 )  Alb: 2.3 g/dL / Pro: 5.3 g/dL / ALK PHOS: 72 U/L / ALT: 13 U/L / AST: 25 U/L / GGT: x             Troponin T, Serum: 0.41 ng/mL (04-23 @ 05:08)    CARDIAC MARKERS ( 23 Apr 2023 05:08 )  x     / 0.41 ng/mL / x     / x     / x      CARDIAC MARKERS ( 21 Apr 2023 17:05 )  x     / 0.22 ng/mL / x     / x     / x      CARDIAC MARKERS ( 21 Apr 2023 11:22 )  x     / 0.22 ng/mL / x     / x     / x                Cultures:    Culture - Urine (collected 04-20)  Source: Clean Catch Clean Catch (Midstream)  Preliminary Report:    >100,000 CFU/ml Escherichia coli

## 2023-04-24 LAB
ALBUMIN SERPL ELPH-MCNC: 2.4 G/DL — LOW (ref 3.5–5.2)
ALP SERPL-CCNC: 73 U/L — SIGNIFICANT CHANGE UP (ref 30–115)
ALT FLD-CCNC: 13 U/L — SIGNIFICANT CHANGE UP (ref 0–41)
ANION GAP SERPL CALC-SCNC: 7 MMOL/L — SIGNIFICANT CHANGE UP (ref 7–14)
AST SERPL-CCNC: 25 U/L — SIGNIFICANT CHANGE UP (ref 0–41)
BASOPHILS # BLD AUTO: 0.03 K/UL — SIGNIFICANT CHANGE UP (ref 0–0.2)
BASOPHILS NFR BLD AUTO: 0.5 % — SIGNIFICANT CHANGE UP (ref 0–1)
BILIRUB SERPL-MCNC: 0.3 MG/DL — SIGNIFICANT CHANGE UP (ref 0.2–1.2)
BUN SERPL-MCNC: 17 MG/DL — SIGNIFICANT CHANGE UP (ref 10–20)
CALCIUM SERPL-MCNC: 7.7 MG/DL — LOW (ref 8.4–10.4)
CHLORIDE SERPL-SCNC: 105 MMOL/L — SIGNIFICANT CHANGE UP (ref 98–110)
CK MB CFR SERPL CALC: 1.4 NG/ML — SIGNIFICANT CHANGE UP (ref 0.6–6.3)
CO2 SERPL-SCNC: 29 MMOL/L — SIGNIFICANT CHANGE UP (ref 17–32)
CREAT SERPL-MCNC: 0.8 MG/DL — SIGNIFICANT CHANGE UP (ref 0.7–1.5)
EGFR: 75 ML/MIN/1.73M2 — SIGNIFICANT CHANGE UP
EOSINOPHIL # BLD AUTO: 0.3 K/UL — SIGNIFICANT CHANGE UP (ref 0–0.7)
EOSINOPHIL NFR BLD AUTO: 4.7 % — SIGNIFICANT CHANGE UP (ref 0–8)
FERRITIN SERPL-MCNC: 229 NG/ML — HIGH (ref 15–150)
FOLATE SERPL-MCNC: 9.6 NG/ML — SIGNIFICANT CHANGE UP
GLUCOSE SERPL-MCNC: 123 MG/DL — HIGH (ref 70–99)
HCT VFR BLD CALC: 26.8 % — LOW (ref 37–47)
HGB BLD-MCNC: 7.8 G/DL — LOW (ref 12–16)
IMM GRANULOCYTES NFR BLD AUTO: 0.8 % — HIGH (ref 0.1–0.3)
IRON SATN MFR SERPL: 13 % — LOW (ref 15–50)
IRON SATN MFR SERPL: 30 UG/DL — LOW (ref 35–150)
LYMPHOCYTES # BLD AUTO: 1.09 K/UL — LOW (ref 1.2–3.4)
LYMPHOCYTES # BLD AUTO: 17.2 % — LOW (ref 20.5–51.1)
MAGNESIUM SERPL-MCNC: 2.1 MG/DL — SIGNIFICANT CHANGE UP (ref 1.8–2.4)
MCHC RBC-ENTMCNC: 23.2 PG — LOW (ref 27–31)
MCHC RBC-ENTMCNC: 29.1 G/DL — LOW (ref 32–37)
MCV RBC AUTO: 79.8 FL — LOW (ref 81–99)
MONOCYTES # BLD AUTO: 0.67 K/UL — HIGH (ref 0.1–0.6)
MONOCYTES NFR BLD AUTO: 10.6 % — HIGH (ref 1.7–9.3)
NEUTROPHILS # BLD AUTO: 4.18 K/UL — SIGNIFICANT CHANGE UP (ref 1.4–6.5)
NEUTROPHILS NFR BLD AUTO: 66.2 % — SIGNIFICANT CHANGE UP (ref 42.2–75.2)
NRBC # BLD: 0 /100 WBCS — SIGNIFICANT CHANGE UP (ref 0–0)
PLATELET # BLD AUTO: 382 K/UL — SIGNIFICANT CHANGE UP (ref 130–400)
PMV BLD: 9.7 FL — SIGNIFICANT CHANGE UP (ref 7.4–10.4)
POTASSIUM SERPL-MCNC: 3.9 MMOL/L — SIGNIFICANT CHANGE UP (ref 3.5–5)
POTASSIUM SERPL-SCNC: 3.9 MMOL/L — SIGNIFICANT CHANGE UP (ref 3.5–5)
PROT SERPL-MCNC: 5.4 G/DL — LOW (ref 6–8)
RBC # BLD: 3.36 M/UL — LOW (ref 4.2–5.4)
RBC # FLD: 19.5 % — HIGH (ref 11.5–14.5)
SODIUM SERPL-SCNC: 141 MMOL/L — SIGNIFICANT CHANGE UP (ref 135–146)
TIBC SERPL-MCNC: 237 UG/DL — SIGNIFICANT CHANGE UP (ref 220–430)
TROPONIN T SERPL-MCNC: 0.46 NG/ML — CRITICAL HIGH
UIBC SERPL-MCNC: 207 UG/DL — SIGNIFICANT CHANGE UP (ref 110–370)
VIT B12 SERPL-MCNC: 895 PG/ML — SIGNIFICANT CHANGE UP (ref 232–1245)
WBC # BLD: 6.32 K/UL — SIGNIFICANT CHANGE UP (ref 4.8–10.8)
WBC # FLD AUTO: 6.32 K/UL — SIGNIFICANT CHANGE UP (ref 4.8–10.8)

## 2023-04-24 PROCEDURE — 99233 SBSQ HOSP IP/OBS HIGH 50: CPT

## 2023-04-24 PROCEDURE — 76856 US EXAM PELVIC COMPLETE: CPT | Mod: 26

## 2023-04-24 PROCEDURE — 93880 EXTRACRANIAL BILAT STUDY: CPT | Mod: 26

## 2023-04-24 PROCEDURE — 71045 X-RAY EXAM CHEST 1 VIEW: CPT | Mod: 26

## 2023-04-24 RX ADMIN — ENOXAPARIN SODIUM 40 MILLIGRAM(S): 100 INJECTION SUBCUTANEOUS at 00:17

## 2023-04-24 RX ADMIN — Medication 81 MILLIGRAM(S): at 11:18

## 2023-04-24 RX ADMIN — Medication 40 MILLIGRAM(S): at 05:59

## 2023-04-24 RX ADMIN — ATORVASTATIN CALCIUM 40 MILLIGRAM(S): 80 TABLET, FILM COATED ORAL at 21:23

## 2023-04-24 RX ADMIN — PANTOPRAZOLE SODIUM 40 MILLIGRAM(S): 20 TABLET, DELAYED RELEASE ORAL at 05:59

## 2023-04-24 RX ADMIN — PANTOPRAZOLE SODIUM 40 MILLIGRAM(S): 20 TABLET, DELAYED RELEASE ORAL at 17:09

## 2023-04-24 RX ADMIN — Medication 12.5 MILLIGRAM(S): at 06:00

## 2023-04-24 RX ADMIN — IRON SUCROSE 110 MILLIGRAM(S): 20 INJECTION, SOLUTION INTRAVENOUS at 00:17

## 2023-04-24 RX ADMIN — Medication 12.5 MILLIGRAM(S): at 17:10

## 2023-04-24 NOTE — PROGRESS NOTE ADULT - ASSESSMENT
77 y.o Female with PMHx of HTN, Aortic stenosis, atrial tachycardia, chronic venous stasis and OA BIBEMS for chief complaint of fall. She was sent in by her doctor for evaluation of her leg swelling. She fell while sliding to the side of her couch and landed on her buttock.     Acute HFpEF due to critical AS  Critical AS  S/P Fall.   NSTEMI likely type 2  Vaginal bleeding  Iron deficiency anemia  HTN  Chronic venous stasis.             PLAN:    ·	Cont tele  ·	CE trending up. Repeat Troponin, CK, and CKMB  ·	Cont Lasix 40 mg ivp daily  ·	Check i's and o's and daily wt  ·	Low salt diet and water restriction to 1.5 L/D  ·	ECHO reviewed. EF is 75%. Critical AS. Severe pulmonary HTN. IVC dilated.   ·	Vaginal bleeding; OBGYN eval and transvaginal US  ·	S/P one unit of PRBC'S  ·	Monitor H/H  ·	Start her on IV Venofer 200 mg iv daily x 5 doses.   ·	CTS eval noted. Will need AVR    Progress Note Handoff    Pending (specify):  Consults__OBGYN_______, Tests__Trnasvaginal US______, Test Results_______, Other_________  Family discussion:  Disposition: Home___/SNF___/Other________/Unknown at this time________    Carloz Cheatham MD  Spectra: [phone]

## 2023-04-24 NOTE — PROGRESS NOTE ADULT - ASSESSMENT
77 y.o Female with PMHx of HTN, Severe Aortic stenosis, atrial tachycardia, chronic venous stasis and OA BIBEMS for chief complaint of fall.    # Chronic venous stasis   - Bilateral LE edema  - Takes Furosemide and metolazone   - Cont Furosemide IV 40mg qd  - Vascular and recommended no acute intervention   - Elevate Legs and ACE wrap them     # Acute Microcytic anemia   - s/p 1 unit PRBC, H/Hl stable around ~ 7.5-8  - GI evaluation appreciated, pt refusing EGD/C-scope for now, c/w PPI  -patient has history of bleeding per vagina, on and off for ?1 year, last episode last night  -trend CBC  -GYN consult  -F/U TVUS      # Elevated Troponins   - Asymptomatic, no chest pain  - ECG: no ischemic changes   - c/w aspirin, statin and metoprolol  - will need LHC prior to AVR once anemia w/up completed, f/u with Dr Walker    # Severe AS   - CT surgery followed and recommended CT angio Abdomen and pelvis TAVR NAYELI w/wo IV Cont and CT angio Chest TAVR w/wo IV cont       #MISC  -DVT: lovenox sq   -GI: PPI   -Diet: DASH  -Activity: IAT   -Code status: Full code   -Dispo: F/U CT surgery, Follow up GYN   77 y.o Female with PMHx of HTN, Severe Aortic stenosis, atrial tachycardia, chronic venous stasis and OA BIBEMS for chief complaint of fall.    # Chronic venous stasis   - Bilateral LE edema  - Takes Furosemide and metolazone   - Cont Furosemide IV 40mg qd  - Vascular and recommended no acute intervention   - Elevate Legs and ACE wrap them     # Acute Microcytic anemia   - s/p 1 unit PRBC, H/Hl stable around ~ 7.5-8  - GI evaluation appreciated, pt refusing EGD/C-scope for now, c/w PPI  -patient has history of bleeding per vagina, on and off for ?1 year, last episode last night  -trend CBC  -GYN consult  -F/U TVUS      # Elevated Troponins   - Asymptomatic, no chest pain  - ECG: no ischemic changes   - c/w aspirin, statin and metoprolol  - will need LHC prior to AVR once anemia w/up completed, f/u with Dr Walker    # Severe AS   - CT surgery followed and recommended CT angio Abdomen and pelvis TAVR NAYELI w/wo IV Cont and CT angio Chest TAVR w/wo IV cont   -pt should also have pre-op workup while hospitalized including cardiac cath, but tavr will be performed as an outpatient as per CT sx        #MISC  -DVT: lovenox sq   -GI: PPI   -Diet: DASH  -Activity: IAT   -Code status: Full code   -Dispo: F/U CT surgery, Follow up GYN

## 2023-04-24 NOTE — PROGRESS NOTE ADULT - SUBJECTIVE AND OBJECTIVE BOX
OPERATIVE PROCEDURE(s):      pre-op tavr              SURGEON(s): meena      SUBJECTIVE ASSESSMENT: pt complains of feeling weak and tired    Vital Signs Last 24 Hrs  T(C): 35.9 (24 Apr 2023 13:05), Max: 37.2 (24 Apr 2023 05:00)  T(F): 96.6 (24 Apr 2023 13:05), Max: 99 (24 Apr 2023 05:00)  HR: 71 (24 Apr 2023 13:05) (71 - 77)  BP: 93/53 (24 Apr 2023 13:05) (82/50 - 108/54)  BP(mean): 61 (23 Apr 2023 14:00) (61 - 61)  RR: 18 (24 Apr 2023 13:05) (18 - 28)  SpO2: 99% (23 Apr 2023 22:01) (98% - 99%)    Parameters below as of 23 Apr 2023 22:01  Patient On (Oxygen Delivery Method): nasal cannula  O2 Flow (L/min): 2    04-23-23 @ 07:01  -  04-24-23 @ 07:00  --------------------------------------------------------  IN: 2229 mL / OUT: 800 mL / NET: 1429 mL    04-24-23 @ 07:01  -  04-24-23 @ 13:56  --------------------------------------------------------  IN: 330 mL / OUT: 700 mL / NET: -370 mL        Physical Exam  General: alert and oriented x 3  Chest: cta bl  CVS: s1s2  Abd: pos bs soft nt  GI/ :  Ext: 2+ edema with bl leg ulcers wrapped    LABS:                        7.8    6.32  )-----------( 382      ( 24 Apr 2023 06:04 )             26.8     COUMADIN:   [ ] YES [ x] NO      04-24    141  |  105  |  17  ----------------------------<  123<H>  3.9   |  29  |  0.8    Ca    7.7<L>      24 Apr 2023 06:04  Mg     2.1     04-24    TPro  5.4<L>  /  Alb  2.4<L>  /  TBili  0.3  /  DBili  x   /  AST  25  /  ALT  13  /  AlkPhos  73  04-24    MEDICATIONS  (STANDING):  aspirin  chewable 81 milliGRAM(s) Oral daily  atorvastatin 40 milliGRAM(s) Oral at bedtime  enoxaparin Injectable 40 milliGRAM(s) SubCutaneous every 24 hours  furosemide   Injectable 40 milliGRAM(s) IV Push daily  iron sucrose IVPB 200 milliGRAM(s) IV Intermittent every 24 hours  metoprolol tartrate 12.5 milliGRAM(s) Oral two times a day  pantoprazole    Tablet 40 milliGRAM(s) Oral two times a day    MEDICATIONS  (PRN):      Allergies    Allergy Status Unknown    Intolerances        Ambulation/Activity Status:  amb with walker      RADIOLOGY & ADDITIONAL TESTS:  Diet, NPO:   NPO for Procedure/Test     NPO Start Date: 24-Apr-2023,   NPO Start Time: 09:30  Except Medications (04-24-23 @ 10:15)  CT Angio Abdomen and Pelvis TAVR NAYELI w/wo IV Cont: Routine   Indication: aortic stenosis  [facility]  Provider's Contact #: [phone] (04-24-23 @ 08:37)  CT Angio Chest TAVR NAYELI w/wo IV Cont: Routine   Indication: aortic stenosis  [facility]: [facility]  Provider's Contact #: [phone] (04-24-23 @ 08:37)    awaiting ct scans as per tavr protocol and transvaginal US    Assessment/Plan: Patient is a 76 yo female pre-op for tavr  cont present tx as per medicine  vaginal bleeding-  awaiting transvaginal us  tavr protocol ct scans today   pt should also have pre-op workup while hospitalized including cardiac cath, but tavr will be performed as an outpatient      Social Service Disposition:    pr-op tav

## 2023-04-24 NOTE — PROGRESS NOTE ADULT - SUBJECTIVE AND OBJECTIVE BOX
JESÚS BAKER 77y Female  MRN#: [id number]     Hospital Day: 4d    Pt is currently admitted with the primary diagnosis of  Localized edema        SUBJECTIVE     Overnight events  None    Subjective complaints  Pt was evaluated this am. Patient denied any active complaints and per patient her symptoms are improving                                            ----------------------------------------------------------  OBJECTIVE  PAST MEDICAL & SURGICAL HISTORY  H/O atrial tachycardia    Osteoarthritis    Hypertension    Aortic stenosis    No significant past surgical history                                              -----------------------------------------------------------  ALLERGIES:  Allergy Status Unknown                                            ------------------------------------------------------------    HOME MEDICATIONS  Home Medications:  furosemide 20 mg oral tablet: 1 orally once a day (21 Apr 2023 00:52)  metOLazone 2.5 mg oral tablet: 1 orally 2 times a week (21 Apr 2023 00:52)                           MEDICATIONS:  STANDING MEDICATIONS  aspirin  chewable 81 milliGRAM(s) Oral daily  atorvastatin 40 milliGRAM(s) Oral at bedtime  enoxaparin Injectable 40 milliGRAM(s) SubCutaneous every 24 hours  furosemide   Injectable 40 milliGRAM(s) IV Push daily  iron sucrose IVPB 200 milliGRAM(s) IV Intermittent every 24 hours  metoprolol tartrate 12.5 milliGRAM(s) Oral two times a day  pantoprazole    Tablet 40 milliGRAM(s) Oral two times a day    PRN MEDICATIONS                                            ------------------------------------------------------------  VITAL SIGNS: Last 24 Hours  T(C): 37.2 (24 Apr 2023 05:00), Max: 37.2 (24 Apr 2023 05:00)  T(F): 99 (24 Apr 2023 05:00), Max: 99 (24 Apr 2023 05:00)  HR: 77 (24 Apr 2023 05:00) (67 - 77)  BP: 108/54 (24 Apr 2023 05:00) (82/50 - 108/54)  BP(mean): 61 (23 Apr 2023 14:00) (61 - 61)  RR: 18 (24 Apr 2023 05:00) (16 - 28)  SpO2: 99% (23 Apr 2023 22:01) (97% - 99%)      04-23-23 @ 07:01  -  04-24-23 @ 07:00  --------------------------------------------------------  IN: 2229 mL / OUT: 800 mL / NET: 1429 mL    04-24-23 @ 07:01  -  04-24-23 @ 11:11  --------------------------------------------------------  IN: 330 mL / OUT: 0 mL / NET: 330 mL                                             --------------------------------------------------------------  LABS:                        7.8    6.32  )-----------( 382      ( 24 Apr 2023 06:04 )             26.8     04-24    141  |  105  |  17  ----------------------------<  123<H>  3.9   |  29  |  0.8    Ca    7.7<L>      24 Apr 2023 06:04  Mg     2.1     04-24    TPro  5.4<L>  /  Alb  2.4<L>  /  TBili  0.3  /  DBili  x   /  AST  25  /  ALT  13  /  AlkPhos  73  04-24                  CARDIAC MARKERS ( 23 Apr 2023 05:08 )  x     / 0.41 ng/mL / x     / x     / x                                                  -------------------------------------------------------------  RADIOLOGY:  < from: Xray Chest 1 View- PORTABLE-Routine (Xray Chest 1 View- PORTABLE-Routine in AM.) (04.24.23 @ 05:47) >  IMPRESSION:    Stable bilateral opacities.        < from: [facility] Duplex Lower Ext Vein Scan, Bilat (04.20.23 @ 18:28) >  IMPRESSION:  No evidence of deep venous thrombosis in either lower extremity.  Bilateral calf veins are not visualized due to presence of Unna boot.      < from: TTE Echo Complete w/ Contrast w/o Doppler (04.21.23 @ 07:58) >  Summary:   1. Hyperdynamic global left ventricular systolic function.   2. LV Ejection Fraction by Salcido's Method with a biplane EF of 75 %.   3. Moderate concentric left ventricular hypertrophy.   4. There is an intracavitary gradient up to 48mmHg at rest.   5. Severe biatrial dilation.   6. Normal right ventricular size and function.   7. Severe 'critical' aortic valve stenosis due to fibrocalcific AV. Vmax   5.49 m/s, MG 74 mmHg, and HARRIET 0.63cm^2.   8. Mild mitral stenosis due to degenerative MV, with mean gradient of   4.0 mmHg at HR 97 bpm.   9. Mild-moderate tricuspid regurgitation.  10. Estimated pulmonary artery systolic pressure is 80.9 mmHg assuming a   right atrial pressure of 8 mmHg, which is consistent with severe   pulmonary hypertension.                                                --------------------------------------------------------------    PHYSICAL EXAM:  GENERAL: NAD, lying in bed comfortably  HEAD:  Atraumatic, Normocephalic  EYES: EOMI, conjunctiva and sclera clear  ENT: Moist mucous membranes  NECK: Supple, No JVD  CHEST/LUNG: B/L basilar crackles present. Unlabored respirations  HEART: Systolic murmur present loudest over aortic and tricuspid area  ABDOMEN: Bowel sounds present; Soft, Nontender, Nondistended.    EXTREMITIES: bilateral non-pitting 3+ edema present a/w tenderness  NERVOUS SYSTEM:  Alert & Oriented X3. No focal deficits   SKIN: No rashes or lesions                                           --------------------------------------------------------------

## 2023-04-24 NOTE — PROGRESS NOTE ADULT - SUBJECTIVE AND OBJECTIVE BOX
JESÚS TERRELL  77y Female    CHIEF COMPLAINT:    Patient is a 77y old  Female who presents with a chief complaint of Fall (24 Apr 2023 11:10)      INTERVAL HPI/OVERNIGHT EVENTS:    Patient seen and examined. C/O LE swelling. Denies sob. No cp. BP is running low. Having vaginal bleeding. Give h/o on and off vaginal bleeding for the last one year.     ROS: All other systems are negative.    Vital Signs:    T(F): 99 (04-24-23 @ 05:00), Max: 99 (04-24-23 @ 05:00)  HR: 77 (04-24-23 @ 05:00) (72 - 77)  BP: 108/54 (04-24-23 @ 05:00) (82/50 - 108/54)  RR: 18 (04-24-23 @ 05:00) (18 - 28)  SpO2: 99% (04-23-23 @ 22:01) (98% - 99%)  I&O's Summary    23 Apr 2023 07:01  -  24 Apr 2023 07:00  --------------------------------------------------------  IN: 2229 mL / OUT: 800 mL / NET: 1429 mL    24 Apr 2023 07:01  -  24 Apr 2023 12:29  --------------------------------------------------------  IN: 330 mL / OUT: 0 mL / NET: 330 mL      Daily Height in cm: 167.64 (23 Apr 2023 20:43)    Daily   CAPILLARY BLOOD GLUCOSE          PHYSICAL EXAM:    GENERAL:  NAD  SKIN: No rashes or lesions  HENT: Atraumatic. Normocephalic. PERRL. Moist membranes.  NECK: Supple, No JVD. No lymphadenopathy.  PULMONARY: +ve rales in the bases B/L. No wheezing.   CVS: Normal S1, S2. Rate and Rhythm are regular. A IV/VI systolic mm over aortic area, LPSB.   ABDOMEN/GI: Soft, Nontender, Nondistended; BS present  EXTREMITIES: Peripheral pulses intact. +ve edema B/L LE.  NEUROLOGIC:  No motor or sensory deficit.  PSYCH: Alert & oriented x 3    Consultant(s) Notes Reviewed:  [x ] YES  [ ] NO  Care Discussed with Consultants/Other Providers [ x] YES  [ ] NO    EKG reviewed  Telemetry reviewed    LABS:                        7.8    6.32  )-----------( 382      ( 24 Apr 2023 06:04 )             26.8   Hemoglobin: 7.8 g/dL (04-24 @ 06:04)  Hemoglobin: 7.9 g/dL (04-23 @ 05:08)  Hemoglobin: 8.1 g/dL (04-22 @ 17:19)  Hemoglobin: 7.0 g/dL (04-22 @ 04:47)  Hemoglobin: 7.4 g/dL (04-21 @ 06:12)  Hemoglobin: 7.8 g/dL (04-20 @ 17:03)    04-24    141  |  105  |  17  ----------------------------<  123<H>  3.9   |  29  |  0.8    Ca    7.7<L>      24 Apr 2023 06:04  Mg     2.1     04-24    TPro  5.4<L>  /  Alb  2.4<L>  /  TBili  0.3  /  DBili  x   /  AST  25  /  ALT  13  /  AlkPhos  73  04-24        Trop 0.41, CKMB --, CK --, 04-23-23 @ 05:08  Trop 0.22, CKMB --, CK --, 04-21-23 @ 17:05        RADIOLOGY & ADDITIONAL TESTS:    < from: Xray Chest 1 View- PORTABLE-Routine (Xray Chest 1 View- PORTABLE-Routine in AM.) (04.24.23 @ 05:47) >    IMPRESSION:    Stable bilateral opacities.    < end of copied text >  < from: VA Duplex Lower Ext Vein Scan, Bilat (04.20.23 @ 18:28) >    IMPRESSION:  No evidence of deep venous thrombosis in either lower extremity.  Bilateral calf veins are not visualized due to presence of Unna boot.    < end of copied text >  < from: TTE Echo Complete w/ Contrast w/o Doppler (04.21.23 @ 07:58) >    Summary:   1. Hyperdynamic global left ventricular systolic function.   2. LV Ejection Fraction by Salcido's Method with a biplane EF of 75 %.   3. Moderate concentric left ventricular hypertrophy.   4. There is an intracavitary gradient up to 48mmHg at rest.   5. Severe biatrial dilation.   6. Normal right ventricular size and function.   7. Severe 'critical' aortic valve stenosis due to fibrocalcific AV. Vmax   5.49 m/s, MG 74 mmHg, and HARRIET 0.63cm^2.   8. Mild mitral stenosis due to degenerative MV, with mean gradient of   4.0 mmHg at HR 97 bpm.   9. Mild-moderate tricuspid regurgitation.  10. Estimated pulmonary artery systolic pressure is 80.9 mmHg assuming a   right atrial pressure of 8 mmHg, which is consistent with severe   pulmonary hypertension.  11. Discussed findings with Dr Silvino Lovelace (primary team).    < end of copied text >  < from: TTE Echo Complete w/ Contrast w/o Doppler (04.21.23 @ 07:58) >  Venous: The inferior vena cava was dilated, with respiratory size   variation greater than 50%.    < end of copied text >    Imaging or report Personally Reviewed:  [x ] YES  [ ] NO    Medications:  Standing  aspirin  chewable 81 milliGRAM(s) Oral daily  atorvastatin 40 milliGRAM(s) Oral at bedtime  enoxaparin Injectable 40 milliGRAM(s) SubCutaneous every 24 hours  furosemide   Injectable 40 milliGRAM(s) IV Push daily  iron sucrose IVPB 200 milliGRAM(s) IV Intermittent every 24 hours  metoprolol tartrate 12.5 milliGRAM(s) Oral two times a day  pantoprazole    Tablet 40 milliGRAM(s) Oral two times a day    PRN Meds      Case discussed with resident    Care discussed with pt/family

## 2023-04-25 LAB
ALBUMIN SERPL ELPH-MCNC: 2.2 G/DL — LOW (ref 3.5–5.2)
ALP SERPL-CCNC: 79 U/L — SIGNIFICANT CHANGE UP (ref 30–115)
ALT FLD-CCNC: 13 U/L — SIGNIFICANT CHANGE UP (ref 0–41)
ANION GAP SERPL CALC-SCNC: 8 MMOL/L — SIGNIFICANT CHANGE UP (ref 7–14)
AST SERPL-CCNC: 28 U/L — SIGNIFICANT CHANGE UP (ref 0–41)
BASOPHILS # BLD AUTO: 0.06 K/UL — SIGNIFICANT CHANGE UP (ref 0–0.2)
BASOPHILS NFR BLD AUTO: 0.9 % — SIGNIFICANT CHANGE UP (ref 0–1)
BILIRUB SERPL-MCNC: 0.5 MG/DL — SIGNIFICANT CHANGE UP (ref 0.2–1.2)
BUN SERPL-MCNC: 20 MG/DL — SIGNIFICANT CHANGE UP (ref 10–20)
CALCIUM SERPL-MCNC: 7.4 MG/DL — LOW (ref 8.4–10.5)
CHLORIDE SERPL-SCNC: 101 MMOL/L — SIGNIFICANT CHANGE UP (ref 98–110)
CO2 SERPL-SCNC: 29 MMOL/L — SIGNIFICANT CHANGE UP (ref 17–32)
CREAT SERPL-MCNC: 0.8 MG/DL — SIGNIFICANT CHANGE UP (ref 0.7–1.5)
EGFR: 75 ML/MIN/1.73M2 — SIGNIFICANT CHANGE UP
EOSINOPHIL # BLD AUTO: 0.34 K/UL — SIGNIFICANT CHANGE UP (ref 0–0.7)
EOSINOPHIL NFR BLD AUTO: 4.9 % — SIGNIFICANT CHANGE UP (ref 0–8)
GLUCOSE SERPL-MCNC: 100 MG/DL — HIGH (ref 70–99)
HCT VFR BLD CALC: 28.7 % — LOW (ref 37–47)
HGB BLD-MCNC: 8.3 G/DL — LOW (ref 12–16)
IMM GRANULOCYTES NFR BLD AUTO: 0.9 % — HIGH (ref 0.1–0.3)
LYMPHOCYTES # BLD AUTO: 1.54 K/UL — SIGNIFICANT CHANGE UP (ref 1.2–3.4)
LYMPHOCYTES # BLD AUTO: 22.2 % — SIGNIFICANT CHANGE UP (ref 20.5–51.1)
MAGNESIUM SERPL-MCNC: 1.9 MG/DL — SIGNIFICANT CHANGE UP (ref 1.8–2.4)
MCHC RBC-ENTMCNC: 23.3 PG — LOW (ref 27–31)
MCHC RBC-ENTMCNC: 28.9 G/DL — LOW (ref 32–37)
MCV RBC AUTO: 80.6 FL — LOW (ref 81–99)
MONOCYTES # BLD AUTO: 0.68 K/UL — HIGH (ref 0.1–0.6)
MONOCYTES NFR BLD AUTO: 9.8 % — HIGH (ref 1.7–9.3)
NEUTROPHILS # BLD AUTO: 4.25 K/UL — SIGNIFICANT CHANGE UP (ref 1.4–6.5)
NEUTROPHILS NFR BLD AUTO: 61.3 % — SIGNIFICANT CHANGE UP (ref 42.2–75.2)
NRBC # BLD: 0 /100 WBCS — SIGNIFICANT CHANGE UP (ref 0–0)
PLATELET # BLD AUTO: 395 K/UL — SIGNIFICANT CHANGE UP (ref 130–400)
PMV BLD: 9.7 FL — SIGNIFICANT CHANGE UP (ref 7.4–10.4)
POTASSIUM SERPL-MCNC: 3.8 MMOL/L — SIGNIFICANT CHANGE UP (ref 3.5–5)
POTASSIUM SERPL-SCNC: 3.8 MMOL/L — SIGNIFICANT CHANGE UP (ref 3.5–5)
PROT SERPL-MCNC: 5.2 G/DL — LOW (ref 6–8)
RBC # BLD: 3.56 M/UL — LOW (ref 4.2–5.4)
RBC # FLD: 20.6 % — HIGH (ref 11.5–14.5)
SODIUM SERPL-SCNC: 138 MMOL/L — SIGNIFICANT CHANGE UP (ref 135–146)
WBC # BLD: 6.93 K/UL — SIGNIFICANT CHANGE UP (ref 4.8–10.8)
WBC # FLD AUTO: 6.93 K/UL — SIGNIFICANT CHANGE UP (ref 4.8–10.8)

## 2023-04-25 PROCEDURE — 99233 SBSQ HOSP IP/OBS HIGH 50: CPT

## 2023-04-25 PROCEDURE — 74174 CTA ABD&PLVS W/CONTRAST: CPT | Mod: 26

## 2023-04-25 PROCEDURE — 71275 CT ANGIOGRAPHY CHEST: CPT | Mod: 26

## 2023-04-25 RX ORDER — POTASSIUM CHLORIDE 20 MEQ
20 PACKET (EA) ORAL ONCE
Refills: 0 | Status: COMPLETED | OUTPATIENT
Start: 2023-04-25 | End: 2023-04-25

## 2023-04-25 RX ADMIN — ATORVASTATIN CALCIUM 40 MILLIGRAM(S): 80 TABLET, FILM COATED ORAL at 21:51

## 2023-04-25 RX ADMIN — Medication 12.5 MILLIGRAM(S): at 06:07

## 2023-04-25 RX ADMIN — Medication 20 MILLIEQUIVALENT(S): at 12:29

## 2023-04-25 RX ADMIN — IRON SUCROSE 110 MILLIGRAM(S): 20 INJECTION, SOLUTION INTRAVENOUS at 00:12

## 2023-04-25 RX ADMIN — PANTOPRAZOLE SODIUM 40 MILLIGRAM(S): 20 TABLET, DELAYED RELEASE ORAL at 06:08

## 2023-04-25 RX ADMIN — PANTOPRAZOLE SODIUM 40 MILLIGRAM(S): 20 TABLET, DELAYED RELEASE ORAL at 17:28

## 2023-04-25 RX ADMIN — Medication 40 MILLIGRAM(S): at 06:10

## 2023-04-25 RX ADMIN — Medication 12.5 MILLIGRAM(S): at 17:28

## 2023-04-25 RX ADMIN — ENOXAPARIN SODIUM 40 MILLIGRAM(S): 100 INJECTION SUBCUTANEOUS at 00:12

## 2023-04-25 RX ADMIN — Medication 81 MILLIGRAM(S): at 11:41

## 2023-04-25 NOTE — OCCUPATIONAL THERAPY INITIAL EVALUATION ADULT - PERTINENT HX OF CURRENT PROBLEM, REHAB EVAL
77 y.o Female with PMHx of HTN, Severe Aortic stenosis, atrial tachycardia, chronic venous stasis and OA BIBEMS for chief complaint of fall. Pt found to have chronic venous stasis with Bilateral LE edema. Vascular recommended no acute intervention. Pt should elevate Legs and ACE wrap them. Pt with severe AS - CT surgery followed and recommended CT angio Abdomen and pelvis TAVR NAYELI w/wo IV Cont and CT angio Chest TAVR w/wo IV cont. Pt should also have pre-op workup while hospitalized including cardiac cath, but tavr will be performed as an outpatient as per CT sx. Patient has history of bleeding per vagina, on and off for ?1 year, last episode last night

## 2023-04-25 NOTE — CONSULT NOTE ADULT - SUBJECTIVE AND OBJECTIVE BOX
Patient is a 77y old  Female who presents with a chief complaint of Fall (25 Apr 2023 11:52)   Patient is here for dental clearance from cardiothoracic.       HPI:  77 y.o Female with PMHx of HTN, Aortic stenosis, atrial tachycardia, chronic venous stasis and OA BIBEMS for chief complaint of fall. Patient states she is here because her doctor told her to come in for evaluation of her legs which she states have been swelling up. She did fall earlier today but she was sliding to the side of her couch and landed on her buttock. She states she has been experiencing difficulty walking because of her legs being swollen and she feels a burning pain in her left ankle. Her legs are seeping fluid, and has bilateral ulcers. Denies any pain, head injury or being on any blood thinners. Pt denies any urinary or bowel incontinence , back or neck pain, numbness, tingling or focal weakness.    Vital Signs Last 24 Hrs  T(C): 36.3 (20 Apr 2023 14:27), Max: 36.3 (20 Apr 2023 14:27)  T(F): 97.3 (20 Apr 2023 14:27), Max: 97.3 (20 Apr 2023 14:27)  HR: 94 (20 Apr 2023 14:27) (94 - 94)  BP: 122/70 (20 Apr 2023 14:27) (122/70 - 122/70)  BP(mean): --  RR: 14 (20 Apr 2023 14:27) (14 - 14)  SpO2: 98% (20 Apr 2023 14:27) (98% - 98%)    Parameters below as of 20 Apr 2023 14:27  Patient On (Oxygen Delivery Method): room air     (21 Apr 2023 00:03)      PAST MEDICAL & SURGICAL HISTORY:  H/O atrial tachycardia      Osteoarthritis      Hypertension      Aortic stenosis      No significant past surgical history        (   ) heart valve replacement  (   ) joint replacement  (   ) pregnancy    MEDICATIONS  (STANDING):  aspirin  chewable 81 milliGRAM(s) Oral daily  atorvastatin 40 milliGRAM(s) Oral at bedtime  enoxaparin Injectable 40 milliGRAM(s) SubCutaneous every 24 hours  furosemide   Injectable 40 milliGRAM(s) IV Push daily  iron sucrose IVPB 200 milliGRAM(s) IV Intermittent every 24 hours  metoprolol tartrate 12.5 milliGRAM(s) Oral two times a day  pantoprazole    Tablet 40 milliGRAM(s) Oral two times a day    MEDICATIONS  (PRN):      Allergies    Allergy Status Unknown    Intolerances        FAMILY HISTORY:  No pertinent family history in first degree relatives        *SOCIAL HISTORY: (   ) Tobacco; (   ) ETOH    *Last Dental Visit:    Vital Signs Last 24 Hrs  T(C): 36.8 (25 Apr 2023 12:18), Max: 37.4 (24 Apr 2023 21:00)  T(F): 98.2 (25 Apr 2023 12:18), Max: 99.4 (24 Apr 2023 21:00)  HR: 77 (25 Apr 2023 12:18) (72 - 80)  BP: 90/44 (25 Apr 2023 12:18) (87/51 - 103/55)  BP(mean): --  RR: 18 (25 Apr 2023 12:18) (18 - 18)  SpO2: --        LABS:                        8.3    6.93  )-----------( 395      ( 25 Apr 2023 07:04 )             28.7     04-25    138  |  101  |  20  ----------------------------<  100<H>  3.8   |  29  |  0.8    Ca    7.4<L>      25 Apr 2023 07:04  Mg     1.9     04-25    TPro  5.2<L>  /  Alb  2.2<L>  /  TBili  0.5  /  DBili  x   /  AST  28  /  ALT  13  /  AlkPhos  79  04-25    WBC Count: 6.93 K/uL [4.80 - 10.80] (04-25 @ 07:04)  Platelet Count - Automated: 395 K/uL [130 - 400] (04-25 @ 07:04)  WBC Count: 6.32 K/uL [4.80 - 10.80] (04-24 @ 06:04)  Platelet Count - Automated: 382 K/uL [130 - 400] (04-24 @ 06:04)  WBC Count: 7.09 K/uL [4.80 - 10.80] (04-23 @ 05:08)  Platelet Count - Automated: 386 K/uL [130 - 400] (04-23 @ 05:08)  WBC Count: 6.65 K/uL [4.80 - 10.80] (04-22 @ 17:19)  Platelet Count - Automated: 401 K/uL *H* [130 - 400] (04-22 @ 17:19)          EOE:  TMJ ( -  ) clicks                     ( -  ) pops                     (  - ) crepitus             Mandible <<FROM>>             Facial bones and MOM <<grossly intact>>             (  - ) trismus             (  - ) lymphadenopathy             (  - ) swelling             ( -  ) asymmetry             ( -  ) palpation             (  - ) dyspnea             ( -  ) dysphagia             ( -  ) loss of consciousness    IOE:  <<permanent/primary/mixed>> dentition: <<grossly intact>> OR <<multiple carious teeth>> OR <<multiple missing teeth>>           hard/soft palate:  (   ) palatal torus, <<No pathology noted>>           tongue/FOM <<No pathology noted>>           labial/buccal mucosa <<No pathology noted>>           ( -  ) percussion           ( -  ) palpation           ( -  ) swelling            (  - ) abscess           (  - ) sinus tract    Dentition present: <<   >>  Mobility: <<  >>  Caries: <<   >>         *DENTAL RADIOGRAPHS: Periapical x-ray     RADIOLOGY & ADDITIONAL STUDIES:    *ASSESSMENT: Clinically observed residual root tips #1,3,9,13,14 , and #28.   Teeth #22, 23,24,25,26 and #27       *PLAN: Bring patient into clinic for extractions on Thursday.        RECOMMENDATIONS:  1) <<   >>  2) Dental F/U with outpatient dentist for comprehensive dental care.   3) If any difficulty swallowing/breathing, fever occur, return to ER.     Resident Name, pager # Patient is a 77y old  Female who presents with a chief complaint of Fall (25 Apr 2023 11:52)   Patient is here for dental clearance from cardiothoracic.       HPI:  77 y.o Female with PMHx of HTN, Aortic stenosis, atrial tachycardia, chronic venous stasis and OA BIBEMS for chief complaint of fall. Patient states she is here because her doctor told her to come in for evaluation of her legs which she states have been swelling up. She did fall earlier today but she was sliding to the side of her couch and landed on her buttock. She states she has been experiencing difficulty walking because of her legs being swollen and she feels a burning pain in her left ankle. Her legs are seeping fluid, and has bilateral ulcers. Denies any pain, head injury or being on any blood thinners. Pt denies any urinary or bowel incontinence , back or neck pain, numbness, tingling or focal weakness.    Vital Signs Last 24 Hrs  T(C): 36.3 (20 Apr 2023 14:27), Max: 36.3 (20 Apr 2023 14:27)  T(F): 97.3 (20 Apr 2023 14:27), Max: 97.3 (20 Apr 2023 14:27)  HR: 94 (20 Apr 2023 14:27) (94 - 94)  BP: 122/70 (20 Apr 2023 14:27) (122/70 - 122/70)  BP(mean): --  RR: 14 (20 Apr 2023 14:27) (14 - 14)  SpO2: 98% (20 Apr 2023 14:27) (98% - 98%)    Parameters below as of 20 Apr 2023 14:27  Patient On (Oxygen Delivery Method): room air     (21 Apr 2023 00:03)      PAST MEDICAL & SURGICAL HISTORY:  H/O atrial tachycardia      Osteoarthritis      Hypertension      Aortic stenosis      No significant past surgical history        (   ) heart valve replacement  (   ) joint replacement  (   ) pregnancy    MEDICATIONS  (STANDING):  aspirin  chewable 81 milliGRAM(s) Oral daily  atorvastatin 40 milliGRAM(s) Oral at bedtime  enoxaparin Injectable 40 milliGRAM(s) SubCutaneous every 24 hours  furosemide   Injectable 40 milliGRAM(s) IV Push daily  iron sucrose IVPB 200 milliGRAM(s) IV Intermittent every 24 hours  metoprolol tartrate 12.5 milliGRAM(s) Oral two times a day  pantoprazole    Tablet 40 milliGRAM(s) Oral two times a day    MEDICATIONS  (PRN):      Allergies    Allergy Status Unknown    Intolerances        FAMILY HISTORY:  No pertinent family history in first degree relatives        *SOCIAL HISTORY: (   ) Tobacco; (   ) ETOH    *Last Dental Visit:    Vital Signs Last 24 Hrs  T(C): 36.8 (25 Apr 2023 12:18), Max: 37.4 (24 Apr 2023 21:00)  T(F): 98.2 (25 Apr 2023 12:18), Max: 99.4 (24 Apr 2023 21:00)  HR: 77 (25 Apr 2023 12:18) (72 - 80)  BP: 90/44 (25 Apr 2023 12:18) (87/51 - 103/55)  BP(mean): --  RR: 18 (25 Apr 2023 12:18) (18 - 18)  SpO2: --        LABS:                        8.3    6.93  )-----------( 395      ( 25 Apr 2023 07:04 )             28.7     04-25    138  |  101  |  20  ----------------------------<  100<H>  3.8   |  29  |  0.8    Ca    7.4<L>      25 Apr 2023 07:04  Mg     1.9     04-25    TPro  5.2<L>  /  Alb  2.2<L>  /  TBili  0.5  /  DBili  x   /  AST  28  /  ALT  13  /  AlkPhos  79  04-25    WBC Count: 6.93 K/uL [4.80 - 10.80] (04-25 @ 07:04)  Platelet Count - Automated: 395 K/uL [130 - 400] (04-25 @ 07:04)  WBC Count: 6.32 K/uL [4.80 - 10.80] (04-24 @ 06:04)  Platelet Count - Automated: 382 K/uL [130 - 400] (04-24 @ 06:04)  WBC Count: 7.09 K/uL [4.80 - 10.80] (04-23 @ 05:08)  Platelet Count - Automated: 386 K/uL [130 - 400] (04-23 @ 05:08)  WBC Count: 6.65 K/uL [4.80 - 10.80] (04-22 @ 17:19)  Platelet Count - Automated: 401 K/uL *H* [130 - 400] (04-22 @ 17:19)          EOE:  TMJ ( -  ) clicks                     ( -  ) pops                     (  - ) crepitus             Mandible <<FROM>>             Facial bones and MOM <<grossly intact>>             (  - ) trismus             (  - ) lymphadenopathy             (  - ) swelling             ( -  ) asymmetry             ( -  ) palpation             (  - ) dyspnea             ( -  ) dysphagia             ( -  ) loss of consciousness    IOE:  <<permanent/primary/mixed>> dentition: <<grossly intact>> OR <<multiple carious teeth>> OR <<multiple missing teeth>>           hard/soft palate:  (   ) palatal torus, <<No pathology noted>>           tongue/FOM <<No pathology noted>>           labial/buccal mucosa <<No pathology noted>>           ( -  ) percussion           ( -  ) palpation           ( -  ) swelling            (  - ) abscess           (  - ) sinus tract    Dentition present: <<   >>  Mobility: <<  >>  Caries: <<   >>         *DENTAL RADIOGRAPHS: Periapical x-ray     RADIOLOGY & ADDITIONAL STUDIES:    *ASSESSMENT: Clinically observed residual root tips #1,3,9,13,14 , and #28.   Teeth #22, 23,24,25,26 and #27 are intact.         *PLAN: Bring patient into clinic for extractions on Thursday.  Teeth #1,3,9,13,14 , and #28 need to be extracted.       RECOMMENDATIONS:  1) <<   >>  2) Dental F/U with outpatient dentist for comprehensive dental care.   3) If any difficulty swallowing/breathing, fever occur, return to ER.     Resident Name, pager #

## 2023-04-25 NOTE — OCCUPATIONAL THERAPY INITIAL EVALUATION ADULT - NSOTDISCHREC_GEN_A_CORE
PT requires assistance with ADLS. OT recommends d/c to rehabilitation facility when medically stable. Refer to IE for details.

## 2023-04-25 NOTE — CONSULT NOTE ADULT - NSCONSULTADDITIONALINFOA_GEN_ALL_CORE
78yo P2 postmenopausal with postmenopausal bleeding and thickened stripe s/p EMB    - GYN will f/u pathology and recommendations thereafter  - dvt ppx ok, no concern for active bleeding at this time   - page GYN with any further questions

## 2023-04-25 NOTE — PROGRESS NOTE ADULT - ASSESSMENT
77 y.o Female with PMHx of HTN, Aortic stenosis, atrial tachycardia, chronic venous stasis and OA BIBEMS for chief complaint of fall. She was sent in by her doctor for evaluation of her leg swelling. She fell while sliding to the side of her couch and landed on her buttock.     Acute HFpEF due to critical AS  Critical AS  S/P Fall.   NSTEMI likely type 2  Vaginal bleeding  Iron deficiency anemia  HTN  Chronic venous stasis.             PLAN:    ·	Cont tele  ·	Troponin trending up but CKMB is normal. Likely due NSTEMI type 2  ·	Cont Lasix 40 mg ivp daily  ·	Check i's and o's and daily wt  ·	Low salt diet and water restriction to 1.5 L/D  ·	ECHO reviewed. EF is 75%. Critical AS. Severe pulmonary HTN. IVC dilated.   ·	Structural heart disease eval  ·	Transvaginal US reviewed. The upper vaginal canal is distended with fluid. Postmenopausal vaginal thickening to 9 mm  ·	Vaginal bleeding; OBGYN eval.  ·	S/P one unit of PRBC'S  ·	Monitor H/H  ·	Cont IV Venofer 200 mg iv daily x 5 doses.   ·	CTS eval noted. Will need AVR  ·	TAVR protocol CT. Will also need cardiac cath.     Progress Note Handoff    Pending (specify):  Consults__OBGYN, Structural heart disease consult_______, Tests_______, Test Results_______, Other_________  Family discussion:  Disposition: Home___/SNF___/Other________/Unknown at this time________    Carloz Cheatham MD  Spectra: [phone]

## 2023-04-25 NOTE — PROGRESS NOTE ADULT - ASSESSMENT
{\rtf1\lsjwqj38493\ansi\zenfgjk7182\ftnbj\uc1\deff0  {\fonttbl{\f0 \fnil Segoe UI;}{\f1 \fnil \fcharset0 Segoe UI;}{\f2 \fnil Times New Pro;}}  {\colortbl ;\yes816\eqcdg037\lcss347 ;\red0\green0\blue0 ;\red0\green0\blue0 ;}  {\stylesheet{\f0\fs20 Normal;}{\cs1 Default Paragraph Font;}{\cs2\f0\fs16 Line Number;}{\cs3\f2\fs24 Hyperlink;}}  {\*\revtbl{Unknown;}}  \iucgge98470\tmvjsv17993\yolls1959\fjlwc9577\hotdo9950\hvxqa5645\hfchkys597\onmakyt225\nogrowautofit\olyijp077\formshade\nofeaturethrottle1\dntblnsbdb\fet4\aendnotes\aftnnrlc\pgbrdrhead\pgbrdrfoot  \sectd\jmuwbp43969\ctiqsa58832\guttersxn0\kbiopqud8551\gvbfxduz0668\jzvzwgyv6754\sraxmxcl7824\zlxhjdb709\zrbdazx951\sbkpage\pgncont\pgndec  \plain\plain\f0\fs24\ql\plain\f0\fs24\plain\f1\fs16\nkul1522\hich\f1\dbch\f1\loch\f1\cf2\fs16 77 y.o Female with PMHx of HTN, Severe Aortic stenosis, atrial tachycardia, chronic venous stasis and OA BIBEMS for chief complaint of fall.\par  \par  # Chronic venous stasis \par  - Bilateral LE edema\par  - Takes Furosemide and metolazone \par  - Cont Furosemide IV 40mg qd\par  - Vascular and recommended no acute intervention \par  - Elevate Legs and ACE wrap them \par  \par  # Acute Microcytic anemia \par  - s/p 1 unit PRBC, H/Hl stable around ~ 7.5-8\par  - GI evaluation appreciated, pt refusing EGD/C-scope for now, c/w PPI\par  -patient has history of bleeding per vagina, on and off for ?1 year, last episode last night\par  -trend CBC\par  -GYN consult\par  -F/U TVUS\par  \par  \par  # Elevated Troponins \par  - Asymptomatic, no chest pain\par  - ECG: no ischemic changes \par  - c/w aspirin, statin and metoprolol\par  - will need LHC prior to AVR once anemia w/up completed, f/u with Dr Walker\par  \par  # Severe AS \par  - TAVR will be done as outpatient, but pre-op will be performed in patient: \par  - CT angio Abdomen and pelvis TAVR w and CT angio Chest TAVR --> f/u \par  - Carotid duplex: \par  \plain\f1\fs16\eelp4492\hich\f1\dbch\f1\loch\f1\cf2\fs16\b - cardiac cath --> contact cardiology \par  - pt should have pre-op workup while hospitalized including cardiac cath, but tavr will be performed as an outpatient as per CT sx\plain\f1\fs16\qstq6222\hich\f1\dbch\f1\loch\f1\cf2\fs16\par  \par  \par  #MISC\par  -DVT: lovenox sq \par  -GI: PPI \par  -Diet: DASH\par  -Activity: IAT \par  -Code status: Full code \par  -Dispo: F/U CT surgery, Follow up GYN\par  \par  \par  \plain\f1\fs16\gnit4279\hich\f1\dbch\f1\loch\f1\cf2\fs16\strike\plain\f0\fs20\koij2386\hich\f0\dbch\f0\loch\f0\fs20\par  }   77 y.o Female with PMHx of HTN, Severe Aortic stenosis, atrial tachycardia, chronic venous stasis and OA BIBEMS for chief complaint of fall, having pre-op workup for TAVR.    # Severe AS   - TAVR will be done as outpatient, but pre-op will be performed in patient:   - CT angio Abdomen and pelvis TAVR w and CT angio Chest TAVR --> f/u   - Carotid duplex: 20-39% stenosis in R internal carotid and 40-59% stenosis in left internal carotid (prelim read)  - cardiac cath --> contact cardiology     # Chronic venous stasis   - Bilateral LE edema  - Takes Furosemide and metolazone   - Cont Furosemide IV 40mg qd  - Vascular and recommended no acute intervention   - Elevate Legs and ACE wrap them     # Acute Microcytic anemia   - s/p 1 unit PRBC, H/Hl stable around ~ 7.5-8  - GI evaluation appreciated, pt refusing EGD/C-scope for now, c/w PPI  - patient has history of bleeding per vagina, on and off for ?1 year, last episode last night  - GYN consult  - TVUS: postmenopausal vaginal thickening 9mm    # Elevated Troponins   - Asymptomatic, no chest pain  - ECG: no ischemic changes   - c/w aspirin, statin and metoprolol  - will need LHC prior to AVR once anemia w/up completed, f/u with Dr Walker      #MISC  -DVT: lovenox sq   -GI: PPI   -Diet: DASH  -Activity: IAT   -Code status: Full code

## 2023-04-25 NOTE — PROGRESS NOTE ADULT - SUBJECTIVE AND OBJECTIVE BOX
JESÚS BAKER 77y Female  MRN#: [id number]     Hospital Day: 5d    Pt is currently admitted with the primary diagnosis of  Localized edema        SUBJECTIVE     Patient was seen this morning. Denies any complaints.                                             ----------------------------------------------------------  OBJECTIVE  PAST MEDICAL & SURGICAL HISTORY  H/O atrial tachycardia    Osteoarthritis    Hypertension    Aortic stenosis    No significant past surgical history                                              -----------------------------------------------------------  ALLERGIES:  Allergy Status Unknown                                            ------------------------------------------------------------    HOME MEDICATIONS  Home Medications:  furosemide 20 mg oral tablet: 1 orally once a day (21 Apr 2023 00:52)  metOLazone 2.5 mg oral tablet: 1 orally 2 times a week (21 Apr 2023 00:52)                           MEDICATIONS:  STANDING MEDICATIONS  aspirin  chewable 81 milliGRAM(s) Oral daily  atorvastatin 40 milliGRAM(s) Oral at bedtime  enoxaparin Injectable 40 milliGRAM(s) SubCutaneous every 24 hours  furosemide   Injectable 40 milliGRAM(s) IV Push daily  iron sucrose IVPB 200 milliGRAM(s) IV Intermittent every 24 hours  metoprolol tartrate 12.5 milliGRAM(s) Oral two times a day  pantoprazole    Tablet 40 milliGRAM(s) Oral two times a day    PRN MEDICATIONS                                            ------------------------------------------------------------  VITAL SIGNS: Last 24 Hours  T(C): 36.5 (25 Apr 2023 04:42), Max: 37.4 (24 Apr 2023 21:00)  T(F): 97.7 (25 Apr 2023 04:42), Max: 99.4 (24 Apr 2023 21:00)  HR: 80 (25 Apr 2023 04:42) (71 - 80)  BP: 103/55 (25 Apr 2023 04:42) (87/51 - 103/55)  BP(mean): --  RR: 18 (25 Apr 2023 04:42) (18 - 18)  SpO2: --      04-24-23 @ 07:01  -  04-25-23 @ 07:00  --------------------------------------------------------  IN: 670 mL / OUT: 2100 mL / NET: -1430 mL                                             --------------------------------------------------------------  LABS:                        8.3    6.93  )-----------( 395      ( 25 Apr 2023 07:04 )             28.7     04-25    138  |  101  |  20  ----------------------------<  100<H>  3.8   |  29  |  0.8    Ca    7.4<L>      25 Apr 2023 07:04  Mg     1.9     04-25    TPro  5.2<L>  /  Alb  2.2<L>  /  TBili  0.5  /  DBili  x   /  AST  28  /  ALT  13  /  AlkPhos  79  04-25          Troponin T, Serum: 0.46 ng/mL *HH* (04-24-23 @ 12:50)          CARDIAC MARKERS ( 24 Apr 2023 12:50 )  x     / 0.46 ng/mL / x     / x     / 1.4 ng/mL                                                --------------------------------------------------------------    PHYSICAL EXAM:  GENERAL: NAD, lying in bed comfortably  NERVOUS SYSTEM:  Alert & Oriented X3   CHEST/LUNG: Clear to auscultation bilaterally.  HEART: regular rate and rhythm; arotic murmur  ABDOMEN: Soft, Nontender,   EXTREMITIES: bilateral edema                                          --------------------------------------------------------------

## 2023-04-25 NOTE — PROGRESS NOTE ADULT - SUBJECTIVE AND OBJECTIVE BOX
JESÚS TERRELL  77y Female    CHIEF COMPLAINT:    Patient is a 77y old  Female who presents with a chief complaint of Fall (25 Apr 2023 09:39)      INTERVAL HPI/OVERNIGHT EVENTS:    Patient seen and examined. Sitting in a chair. No vaginal bleeding today. NO cp. No sob.    ROS: All other systems are negative.    Vital Signs:    T(F): 97.7 (04-25-23 @ 04:42), Max: 99.4 (04-24-23 @ 21:00)  HR: 80 (04-25-23 @ 04:42) (71 - 80)  BP: 103/55 (04-25-23 @ 04:42) (87/51 - 103/55)  RR: 18 (04-25-23 @ 04:42) (18 - 18)  SpO2: --  I&O's Summary    24 Apr 2023 07:01  -  25 Apr 2023 07:00  --------------------------------------------------------  IN: 670 mL / OUT: 2100 mL / NET: -1430 mL    25 Apr 2023 07:01  -  25 Apr 2023 11:53  --------------------------------------------------------  IN: 120 mL / OUT: 0 mL / NET: 120 mL      Daily     Daily   CAPILLARY BLOOD GLUCOSE          PHYSICAL EXAM:    GENERAL:  NAD  SKIN: No rashes or lesions  HENT: Atraumatic. Normocephalic. PERRL. Moist membranes.  NECK: Supple, No JVD. No lymphadenopathy.  PULMONARY: +ve rales in the bases B/L. No wheezing. No rales  CVS: Normal S1, S2. Rate and Rhythm are regular. A IV/VI systolic mm over aortic area and LPSB  ABDOMEN/GI: Soft, Nontender, Nondistended; BS present  EXTREMITIES: Peripheral pulses intact. No edema B/L LE.  NEUROLOGIC:  No motor or sensory deficit.  PSYCH: Alert & oriented x 3    Consultant(s) Notes Reviewed:  [x ] YES  [ ] NO  Care Discussed with Consultants/Other Providers [ x] YES  [ ] NO    EKG reviewed  Telemetry reviewed    LABS:                        8.3    6.93  )-----------( 395      ( 25 Apr 2023 07:04 )             28.7   Hemoglobin: 8.3 g/dL (04-25 @ 07:04)  Hemoglobin: 7.8 g/dL (04-24 @ 06:04)  Hemoglobin: 7.9 g/dL (04-23 @ 05:08)  Hemoglobin: 8.1 g/dL (04-22 @ 17:19)  Hemoglobin: 7.0 g/dL (04-22 @ 04:47)  Hemoglobin: 7.4 g/dL (04-21 @ 06:12)  Hemoglobin: 7.8 g/dL (04-20 @ 17:03)    04-25    138  |  101  |  20  ----------------------------<  100<H>  3.8   |  29  |  0.8    Ca    7.4<L>      25 Apr 2023 07:04  Mg     1.9     04-25    TPro  5.2<L>  /  Alb  2.2<L>  /  TBili  0.5  /  DBili  x   /  AST  28  /  ALT  13  /  AlkPhos  79  04-25        Trop 0.46, CKMB 1.4, CK --, 04-24-23 @ 12:50  Trop 0.41, CKMB --, CK --, 04-23-23 @ 05:08        RADIOLOGY & ADDITIONAL TESTS:    < from: VA Duplex Carotid, Bilat (04.24.23 @ 18:50) >    IMPRESSION: Mild 20-39% stenosis in the right internal carotid artery,   moderate stenosis in the left internal carotid artery 40-59%    < end of copied text >  < from: US Pelvis Complete (04.24.23 @ 18:03) >    IMPRESSION:  The upper vaginal canal is distended with fluid. Clinical correlation is   advised.    Postmenopausal vaginal thickening to 9 mm. Differential includes   hyperplasia or malignancy. Recommend GYN assessment.      < end of copied text >    Imaging or report Personally Reviewed:  [x ] YES  [ ] NO    Medications:  Standing  aspirin  chewable 81 milliGRAM(s) Oral daily  atorvastatin 40 milliGRAM(s) Oral at bedtime  enoxaparin Injectable 40 milliGRAM(s) SubCutaneous every 24 hours  furosemide   Injectable 40 milliGRAM(s) IV Push daily  iron sucrose IVPB 200 milliGRAM(s) IV Intermittent every 24 hours  metoprolol tartrate 12.5 milliGRAM(s) Oral two times a day  pantoprazole    Tablet 40 milliGRAM(s) Oral two times a day  potassium chloride   Powder 20 milliEquivalent(s) Oral once    PRN Meds      Case discussed with resident    Care discussed with pt/family

## 2023-04-25 NOTE — CONSULT NOTE ADULT - SUBJECTIVE AND OBJECTIVE BOX
OBGYN PGY1    Chief Complaint: vaginal bleeding    HPI: 78yo P2 postmenopausal PMHx of HTN, Aortic stenosis, atrial tachycardia, chronic venous stasis and OA BIBEMS admitted for LE swelling, now with postmenopausal bleeding. Patient reports that she has had redish bleeding every time that she pees for the past month. Denies dysuria, hematuria. Endorses passage of clots into her pads. Endorses a history of frequent bladder infections. Denies fevers, chills, unexplained weight loss. She has not presented to a GYN in the last 10 years.    Ob/Gyn History:  ObHx: P2, H/O 2 FT     GynHx: Denies history of ovarian cysts, uterine fibroids, abnormal paps, or STIs  Last Pap Smear - 10 years ago  Last Mammogram - 5 years ago  Last Colonoscopy - 5 years ago    Denies the following: constitutional symptoms, visual symptoms, cardiovascular symptoms, respiratory symptoms, GI symptoms, musculoskeletal symptoms, skin symptoms, neurologic symptoms, hematologic symptoms, allergic symptoms, psychiatric symptoms  Except any pertinent positives listed.     Home Medications:  furosemide 20 mg oral tablet: 1 orally once a day (2023 00:52)  metOLazone 2.5 mg oral tablet: 1 orally 2 times a week (2023 00:52)    Allergy Status Unknown    PAST MEDICAL & SURGICAL HISTORY:  H/O atrial tachycardia  Osteoarthritis  Hypertension  Aortic stenosis  No significant past surgical history    FAMILY HISTORY:  No pertinent family history in first degree relatives    SOCIAL HISTORY: Denies cigarette use, alcohol use, or illicit drug use    Vital Signs Last 24 Hrs  T(F): 98.2 (2023 12:18), Max: 99.4 (2023 21:00)  HR: 77 (2023 12:18) (72 - 80)  BP: 90/44 (2023 12:18) (87/51 - 103/55)  RR: 18 (2023 12:18) (18 - 18)    General Appearance - AAOx3, NAD  Heart - S1S2 regular rate and rhythm  Lung - CTA Bilaterally  Abdomen - Soft, nontender, nondistended, no rebound, no rigidity, no guarding, bowel sounds present    GYN/Pelvis: NOT YET COMPLETED    LABS:                        8.3    6.93  )-----------( 395      ( 2023 07:04 )             28.7         04-    138  |  101  |  20  ----------------------------<  100<H>  3.8   |  29  |  0.8    Ca    7.4<L>      2023 07:04  Mg     1.9         TPro  5.2<L>  /  Alb  2.2<L>  /  TBili  0.5  /  DBili  x   /  AST  28  /  ALT  13  /  AlkPhos  79      Culture - Urine (collected 23 @ 20:26)  Source: Clean Catch Clean Catch (Midstream)  Preliminary Report (23 @ 10:57):    >100,000 CFU/ml Escherichia coli    50,000 - 99,000 CFU/mL Enterococcus faecalis  Organism: Escherichia coli (23 @ 22:17)  Organism: Escherichia coli (23 @ 22:17)      Method Type: TED      -  Amikacin: S <=16      -  Amoxicillin/Clavulanic Acid: S <=8/4      -  Ampicillin: S <=8 These ampicillin results predict results for amoxicillin      -  Ampicillin/Sulbactam: S <=4/2 Enterobacter, Klebsiella aerogenes, Citrobacter, and Serratia may develop resistance during prolonged therapy (3-4 days)      -  Aztreonam: S <=4      -  Cefazolin: S <=2 For uncomplicated UTI with K. pneumoniae, E. coli, or P. mirablis: TED <=16 is sensitive and TED >=32 is resistant. This also predicts results for oral agents cefaclor, cefdinir, cefpodoxime, cefprozil, cefuroxime axetil, cephalexin and locarbef for uncomplicated UTI. Note that some isolates may be susceptible to these agents while testing resistant to cefazolin.      -  Cefepime: S <=2      -  Cefoxitin: S <=8      -  Ceftriaxone: S <=1 Enterobacter, Klebsiella aerogenes, Citrobacter, and Serratia may develop resistance during prolonged therapy      -  Cefuroxime: S <=4      -  Ciprofloxacin: S <=0.25      -  Ertapenem: S <=0.5      -  Gentamicin: S <=2      -  Imipenem: S <=1      -  Levofloxacin: S <=0.5      -  Meropenem: S <=1      -  Nitrofurantoin: S <=32 Should not be used to treat pyelonephritis      -  Piperacillin/Tazobactam: S <=8      -  Tobramycin: S <=2      -  Trimethoprim/Sulfamethoxazole: S <=0.5/9.5    RADIOLOGY & ADDITIONAL STUDIES:  < from: US Pelvis Complete (23 @ 18:03) >    ACC: [id number] EXAM:  US PELVIC COMPLETE   ORDERED BY: NICOLÁS PRICE     PROCEDURE DATE:  2023          INTERPRETATION:  CLINICAL INFORMATION: Vaginal bleeding, acute anemia    LMP: Postmenopausal    COMPARISON: None available.    TECHNIQUE:  Transabdominal pelvic sonogram only. Color and Spectral Doppler was   performed.    FINDINGS:    Uterus: 7.3 x 4.2 x 5.6 cm. Within normal limits.  Endometrium: 9 mm.    Right ovary: 2.0 x 1.1 x 1.7 cm. Within normal limits. Color Doppler flow   is demonstrated.  Left ovary: 2.0 x 1.1 x 2.0 cm Within normal limits. Color Doppler flow   is demonstrated.    Fluid: None.    Other: The upper vaginal canal is distended with fluid.    IMPRESSION:  The upper vaginal canal is distended with fluid. Clinical correlation is   advised.    Postmenopausal vaginal thickening to 9 mm. Differential includes   hyperplasia or malignancy. Recommend GYN assessment.        --- End of Report ---            LCARISSA HODGES MD; Attending Radiologist  This document has been electronically signed. 2023  8:03AM    < end of copied text >   OBGYN PGY1    Chief Complaint: vaginal bleeding    HPI: 78yo P2 postmenopausal PMHx of HTN, Aortic stenosis, atrial tachycardia, chronic venous stasis and OA BIBEMS admitted for LE swelling, now with postmenopausal bleeding. Patient reports that she has had redish bleeding every time that she pees for the past month. Denies dysuria, hematuria. Endorses passage of clots into her pads. Endorses a history of frequent bladder infections. Denies fevers, chills, unexplained weight loss. She has not presented to a GYN in the last 10 years.    Ob/Gyn History:  ObHx: P2, H/O 2 FT     GynHx: Denies history of ovarian cysts, uterine fibroids, abnormal paps, or STIs  Last Pap Smear - 10 years ago  Last Mammogram - 5 years ago  Last Colonoscopy - 5 years ago    Denies the following: constitutional symptoms, visual symptoms, cardiovascular symptoms, respiratory symptoms, GI symptoms, musculoskeletal symptoms, skin symptoms, neurologic symptoms, hematologic symptoms, allergic symptoms, psychiatric symptoms  Except any pertinent positives listed.     Home Medications:  furosemide 20 mg oral tablet: 1 orally once a day (2023 00:52)  metOLazone 2.5 mg oral tablet: 1 orally 2 times a week (2023 00:52)    Allergy Status Unknown    PAST MEDICAL & SURGICAL HISTORY:  H/O atrial tachycardia  Osteoarthritis  Hypertension  Aortic stenosis  No significant past surgical history    FAMILY HISTORY:  No pertinent family history in first degree relatives    SOCIAL HISTORY: Denies cigarette use, alcohol use, or illicit drug use    Vital Signs Last 24 Hrs  T(F): 98.2 (2023 12:18), Max: 99.4 (2023 21:00)  HR: 77 (2023 12:18) (72 - 80)  BP: 90/44 (2023 12:18) (87/51 - 103/55)  RR: 18 (2023 12:18) (18 - 18)    General Appearance - AAOx3, NAD, morbidly obese   Heart - S1S2 regular rate and rhythm  Lung - CTA Bilaterally  Abdomen - Soft, nontender, nondistended, no rebound, no rigidity, no guarding, bowel sounds present    GYN/Pelvis:   External: normal female genitalia  Internal: 15cc red blood in the vagina with 2 dime-sized clots, no significant brisk bleeding noted from cervix. Difficulty to visualize cervix due to patient positioning and body habitus. On bimanual exam, cervix felt irregular and soft.   Uterus not palpable due to body habitus.  Adnexa: not palpable      LABS:                        8.3    6.93  )-----------( 395      ( 2023 07:04 )             28.7             138  |  101  |  20  ----------------------------<  100<H>  3.8   |  29  |  0.8    Ca    7.4<L>      2023 07:04  Mg     1.9         TPro  5.2<L>  /  Alb  2.2<L>  /  TBili  0.5  /  DBili  x   /  AST  28  /  ALT  13  /  AlkPhos  79      Culture - Urine (collected 23 @ 20:26)  Source: Clean Catch Clean Catch (Midstream)  Preliminary Report (23 @ 10:57):    >100,000 CFU/ml Escherichia coli    50,000 - 99,000 CFU/mL Enterococcus faecalis  Organism: Escherichia coli (23 @ 22:17)  Organism: Escherichia coli (23 @ 22:17)      Method Type: TED      -  Amikacin: S <=16      -  Amoxicillin/Clavulanic Acid: S <=8/4      -  Ampicillin: S <=8 These ampicillin results predict results for amoxicillin      -  Ampicillin/Sulbactam: S <=4/2 Enterobacter, Klebsiella aerogenes, Citrobacter, and Serratia may develop resistance during prolonged therapy (3-4 days)      -  Aztreonam: S <=4      -  Cefazolin: S <=2 For uncomplicated UTI with K. pneumoniae, E. coli, or P. mirablis: TED <=16 is sensitive and TED >=32 is resistant. This also predicts results for oral agents cefaclor, cefdinir, cefpodoxime, cefprozil, cefuroxime axetil, cephalexin and locarbef for uncomplicated UTI. Note that some isolates may be susceptible to these agents while testing resistant to cefazolin.      -  Cefepime: S <=2      -  Cefoxitin: S <=8      -  Ceftriaxone: S <=1 Enterobacter, Klebsiella aerogenes, Citrobacter, and Serratia may develop resistance during prolonged therapy      -  Cefuroxime: S <=4      -  Ciprofloxacin: S <=0.25      -  Ertapenem: S <=0.5      -  Gentamicin: S <=2      -  Imipenem: S <=1      -  Levofloxacin: S <=0.5      -  Meropenem: S <=1      -  Nitrofurantoin: S <=32 Should not be used to treat pyelonephritis      -  Piperacillin/Tazobactam: S <=8      -  Tobramycin: S <=2      -  Trimethoprim/Sulfamethoxazole: S <=0.5/9.5    RADIOLOGY & ADDITIONAL STUDIES:  < from: US Pelvis Complete (23 @ 18:03) >    ACC: [id number] EXAM:  US PELVIC COMPLETE   ORDERED BY: NICOLÁS PRICE     PROCEDURE DATE:  2023          INTERPRETATION:  CLINICAL INFORMATION: Vaginal bleeding, acute anemia    LMP: Postmenopausal    COMPARISON: None available.    TECHNIQUE:  Transabdominal pelvic sonogram only. Color and Spectral Doppler was   performed.    FINDINGS:    Uterus: 7.3 x 4.2 x 5.6 cm. Within normal limits.  Endometrium: 9 mm.    Right ovary: 2.0 x 1.1 x 1.7 cm. Within normal limits. Color Doppler flow   is demonstrated.  Left ovary: 2.0 x 1.1 x 2.0 cm Within normal limits. Color Doppler flow   is demonstrated.    Fluid: None.    Other: The upper vaginal canal is distended with fluid.    IMPRESSION:  The upper vaginal canal is distended with fluid. Clinical correlation is   advised.    Postmenopausal vaginal thickening to 9 mm. Differential includes   hyperplasia or malignancy. Recommend GYN assessment        CLARISSA HODGES MD; Attending Radiologist  This document has been electronically signed. 2023  8:03AM      PROCEDURE  R/B/A of procedure obtained, all questions answered, consent obtained. Pt placed in lithotomy position, speculum placed, cervix difficult to be visualized due to body habitus and sub-optimal positioning due to inpatient status, no clear cervical tissue to grasp. Decision made to proceed with blind biopsy. Cervix palpated on bimanual exam and noted to feel friable. Endometrial pipelle attempted to be passed through cervical os. Os noted to be stenotic and difficulty noted in passing pipelle. On removal of pipelle, 1cm of sough-off tissue removed. Additional attempts to pass pipelle x2 made, with limited tissue, presumably cervical. All tissue sent to pathology.     Patient tolerated procedure.

## 2023-04-25 NOTE — CONSULT NOTE ADULT - ASSESSMENT
A/P: 76yo P2 postmenopausal, PMHx of HTN, Aortic stenosis, atrial tachycardia, chronic venous stasis and OA BIBEMS admitted for LE swelling, now with postmenopausal bleeding    - attempted to do exam, but patient was not in the room at the time  - will complete the exam later this evening vs tomorrow AM    Dr. Interiano and Dr. Rain to be aware A/P: 76yo P2 postmenopausal, PMHx of HTN, Aortic stenosis, atrial tachycardia, chronic venous stasis and OA BIBEMS admitted for LE swelling, now with postmenopausal bleeding  -f/u endometrial biopsy and cervical tissue pathology  -recommendations pending results    Dr. Interiano and Dr. Loza aware A/P:  78yo P2 postmenopausal, 7l PMHx of HTN, Aortic stenosis, atrial tachycardia, chronic venous stasis and OA BIBEMS admitted for LE swelling, now with postmenopausal bleeding postmenopausal bleeding s/p EMB   -f/u endometrial biopsy and cervical tissue pathology  -recommendations pending results    Dr. Interiano and Dr. Loza aware

## 2023-04-25 NOTE — OCCUPATIONAL THERAPY INITIAL EVALUATION ADULT - ADDITIONAL COMMENTS
Pt reports lives in apartment on 4th floor with elevator access. Pt was independent with ADLS. Pt daughter would do the shopping. Pt would do minimal cooking. Pt hired cleaning lady.

## 2023-04-26 ENCOUNTER — RESULT REVIEW (OUTPATIENT)
Age: 78
End: 2023-04-26

## 2023-04-26 LAB
-  AMPICILLIN: SIGNIFICANT CHANGE UP
-  CIPROFLOXACIN: SIGNIFICANT CHANGE UP
-  LEVOFLOXACIN: SIGNIFICANT CHANGE UP
-  NITROFURANTOIN: SIGNIFICANT CHANGE UP
-  TETRACYCLINE: SIGNIFICANT CHANGE UP
-  VANCOMYCIN: SIGNIFICANT CHANGE UP
ALBUMIN SERPL ELPH-MCNC: 2.2 G/DL — LOW (ref 3.5–5.2)
ALP SERPL-CCNC: 96 U/L — SIGNIFICANT CHANGE UP (ref 30–115)
ALT FLD-CCNC: 16 U/L — SIGNIFICANT CHANGE UP (ref 0–41)
ANION GAP SERPL CALC-SCNC: 7 MMOL/L — SIGNIFICANT CHANGE UP (ref 7–14)
AST SERPL-CCNC: 36 U/L — SIGNIFICANT CHANGE UP (ref 0–41)
BASOPHILS # BLD AUTO: 0.04 K/UL — SIGNIFICANT CHANGE UP (ref 0–0.2)
BASOPHILS NFR BLD AUTO: 0.6 % — SIGNIFICANT CHANGE UP (ref 0–1)
BILIRUB SERPL-MCNC: 0.3 MG/DL — SIGNIFICANT CHANGE UP (ref 0.2–1.2)
BUN SERPL-MCNC: 17 MG/DL — SIGNIFICANT CHANGE UP (ref 10–20)
CALCIUM SERPL-MCNC: 7.8 MG/DL — LOW (ref 8.4–10.5)
CHLORIDE SERPL-SCNC: 101 MMOL/L — SIGNIFICANT CHANGE UP (ref 98–110)
CO2 SERPL-SCNC: 31 MMOL/L — SIGNIFICANT CHANGE UP (ref 17–32)
CREAT SERPL-MCNC: 0.7 MG/DL — SIGNIFICANT CHANGE UP (ref 0.7–1.5)
CULTURE RESULTS: SIGNIFICANT CHANGE UP
EGFR: 88 ML/MIN/1.73M2 — SIGNIFICANT CHANGE UP
EOSINOPHIL # BLD AUTO: 0.43 K/UL — SIGNIFICANT CHANGE UP (ref 0–0.7)
EOSINOPHIL NFR BLD AUTO: 6.4 % — SIGNIFICANT CHANGE UP (ref 0–8)
GLUCOSE SERPL-MCNC: 100 MG/DL — HIGH (ref 70–99)
HCT VFR BLD CALC: 29.9 % — LOW (ref 37–47)
HGB BLD-MCNC: 8.8 G/DL — LOW (ref 12–16)
IMM GRANULOCYTES NFR BLD AUTO: 0.9 % — HIGH (ref 0.1–0.3)
LYMPHOCYTES # BLD AUTO: 1.46 K/UL — SIGNIFICANT CHANGE UP (ref 1.2–3.4)
LYMPHOCYTES # BLD AUTO: 21.7 % — SIGNIFICANT CHANGE UP (ref 20.5–51.1)
MAGNESIUM SERPL-MCNC: 1.8 MG/DL — SIGNIFICANT CHANGE UP (ref 1.8–2.4)
MCHC RBC-ENTMCNC: 23.4 PG — LOW (ref 27–31)
MCHC RBC-ENTMCNC: 29.4 G/DL — LOW (ref 32–37)
MCV RBC AUTO: 79.5 FL — LOW (ref 81–99)
METHOD TYPE: SIGNIFICANT CHANGE UP
MONOCYTES # BLD AUTO: 0.85 K/UL — HIGH (ref 0.1–0.6)
MONOCYTES NFR BLD AUTO: 12.6 % — HIGH (ref 1.7–9.3)
MRSA PCR RESULT.: NEGATIVE — SIGNIFICANT CHANGE UP
NEUTROPHILS # BLD AUTO: 3.9 K/UL — SIGNIFICANT CHANGE UP (ref 1.4–6.5)
NEUTROPHILS NFR BLD AUTO: 57.8 % — SIGNIFICANT CHANGE UP (ref 42.2–75.2)
NRBC # BLD: 0 /100 WBCS — SIGNIFICANT CHANGE UP (ref 0–0)
ORGANISM # SPEC MICROSCOPIC CNT: SIGNIFICANT CHANGE UP
PLATELET # BLD AUTO: 437 K/UL — HIGH (ref 130–400)
PMV BLD: 9.8 FL — SIGNIFICANT CHANGE UP (ref 7.4–10.4)
POTASSIUM SERPL-MCNC: 3.8 MMOL/L — SIGNIFICANT CHANGE UP (ref 3.5–5)
POTASSIUM SERPL-SCNC: 3.8 MMOL/L — SIGNIFICANT CHANGE UP (ref 3.5–5)
PROT SERPL-MCNC: 5.5 G/DL — LOW (ref 6–8)
RBC # BLD: 3.76 M/UL — LOW (ref 4.2–5.4)
RBC # FLD: 21.5 % — HIGH (ref 11.5–14.5)
SODIUM SERPL-SCNC: 139 MMOL/L — SIGNIFICANT CHANGE UP (ref 135–146)
SPECIMEN SOURCE: SIGNIFICANT CHANGE UP
WBC # BLD: 6.74 K/UL — SIGNIFICANT CHANGE UP (ref 4.8–10.8)
WBC # FLD AUTO: 6.74 K/UL — SIGNIFICANT CHANGE UP (ref 4.8–10.8)

## 2023-04-26 PROCEDURE — 88341 IMHCHEM/IMCYTCHM EA ADD ANTB: CPT | Mod: 26

## 2023-04-26 PROCEDURE — 99222 1ST HOSP IP/OBS MODERATE 55: CPT

## 2023-04-26 PROCEDURE — 88305 TISSUE EXAM BY PATHOLOGIST: CPT | Mod: 26

## 2023-04-26 PROCEDURE — 74177 CT ABD & PELVIS W/CONTRAST: CPT | Mod: 26

## 2023-04-26 PROCEDURE — 88360 TUMOR IMMUNOHISTOCHEM/MANUAL: CPT | Mod: 26,59

## 2023-04-26 PROCEDURE — 88342 IMHCHEM/IMCYTCHM 1ST ANTB: CPT | Mod: 26

## 2023-04-26 PROCEDURE — 99233 SBSQ HOSP IP/OBS HIGH 50: CPT

## 2023-04-26 PROCEDURE — 76705 ECHO EXAM OF ABDOMEN: CPT | Mod: 26

## 2023-04-26 RX ORDER — IOHEXOL 300 MG/ML
30 INJECTION, SOLUTION INTRAVENOUS ONCE
Refills: 0 | Status: DISCONTINUED | OUTPATIENT
Start: 2023-04-26 | End: 2023-04-26

## 2023-04-26 RX ORDER — MAGNESIUM SULFATE 500 MG/ML
2 VIAL (ML) INJECTION ONCE
Refills: 0 | Status: COMPLETED | OUTPATIENT
Start: 2023-04-26 | End: 2023-04-26

## 2023-04-26 RX ORDER — DIATRIZOATE MEGLUMINE 180 MG/ML
30 INJECTION, SOLUTION INTRAVESICAL ONCE
Refills: 0 | Status: COMPLETED | OUTPATIENT
Start: 2023-04-26 | End: 2023-04-26

## 2023-04-26 RX ADMIN — Medication 12.5 MILLIGRAM(S): at 17:50

## 2023-04-26 RX ADMIN — DIATRIZOATE MEGLUMINE 30 MILLILITER(S): 180 INJECTION, SOLUTION INTRAVESICAL at 11:00

## 2023-04-26 RX ADMIN — Medication 25 GRAM(S): at 11:13

## 2023-04-26 RX ADMIN — Medication 40 MILLIGRAM(S): at 05:36

## 2023-04-26 RX ADMIN — IRON SUCROSE 110 MILLIGRAM(S): 20 INJECTION, SOLUTION INTRAVENOUS at 00:24

## 2023-04-26 RX ADMIN — ATORVASTATIN CALCIUM 40 MILLIGRAM(S): 80 TABLET, FILM COATED ORAL at 22:53

## 2023-04-26 RX ADMIN — Medication 12.5 MILLIGRAM(S): at 05:36

## 2023-04-26 RX ADMIN — PANTOPRAZOLE SODIUM 40 MILLIGRAM(S): 20 TABLET, DELAYED RELEASE ORAL at 05:38

## 2023-04-26 RX ADMIN — ENOXAPARIN SODIUM 40 MILLIGRAM(S): 100 INJECTION SUBCUTANEOUS at 00:24

## 2023-04-26 RX ADMIN — PANTOPRAZOLE SODIUM 40 MILLIGRAM(S): 20 TABLET, DELAYED RELEASE ORAL at 17:50

## 2023-04-26 NOTE — PROGRESS NOTE ADULT - SUBJECTIVE AND OBJECTIVE BOX
INTERVAL HISTORY: No overnight events.  	  MEDICATIONS:  atorvastatin 40 milliGRAM(s) Oral at bedtime  enoxaparin Injectable 40 milliGRAM(s) SubCutaneous every 24 hours  furosemide   Injectable 40 milliGRAM(s) IV Push daily  magnesium sulfate  IVPB 2 Gram(s) IV Intermittent once  metoprolol tartrate 12.5 milliGRAM(s) Oral two times a day  pantoprazole    Tablet 40 milliGRAM(s) Oral two times a day      REVIEW OF SYSTEMS:  CONSTITUTIONAL: No fever, weight loss, or fatigue  NECK: No pain or stiffness  RESPIRATORY: No cough, wheezing, chills or hemoptysis; No shortness of breath  CARDIOVASCULAR: No chest pain, palpitations, dizziness, or leg swelling  GASTROINTESTINAL: No abdominal or epigastric pain. No nausea, vomiting, or hematemesis; No diarrhea or constipation. No melena or hematochezia.  GENITOURINARY: No dysuria, frequency, hematuria, or incontinence  NEUROLOGICAL: No headaches, memory loss, loss of strength, numbness, or tremors  SKIN: No itching, burning, rashes, or lesions   ENDOCRINE: No heat or cold intolerance; No hair loss  MUSCULOSKELETAL: No joint pain or swelling; No muscle, back, or extremity pain  HEME/LYMPH: No easy bruising, or bleeding gums    PHYSICAL EXAM:  T(C): 36.4 (04-26-23 @ 04:47), Max: 37.2 (04-25-23 @ 21:00)  HR: 71 (04-26-23 @ 04:47) (71 - 81)  BP: 109/58 (04-26-23 @ 04:47) (90/44 - 109/58)  RR: 18 (04-25-23 @ 21:53) (18 - 18)  SpO2: 95% (04-26-23 @ 08:02) (94% - 95%)  Wt(kg): --  I&O's Summary    25 Apr 2023 07:01  -  26 Apr 2023 07:00  --------------------------------------------------------  IN: 847 mL / OUT: 1650 mL / NET: -803 mL    26 Apr 2023 07:01  -  26 Apr 2023 11:09  --------------------------------------------------------  IN: 286 mL / OUT: 700 mL / NET: -414 mL          GENERAL: NAD, well-groomed, well-developed  HEAD:  Atraumatic, Normocephalic  NECK: Supple, No JVD, Normal thyroid  NERVOUS SYSTEM:  Alert & Oriented X3, Good concentration  CHEST/LUNG: Clear to percussion bilaterally; No rales, rhonchi, wheezing, or rubs  HEART: Regular rate and rhythm; No murmurs, rubs, or gallops  ABDOMEN: Soft, Nontender, Nondistended; Bowel sounds present  EXTREMITIES:  2+ Peripheral Pulses, No clubbing, cyanosis, or edema  SKIN: No rashes or lesions    LABS:	 	    CARDIAC MARKERS ( 24 Apr 2023 12:50 )  x     / 0.46 ng/mL / x     / x     / 1.4 ng/mL                            8.8    6.74  )-----------( 437      ( 26 Apr 2023 06:15 )             29.9     04-26    139  |  101  |  17  ----------------------------<  100<H>  3.8   |  31  |  0.7    Ca    7.8<L>      26 Apr 2023 06:15  Mg     1.8     04-26    TPro  5.5<L>  /  Alb  2.2<L>  /  TBili  0.3  /  DBili  x   /  AST  36  /  ALT  16  /  AlkPhos  96  04-26    proBNP:   Lipid Profile:

## 2023-04-26 NOTE — PROGRESS NOTE ADULT - ASSESSMENT
77 y.o Female with PMHx of HTN, Aortic stenosis, atrial tachycardia, chronic venous stasis and OA BIBEMS for chief complaint of fall. She was sent in by her doctor for evaluation of her leg swelling. She fell while sliding to the side of her couch and landed on her buttock.     Acute HFpEF due to critical AS  Critical AS  S/P Fall.   NSTEMI likely type 2  Vaginal bleeding  Iron deficiency anemia  HTN  Chronic venous stasis.             PLAN:    ·	Cont tele  ·	CTA abd/pelvis reviewed. Showed GB air, multiple enlarged lymph nodes retroperitoneal and b/l iliac/inguinal. Wall thickening of the rectosigmoid. Pancreatic cysts. Will do CT abd/pelvis with po and oral contrast as per general surgery recommendation.   ·	GI eval   ·	Troponin trending up but CKMB is normal. Likely due NSTEMI type 2  ·	Cont Lasix 40 mg ivp daily  ·	Check i's and o's and daily wt  ·	Low salt diet and water restriction to 1.5 L/D  ·	ECHO reviewed. EF is 75%. Critical AS. Severe pulmonary HTN. IVC dilated.   ·	Structural heart disease eval  ·	Transvaginal US reviewed. The upper vaginal canal is distended with fluid. Postmenopausal vaginal thickening to 9 mm  ·	Vaginal bleeding; OBGYN eval.  ·	S/P one unit of PRBC'S  ·	Monitor H/H  ·	Cont IV Venofer 200 mg iv daily x 5 doses.   ·	CTS eval noted. Will need AVR  ·	TAVR protocol CT. Will also need cardiac cath.     Progress Note Handoff    Pending (specify):  Consults__OBGYN, Structural heart disease consult_______, Tests_______, Test Results_______, Other_________  Family discussion:  Disposition: Home___/SNF___/Other________/Unknown at this time________    Carloz Cheatham MD  Spectra: [phone]

## 2023-04-26 NOTE — CONSULT NOTE ADULT - SUBJECTIVE AND OBJECTIVE BOX
GENERAL SURGERY CONSULT NOTE    Patient: JESÚS TERRELL , 77y ([birth year])Female   MRN: [id number]  Location: [facility], [place] 3C (Back) [address]  Visit: 04-20-23 Inpatient  Date: 04-26-23 @ 00:44    HPI:  77 y.o Female with PMHx of HTN, Aortic stenosis, atrial tachycardia, chronic venous stasis and OA BIBEMS for chief complaint of fall. Patient states she is here because her doctor told her to come in for evaluation of her legs which she states have been swelling up. She did fall earlier today but she was sliding to the side of her couch and landed on her buttock. She states she has been experiencing difficulty walking because of her legs being swollen and she feels a burning pain in her left ankle. Her legs are seeping fluid, and has bilateral ulcers. Denies any pain, head injury or being on any blood thinners. Pt denies any urinary or bowel incontinence , back or neck pain, numbness, tingling or focal weakness.    Patient found to have critical aortic stenosis and was admitted to medicine for TAVR workup.   Surgery consulted for incidental finding of pneumobilia on CTA chest. Patient seen at the bedside. Patient reports tolerating regular diet with no nausea or vomiting. Additionally denies fevers/chills. Patient has been having regular bowel movements with flatus, and reports no abdominal pain/discomfort at this time.       PAST MEDICAL & SURGICAL HISTORY:  H/O atrial tachycardia  Osteoarthritis  Hypertension  Aortic stenosis    No significant past surgical history    Home Medications:  furosemide 20 mg oral tablet: 1 orally once a day (21 Apr 2023 00:52)  metOLazone 2.5 mg oral tablet: 1 orally 2 times a week (21 Apr 2023 00:52)    VITALS:  T(F): 98.9 (04-25-23 @ 21:00), Max: 98.9 (04-25-23 @ 21:00)  HR: 76 (04-25-23 @ 21:53) (76 - 81)  BP: 106/54 (04-25-23 @ 21:00) (90/44 - 106/54)  RR: 18 (04-25-23 @ 21:53) (18 - 18)  SpO2: 94% (04-25-23 @ 21:53) (94% - 94%)    PHYSICAL EXAM:  General: NAD, AAOx3, calm and cooperative  HEENT: NCAT, MAGDALENE, EOMI, Trachea ML, Neck supple  Cardiac: RRR   Respiratory: CTAB, normal respiratory effort  Abdomen: Soft, non-distended, non-tender, no rebound, no guarding. +BS.  Musculoskeletal: Strength 5/5 BL UE/LE, ROM intact, compartments soft  Neuro: Sensation grossly intact and equal throughout, no focal deficits  Skin: Warm/dry, normal color, no jaundice    MEDICATIONS  (STANDING):  aspirin  chewable 81 milliGRAM(s) Oral daily  atorvastatin 40 milliGRAM(s) Oral at bedtime  enoxaparin Injectable 40 milliGRAM(s) SubCutaneous every 24 hours  furosemide   Injectable 40 milliGRAM(s) IV Push daily  metoprolol tartrate 12.5 milliGRAM(s) Oral two times a day  pantoprazole    Tablet 40 milliGRAM(s) Oral two times a day    MEDICATIONS  (PRN):    LAB/STUDIES:                        8.3    6.93  )-----------( 395      ( 25 Apr 2023 07:04 )             28.7     04-25    138  |  101  |  20  ----------------------------<  100<H>  3.8   |  29  |  0.8    Ca    7.4<L>      25 Apr 2023 07:04  Mg     1.9     04-25    TPro  5.2<L>  /  Alb  2.2<L>  /  TBili  0.5  /  DBili  x   /  AST  28  /  ALT  13  /  AlkPhos  79  04-25    LIVER FUNCTIONS - ( 25 Apr 2023 07:04 )  Alb: 2.2 g/dL / Pro: 5.2 g/dL / ALK PHOS: 79 U/L / ALT: 13 U/L / AST: 28 U/L / GGT: x           CARDIAC MARKERS ( 24 Apr 2023 12:50 )  x     / 0.46 ng/mL / x     / x     / 1.4 ng/mL    IMAGING:  < from: CT Angio Abdomen and Pelvis TAVR NAYELI w/wo IV Cont (04.25.23 @ 10:07) >    HEPATOBILIARY: Diffuse hypoattenuation of the liver which may be seen   with hepatic steatosis.. Gallbladder air. Pneumobilia    1.  Annulus area is 419 mm2, bi-plane diameter of elliptical cross   section 26 x 21 mm and annulus perimeter is 76 mm.  2. The smallest vessel diameter in the pelvis is 9 mm on the right and 8   mm on the left.  3. The smallest vessel diameter in the axilla is 6 mm on the right and 6   mm on the left.  4. Gallbladder air.. Pneumobilia. Correlate for prior instrumentation  5. Multiple subcentimeter and enlarged retroperitoneal and bilateral   iliac/inguinal lymph nodes. Findings suspicious for neoplastic process.   Further evaluation recommended.  6. Nonspecific wall thickening of the rectosigmoid.  7. 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 Dilated main pulmonary artery.  9. Enlarged and heterogeneous thyroid gland with multiple hypodense   nodules  10. Pancreatic cysts. Recommend MR abdomen with and without contrast for   complete characterization.

## 2023-04-26 NOTE — CONSULT NOTE ADULT - ASSESSMENT
77F with PMH of HTN, aortic stenosis, atrial tachycardia, chronic venous stasis and OA BIBEMS for chief complaint of fall. Patient states she is here because her doctor told her to come in for evaluation of her legs which she states have been swelling up. ECHO was obtained revealing mean transaortic valve gradient of 74 mmHg, HARRIET 0.63cm^2, vmax 5.49 m/s. Patient additionally found to have possible pneumobilia on CTA chest for TAVR workup.     PLAN:  #Pneumobilia   - Patient had benign abdominal exam and is otherwise asymptomatic (tolerating regular diet, having regular bowel function)   - Recommend RUQ US and CT abd/pelvis with oral/IV contrast to further characterize gallbladder/pneumobilia   - Monitor BM/F   - Surgery will follow    Discussed plan with attending surgeon: Dr. Ny  Trauma/ACS  SPECTRA [id number]

## 2023-04-26 NOTE — CONSULT NOTE ADULT - CONSULT REQUESTED DATE/TIME
22-Apr-2023
22-Apr-2023 09:26
25-Apr-2023 16:14
26-Apr-2023 00:44
20-Apr-2023 17:52
25-Apr-2023 17:50
21-Apr-2023

## 2023-04-26 NOTE — CONSULT NOTE ADULT - ATTENDING COMMENTS
Anemia without overt GI bleeding, Obtain BARON work up, transfuse as needed (to above 7 or 8 if develops bleeding or a ACS). Endoscopic work up was recommended, can pursue OP.
patient seen and evaluated. has no abdominal complaint. here for evaluation of aortic valve. vitals stable, abdomen soft, not distended, not tender. not sure whether the air noted on CT is in the GB or duodenum. if concerns persist, repeat CT of abdomen and pelvis is needed to better evaluate it.

## 2023-04-26 NOTE — CONSULT NOTE ADULT - CONSULT REASON
fall
anemia
vaginal bleeding
aortic stenosis
Chronic Venous Stasis, BLE Swelling
Dental clearance
Pneumobilia
yes

## 2023-04-26 NOTE — PROGRESS NOTE ADULT - ASSESSMENT
77 y.o Female with PMHx of HTN, Severe Aortic stenosis, atrial tachycardia, chronic venous stasis and OA BIBEMS for chief complaint of fall, having pre-op workup for TAVR.    # Severe AS   - TAVR will be done as outpatient, but pre-op will be performed in patient:   - CT angio Abdomen and pelvis TAVR w and CT angio Chest TAVR -->  < from: CT Angio Abdomen and Pelvis TAVR NAYELI w/wo IV Cont (04.25.23 @ 10:07) >  1.  Annulus area is 419 mm2, bi-plane diameter of elliptical cross   section 26 x 21 mm and annulus perimeter is 76 mm.  2. The smallest vessel diameter in the pelvis is 9 mm on the right and 8   mm on the left.  3. The smallest vessel diameter in the axilla is 6 mm on the right and 6   mm on the left.  4. Gallbladder air.. Pneumobilia. Correlate for prior instrumentation  5. Multiple subcentimeter and enlarged retroperitoneal and bilateral   iliac/inguinal lymph nodes. Findings suspicious for neoplastic process.   Further evaluation recommended.  6. Nonspecific wall thickening of the rectosigmoid.  7. 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 Dilated main pulmonary artery.  9. Enlarged and heterogeneous thyroid gland with multiple hypodense   nodules  10. Pancreatic cysts. Recommend MR abdomen with and without contrast for   complete characterization.    < end of copied text >    - Carotid duplex: 20-39% stenosis in R internal carotid and 40-59% stenosis in left internal carotid (prelim read)  - cardiac cath --> contact cardiology     #Pneumobilia  - found incidentally on CT scan  - no abdominal pain, soft abdomen on exam  - surgery consulted    #Rectosigmoid thickening   #Microcitic anemia   # Chronic venous stasis   - Bilateral LE edema  - Takes Furosemide and metolazone   - Cont Furosemide IV 40mg qd  - Vascular and recommended no acute intervention   - Elevate Legs and ACE wrap them     # Acute Microcytic anemia   - s/p 1 unit PRBC, H/Hl stable around ~ 7.5-8  - GI evaluation appreciated, pt refusing EGD/C-scope for now, c/w PPI  - patient has history of bleeding per vagina, on and off for ?1 year, last episode last night  - GYN consult --> f/u  - TVUS: postmenopausal vaginal thickening 9mm    # Elevated Troponins   - Asymptomatic, no chest pain  - ECG: no ischemic changes   - c/w aspirin, statin and metoprolol  - will need LHC prior to AVR once anemia w/up completed, f/u with Dr Walker      #MISC  -DVT: lovenox sq   -GI: PPI   -Diet: DASH  -Activity: IAT   -Code status: Full code        77 y.o Female with PMHx of HTN, Severe Aortic stenosis, atrial tachycardia, chronic venous stasis and OA BIBEMS for chief complaint of fall, having pre-op workup for TAVR.    # Severe AS   - TAVR will be done as outpatient, but pre-op will be performed in patient:   - CT angio Abdomen and pelvis TAVR w and CT angio Chest TAVR -->  < from: CT Angio Abdomen and Pelvis TAVR NAYELI w/wo IV Cont (04.25.23 @ 10:07) >  1.  Annulus area is 419 mm2, bi-plane diameter of elliptical cross   section 26 x 21 mm and annulus perimeter is 76 mm.  2. The smallest vessel diameter in the pelvis is 9 mm on the right and 8   mm on the left.  3. The smallest vessel diameter in the axilla is 6 mm on the right and 6   mm on the left.  4. Gallbladder air.. Pneumobilia. Correlate for prior instrumentation  5. Multiple subcentimeter and enlarged retroperitoneal and bilateral   iliac/inguinal lymph nodes. Findings suspicious for neoplastic process.   Further evaluation recommended.  6. Nonspecific wall thickening of the rectosigmoid.  7. 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 Dilated main pulmonary artery.  9. Enlarged and heterogeneous thyroid gland with multiple hypodense   nodules  10. Pancreatic cysts. Recommend MR abdomen with and without contrast for   complete characterization.    < end of copied text >    - Carotid duplex: 20-39% stenosis in R internal carotid and 40-59% stenosis in left internal carotid (prelim read)  - cardiac cath --> contact cardiology     #Pneumobilia  - found incidentally on CT scan  - no abdominal pain, soft abdomen on exam  - surgery consulted -->   - Recommended CT abd + pelvis with PO & IV contrast, RUQ ultrasound    # Chronic venous stasis   - Bilateral LE edema  - Takes Furosemide and metolazone   - Cont Furosemide IV 40mg qd  - Vascular and recommended no acute intervention   - Elevate Legs and ACE wrap them     # Acute Microcytic anemi  #Rectosigmoid thickening   - s/p 1 unit PRBC, H/Hl stable around ~ 7.5-8  - GI evaluation appreciated, pt refusing EGD/C-scope for now, c/w PPI. spoke to patient again on 4/26 after CT findings of rectosigmoid thickening, said might consider colonoscopy if necessary prior to TAVR. Will continue to discuss as well as GOC   - patient has history of bleeding per vagina, on and off for ?1 year, last episode last night  - GYN consult --> f/u  - TVUS: postmenopausal vaginal thickening 9mm    # Elevated Troponins   - Asymptomatic, no chest pain  - ECG: no ischemic changes   - c/w aspirin, statin and metoprolol  - will need LHC prior to AVR once anemia w/up completed, f/u with Dr Walker      #MISC  -DVT: lovenox sq   -GI: PPI   -Diet: DASH  -Activity: IAT   -Code status: Full code (rediscussed with patient on 4/26)

## 2023-04-26 NOTE — PROGRESS NOTE ADULT - SUBJECTIVE AND OBJECTIVE BOX
JESÚS TERRELL  77y Female    CHIEF COMPLAINT:    Patient is a 77y old  Female who presents with a chief complaint of Fall (26 Apr 2023 11:08)      INTERVAL HPI/OVERNIGHT EVENTS:    Patient seen and examined. Again having vaginal bleeding. Denies sob. No cp    ROS: All other systems are negative.    Vital Signs:    T(F): 97.5 (04-26-23 @ 04:47), Max: 98.9 (04-25-23 @ 21:00)  HR: 71 (04-26-23 @ 04:47) (71 - 81)  BP: 109/58 (04-26-23 @ 04:47) (106/54 - 109/58)  RR: 18 (04-25-23 @ 21:53) (18 - 18)  SpO2: 95% (04-26-23 @ 08:02) (94% - 95%)  I&O's Summary    25 Apr 2023 07:01  -  26 Apr 2023 07:00  --------------------------------------------------------  IN: 847 mL / OUT: 1650 mL / NET: -803 mL    26 Apr 2023 07:01  -  26 Apr 2023 12:30  --------------------------------------------------------  IN: 286 mL / OUT: 700 mL / NET: -414 mL      Daily     Daily   CAPILLARY BLOOD GLUCOSE          PHYSICAL EXAM:    GENERAL:  NAD  SKIN: No rashes or lesions  HENT: Atraumatic. Normocephalic. PERRL. Moist membranes.  NECK: Supple, No JVD. No lymphadenopathy.  PULMONARY: CTA B/L. No wheezing. No rales  CVS: Normal S1, S2. Rate and Rhythm are regular. A IV/VI systolic mm over aortic and LPSB.   ABDOMEN/GI: Soft, Nontender, Nondistended; BS present  EXTREMITIES: Peripheral pulses intact. No edema B/L LE.  NEUROLOGIC:  No motor or sensory deficit.  PSYCH: Alert & oriented x 3    Consultant(s) Notes Reviewed:  [x ] YES  [ ] NO  Care Discussed with Consultants/Other Providers [ x] YES  [ ] NO    EKG reviewed  Telemetry reviewed    LABS:                        8.8    6.74  )-----------( 437      ( 26 Apr 2023 06:15 )             29.9   Hemoglobin: 8.8 g/dL (04-26 @ 06:15)  Hemoglobin: 8.3 g/dL (04-25 @ 07:04)  Hemoglobin: 7.8 g/dL (04-24 @ 06:04)  Hemoglobin: 7.9 g/dL (04-23 @ 05:08)  Hemoglobin: 8.1 g/dL (04-22 @ 17:19)  Hemoglobin: 7.0 g/dL (04-22 @ 04:47)    04-26    139  |  101  |  17  ----------------------------<  100<H>  3.8   |  31  |  0.7    Ca    7.8<L>      26 Apr 2023 06:15  Mg     1.8     04-26    TPro  5.5<L>  /  Alb  2.2<L>  /  TBili  0.3  /  DBili  x   /  AST  36  /  ALT  16  /  AlkPhos  96  04-26        Trop 0.46, CKMB 1.4, CK --, 04-24-23 @ 12:50        RADIOLOGY & ADDITIONAL TESTS:    < from: CT Angio Abdomen and Pelvis TAVR NAYELI w/wo IV Cont (04.25.23 @ 10:07) >    IMPRESSION:    1.  Annulus area is 419 mm2, bi-plane diameter of elliptical cross   section 26 x 21 mm and annulus perimeter is 76 mm.  2. The smallest vessel diameter in the pelvis is 9 mm on the right and 8   mm on the left.  3. The smallest vessel diameter in the axilla is 6 mm on the right and 6   mm on the left.  4. Gallbladder air.. Pneumobilia. Correlate for prior instrumentation  5. Multiple subcentimeter and enlarged retroperitoneal and bilateral   iliac/inguinal lymph nodes. Findings suspicious for neoplastic process.   Further evaluation recommended.  6. Nonspecific wall thickening of the rectosigmoid.  7. 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 Dilated main pulmonary artery.  9. Enlarged and heterogeneous thyroid gland with multiple hypodense   nodules  10. Pancreatic cysts. Recommend MR abdomen with and without contrast for   complete characterization.    Callback request submitted    < end of copied text >    Imaging or report Personally Reviewed:  [x ] YES  [ ] NO    Medications:  Standing  atorvastatin 40 milliGRAM(s) Oral at bedtime  enoxaparin Injectable 40 milliGRAM(s) SubCutaneous every 24 hours  furosemide   Injectable 40 milliGRAM(s) IV Push daily  metoprolol tartrate 12.5 milliGRAM(s) Oral two times a day  pantoprazole    Tablet 40 milliGRAM(s) Oral two times a day    PRN Meds      Case discussed with resident    Care discussed with pt/family

## 2023-04-26 NOTE — PROGRESS NOTE ADULT - SUBJECTIVE AND OBJECTIVE BOX
JESÚS BAKER 77y Female  MRN#: [id number]     Hospital Day: 6d    Pt is currently admitted with the primary diagnosis of  Localized edema        SUBJECTIVE     Patient was seen this morning. Denies any complaints.                                             ----------------------------------------------------------  OBJECTIVE  PAST MEDICAL & SURGICAL HISTORY  H/O atrial tachycardia    Osteoarthritis    Hypertension    Aortic stenosis    No significant past surgical history                                              -----------------------------------------------------------  ALLERGIES:  Allergy Status Unknown                                            ------------------------------------------------------------    HOME MEDICATIONS  Home Medications:  furosemide 20 mg oral tablet: 1 orally once a day (21 Apr 2023 00:52)  metOLazone 2.5 mg oral tablet: 1 orally 2 times a week (21 Apr 2023 00:52)                           MEDICATIONS:  STANDING MEDICATIONS  atorvastatin 40 milliGRAM(s) Oral at bedtime  diatrizoate meglumine/diatrizoate sodium. 30 milliLiter(s) Oral once  enoxaparin Injectable 40 milliGRAM(s) SubCutaneous every 24 hours  furosemide   Injectable 40 milliGRAM(s) IV Push daily  magnesium sulfate  IVPB 2 Gram(s) IV Intermittent once  metoprolol tartrate 12.5 milliGRAM(s) Oral two times a day  pantoprazole    Tablet 40 milliGRAM(s) Oral two times a day    PRN MEDICATIONS                                            ------------------------------------------------------------  VITAL SIGNS: Last 24 Hours  T(C): 36.4 (26 Apr 2023 04:47), Max: 37.2 (25 Apr 2023 21:00)  T(F): 97.5 (26 Apr 2023 04:47), Max: 98.9 (25 Apr 2023 21:00)  HR: 71 (26 Apr 2023 04:47) (71 - 81)  BP: 109/58 (26 Apr 2023 04:47) (90/44 - 109/58)  BP(mean): --  RR: 18 (25 Apr 2023 21:53) (18 - 18)  SpO2: 95% (26 Apr 2023 08:02) (94% - 95%)      04-25-23 @ 07:01  -  04-26-23 @ 07:00  --------------------------------------------------------  IN: 847 mL / OUT: 1650 mL / NET: -803 mL                                             --------------------------------------------------------------  LABS:                        8.8    6.74  )-----------( 437      ( 26 Apr 2023 06:15 )             29.9     04-26    139  |  101  |  17  ----------------------------<  100<H>  3.8   |  31  |  0.7    Ca    7.8<L>      26 Apr 2023 06:15  Mg     1.8     04-26    TPro  5.5<L>  /  Alb  2.2<L>  /  TBili  0.3  /  DBili  x   /  AST  36  /  ALT  16  /  AlkPhos  96  04-26          CARDIAC MARKERS ( 24 Apr 2023 12:50 )  x     / 0.46 ng/mL / x     / x     / 1.4 ng/mL                                                --------------------------------------------------------------    PHYSICAL EXAM:  GENERAL: NAD, lying in bed comfortably  NERVOUS SYSTEM:  Alert & Oriented X3   CHEST/LUNG: Clear to auscultation bilaterally. No wheezes heard  HEART: regular rate and rhythm; No murmurs heard  ABDOMEN: Soft, Nontender, Nondistended,   EXTREMITIES: No edema. No clubbing.                                          --------------------------------------------------------------

## 2023-04-27 LAB
A1C WITH ESTIMATED AVERAGE GLUCOSE RESULT: 5 % — SIGNIFICANT CHANGE UP (ref 4–5.6)
ALBUMIN SERPL ELPH-MCNC: 2.1 G/DL — LOW (ref 3.5–5.2)
ALP SERPL-CCNC: 104 U/L — SIGNIFICANT CHANGE UP (ref 30–115)
ALT FLD-CCNC: 17 U/L — SIGNIFICANT CHANGE UP (ref 0–41)
ANION GAP SERPL CALC-SCNC: 8 MMOL/L — SIGNIFICANT CHANGE UP (ref 7–14)
AST SERPL-CCNC: 32 U/L — SIGNIFICANT CHANGE UP (ref 0–41)
BASOPHILS # BLD AUTO: 0.04 K/UL — SIGNIFICANT CHANGE UP (ref 0–0.2)
BASOPHILS NFR BLD AUTO: 0.6 % — SIGNIFICANT CHANGE UP (ref 0–1)
BILIRUB SERPL-MCNC: 0.4 MG/DL — SIGNIFICANT CHANGE UP (ref 0.2–1.2)
BUN SERPL-MCNC: 16 MG/DL — SIGNIFICANT CHANGE UP (ref 10–20)
C DIFF BY PCR RESULT: SIGNIFICANT CHANGE UP
CALCIUM SERPL-MCNC: 7.6 MG/DL — LOW (ref 8.4–10.5)
CHLORIDE SERPL-SCNC: 101 MMOL/L — SIGNIFICANT CHANGE UP (ref 98–110)
CO2 SERPL-SCNC: 30 MMOL/L — SIGNIFICANT CHANGE UP (ref 17–32)
CREAT SERPL-MCNC: 0.7 MG/DL — SIGNIFICANT CHANGE UP (ref 0.7–1.5)
EGFR: 88 ML/MIN/1.73M2 — SIGNIFICANT CHANGE UP
EOSINOPHIL # BLD AUTO: 0.28 K/UL — SIGNIFICANT CHANGE UP (ref 0–0.7)
EOSINOPHIL NFR BLD AUTO: 4.2 % — SIGNIFICANT CHANGE UP (ref 0–8)
ESTIMATED AVERAGE GLUCOSE: 97 MG/DL — SIGNIFICANT CHANGE UP (ref 68–114)
GLUCOSE SERPL-MCNC: 85 MG/DL — SIGNIFICANT CHANGE UP (ref 70–99)
HCT VFR BLD CALC: 28.9 % — LOW (ref 37–47)
HGB BLD-MCNC: 8.5 G/DL — LOW (ref 12–16)
IMM GRANULOCYTES NFR BLD AUTO: 0.9 % — HIGH (ref 0.1–0.3)
LYMPHOCYTES # BLD AUTO: 1.79 K/UL — SIGNIFICANT CHANGE UP (ref 1.2–3.4)
LYMPHOCYTES # BLD AUTO: 26.6 % — SIGNIFICANT CHANGE UP (ref 20.5–51.1)
MAGNESIUM SERPL-MCNC: 2.1 MG/DL — SIGNIFICANT CHANGE UP (ref 1.8–2.4)
MCHC RBC-ENTMCNC: 23.7 PG — LOW (ref 27–31)
MCHC RBC-ENTMCNC: 29.4 G/DL — LOW (ref 32–37)
MCV RBC AUTO: 80.7 FL — LOW (ref 81–99)
MONOCYTES # BLD AUTO: 0.84 K/UL — HIGH (ref 0.1–0.6)
MONOCYTES NFR BLD AUTO: 12.5 % — HIGH (ref 1.7–9.3)
NEUTROPHILS # BLD AUTO: 3.71 K/UL — SIGNIFICANT CHANGE UP (ref 1.4–6.5)
NEUTROPHILS NFR BLD AUTO: 55.2 % — SIGNIFICANT CHANGE UP (ref 42.2–75.2)
NRBC # BLD: 0 /100 WBCS — SIGNIFICANT CHANGE UP (ref 0–0)
PLATELET # BLD AUTO: 447 K/UL — HIGH (ref 130–400)
PMV BLD: 10.2 FL — SIGNIFICANT CHANGE UP (ref 7.4–10.4)
POTASSIUM SERPL-MCNC: 4.5 MMOL/L — SIGNIFICANT CHANGE UP (ref 3.5–5)
POTASSIUM SERPL-SCNC: 4.5 MMOL/L — SIGNIFICANT CHANGE UP (ref 3.5–5)
PROT SERPL-MCNC: 5.3 G/DL — LOW (ref 6–8)
RBC # BLD: 3.58 M/UL — LOW (ref 4.2–5.4)
RBC # FLD: 22.1 % — HIGH (ref 11.5–14.5)
SODIUM SERPL-SCNC: 139 MMOL/L — SIGNIFICANT CHANGE UP (ref 135–146)
WBC # BLD: 6.72 K/UL — SIGNIFICANT CHANGE UP (ref 4.8–10.8)
WBC # FLD AUTO: 6.72 K/UL — SIGNIFICANT CHANGE UP (ref 4.8–10.8)

## 2023-04-27 PROCEDURE — 99233 SBSQ HOSP IP/OBS HIGH 50: CPT

## 2023-04-27 RX ORDER — CHLORHEXIDINE GLUCONATE 213 G/1000ML
1 SOLUTION TOPICAL
Refills: 0 | Status: DISCONTINUED | OUTPATIENT
Start: 2023-04-27 | End: 2023-04-28

## 2023-04-27 RX ADMIN — PANTOPRAZOLE SODIUM 40 MILLIGRAM(S): 20 TABLET, DELAYED RELEASE ORAL at 05:36

## 2023-04-27 RX ADMIN — CHLORHEXIDINE GLUCONATE 1 APPLICATION(S): 213 SOLUTION TOPICAL at 05:38

## 2023-04-27 RX ADMIN — Medication 12.5 MILLIGRAM(S): at 05:36

## 2023-04-27 RX ADMIN — Medication 12.5 MILLIGRAM(S): at 18:05

## 2023-04-27 RX ADMIN — Medication 40 MILLIGRAM(S): at 05:36

## 2023-04-27 RX ADMIN — ENOXAPARIN SODIUM 40 MILLIGRAM(S): 100 INJECTION SUBCUTANEOUS at 00:34

## 2023-04-27 RX ADMIN — ATORVASTATIN CALCIUM 40 MILLIGRAM(S): 80 TABLET, FILM COATED ORAL at 21:12

## 2023-04-27 RX ADMIN — PANTOPRAZOLE SODIUM 40 MILLIGRAM(S): 20 TABLET, DELAYED RELEASE ORAL at 18:05

## 2023-04-27 NOTE — PROGRESS NOTE ADULT - ASSESSMENT
77 y.o Female with PMHx of HTN, Severe Aortic stenosis, atrial tachycardia, chronic venous stasis and OA BIBEMS for chief complaint of fall, having pre-op workup for TAVR.    # Severe AS   - TAVR will be done as outpatient, but pre-op will be performed in patient:   - CT angio Abdomen and pelvis TAVR w and CT angio Chest TAVR -->  < from: CT Angio Abdomen and Pelvis TAVR NAYEIL w/wo IV Cont (04.25.23 @ 10:07) >  1.  Annulus area is 419 mm2, bi-plane diameter of elliptical cross   section 26 x 21 mm and annulus perimeter is 76 mm.  2. The smallest vessel diameter in the pelvis is 9 mm on the right and 8   mm on the left.  3. The smallest vessel diameter in the axilla is 6 mm on the right and 6   mm on the left.  4. Gallbladder air.. Pneumobilia. Correlate for prior instrumentation  5. Multiple subcentimeter and enlarged retroperitoneal and bilateral   iliac/inguinal lymph nodes. Findings suspicious for neoplastic process.   Further evaluation recommended.  6. Nonspecific wall thickening of the rectosigmoid.  7. 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 Dilated main pulmonary artery.  9. Enlarged and heterogeneous thyroid gland with multiple hypodense nodules  10. Pancreatic cysts. Recommend MR abdomen with and without contrast for   complete characterization.    < end of copied text >  - Carotid duplex: 20-39% stenosis in R internal carotid and 40-59% stenosis in left internal carotid (prelim read)  - cardiac cath --> once more stable as per Dr. Walker     #Pneumobilia  - found incidentally on CT scan  - no abdominal pain, soft abdomen on exam  - surgery consulted, recommended RUQ U/S & CT abdomen with Po contrast  - CT abd + pelvis with PO & IV contrast:   1.  Gallbladder air and trace pneumobilia is again noted, of uncertain   etiology. No cholecystoenteric fistula is identified.  2.  There is a questionable nodular liver contour, raising the possibilityof early cirrhosis.  3.  There is blas-rectosigmoid inflammatory change suggesting proctocolitis.  4.  Anasarca is noted.  - RUQ ultrasound --> f/u     # Chronic venous stasis   - Bilateral LE edema  - Takes Furosemide and metolazone   - Cont Furosemide IV 40mg qd  - Vascular and recommended no acute intervention   - Elevate Legs and ACE wrap them     #Watery diarrhea started on 4/26  # Acute Microcytic anemia  #Rectosigmoid thickening   - s/p 1 unit PRBC, H/Hl stable around ~ 7.5-8  - GI evaluation , pt was refusing EGD/C-scope, c/w PPI.   - spoke to patient again on 4/26 after CT findings of rectosigmoid thickening, said she agrees to colonoscopy if necessary prior to TAVR --> f/u GI  - f/u C. diff (collected). recent Ab use.     #Vaginal Bleed  - patient has history of bleeding per vagina, still ongoing   - GYN consult --> planning for bedside endometrial biopsy? still not done  - TVUS: postmenopausal vaginal thickening 9mm    # Elevated Troponins   - Asymptomatic, no chest pain  - ECG: no ischemic changes   - c/w aspirin, statin and metoprolol  - will need LHC prior to AVR once more stable as per Dr. Walker      #MISC  -DVT: lovenox sq   -GI: PPI   -Diet: DASH  -Activity: IAT   -Code status: Full code (rediscussed with patient on 4/26)     77 y.o Female with PMHx of HTN, Severe Aortic stenosis, atrial tachycardia, chronic venous stasis and OA BIBEMS for chief complaint of fall, having pre-op workup for TAVR.    # Severe AS   - TAVR will be done as outpatient, but pre-op will be performed in patient:   - CT angio Abdomen and pelvis TAVR w and CT angio Chest TAVR -->  < from: CT Angio Abdomen and Pelvis TAVR NAYELI w/wo IV Cont (04.25.23 @ 10:07) >  1.  Annulus area is 419 mm2, bi-plane diameter of elliptical cross   section 26 x 21 mm and annulus perimeter is 76 mm.  2. The smallest vessel diameter in the pelvis is 9 mm on the right and 8   mm on the left.  3. The smallest vessel diameter in the axilla is 6 mm on the right and 6   mm on the left.  4. Gallbladder air.. Pneumobilia. Correlate for prior instrumentation  5. Multiple subcentimeter and enlarged retroperitoneal and bilateral   iliac/inguinal lymph nodes. Findings suspicious for neoplastic process.   Further evaluation recommended.  6. Nonspecific wall thickening of the rectosigmoid.  7. 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 Dilated main pulmonary artery.  9. Enlarged and heterogeneous thyroid gland with multiple hypodense nodules  10. Pancreatic cysts. Recommend MR abdomen with and without contrast for   complete characterization.    < end of copied text >  - Carotid duplex: 20-39% stenosis in R internal carotid and 40-59% stenosis in left internal carotid (prelim read)  - cardiac cath --> once more stable as per Dr. Walker     #Pneumobilia  - found incidentally on CT scan  - no abdominal pain, soft abdomen on exam  - surgery consulted, recommended RUQ U/S & CT abdomen with Po contrast  - CT abd + pelvis with PO & IV contrast:   1.  Gallbladder air and trace pneumobilia is again noted, of uncertain   etiology. No cholecystoenteric fistula is identified.  2.  There is a questionable nodular liver contour, raising the possibility of early cirrhosis.  3.  There is blas-rectosigmoid inflammatory change suggesting proctocolitis.  4.  Anasarca is noted.  - RUQ ultrasound --> f/u     # Chronic venous stasis   - Bilateral LE edema  - Takes Furosemide and metolazone   - Cont Furosemide IV 40mg qd  - Vascular and recommended no acute intervention   - Elevate Legs and ACE wrap them     #Watery diarrhea started on 4/26  # Acute Microcytic anemia  #Rectosigmoid thickening   - s/p 1 unit PRBC, H/Hl stable around ~ 7.5-8  - GI evaluation , pt was refusing EGD/C-scope, c/w PPI.   - spoke to patient again on 4/26 after CT findings of rectosigmoid thickening, said she agrees to colonoscopy if necessary prior to TAVR --> f/u GI  - f/u C. diff (collected). recent Ab use.     #Vaginal Bleed  - patient has history of bleeding per vagina, still ongoing   - GYN consult -->endometrial biopsy done on 4/26 -->f/u results   - TVUS: postmenopausal vaginal thickening 9mm    # Elevated Troponins   - Asymptomatic, no chest pain  - ECG: no ischemic changes   - c/w aspirin, statin and metoprolol  - will need LHC prior to AVR once more stable as per Dr. Walker      #MISC  -DVT: lovenox sq   -GI: PPI   -Diet: DASH  -Activity: IAT   -Code status: Full code (rediscussed with patient on 4/26)

## 2023-04-27 NOTE — PROGRESS NOTE ADULT - ASSESSMENT
77 y.o Female with PMHx of HTN, Aortic stenosis, atrial tachycardia, chronic venous stasis and OA BIBEMS for chief complaint of fall. Patient states she is here because her doctor told her to come in for evaluation of her legs which she states have been swelling up admitted for CHF exacerbation/critical AS. GI was consulted for anemia. Patient denies any abdominal pain, nausea, vomiting, hematemesis, melena, hematochezia, weight loss.     #)Chronic iron deficiency anemia   -hemodynamically stable   -Hb stable at 8  -Denies any melena, or hematochezia. having brown stool  -EGD and colonoscopy long time ago as per patient     Recs:  trend CBC, keep Hb>8  active type and screen   risks of benefits of EGD and colonoscopy were explained to patient. As TAVR is considered electively to be done as outpatient will have patient follow up with GI as outpatient and once risk stratified and optimized by cardiology will plan for EGD/Colonoscopy +/- VCE under cardiac anesthesia as outpatient  please notify GI if any overt evidence of bleeding per rectum or mouth and will plan for emergent inpatient workup in collaboration with Cardiology  CT surgery follow up  - Follow up with our [facility] located at [address] [place] [address]. Phone Number: [phone]

## 2023-04-27 NOTE — PROGRESS NOTE ADULT - SUBJECTIVE AND OBJECTIVE BOX
JESÚS TERRELL  77y Female    CHIEF COMPLAINT:    Patient is a 77y old  Female who presents with a chief complaint of Fall (27 Apr 2023 12:18)      INTERVAL HPI/OVERNIGHT EVENTS:    Patient seen and examined. No sob. No palpitations. No dizziness.     ROS: All other systems are negative.    Vital Signs:    T(F): 97.1 (04-27-23 @ 13:00), Max: 97.6 (04-26-23 @ 20:18)  HR: 75 (04-27-23 @ 13:00) (75 - 84)  BP: 111/55 (04-27-23 @ 13:00) (111/55 - 117/57)  RR: 17 (04-27-23 @ 13:00) (17 - 18)  SpO2: 94% (04-26-23 @ 22:50) (94% - 94%)  I&O's Summary    26 Apr 2023 07:01  -  27 Apr 2023 07:00  --------------------------------------------------------  IN: 886 mL / OUT: 1486 mL / NET: -600 mL    27 Apr 2023 07:01  -  27 Apr 2023 14:59  --------------------------------------------------------  IN: 240 mL / OUT: 0 mL / NET: 240 mL      Daily     Daily   CAPILLARY BLOOD GLUCOSE          PHYSICAL EXAM:    GENERAL:  NAD  SKIN: No rashes or lesions  HENT: Atraumatic. Normocephalic. PERRL. Moist membranes.  NECK: Supple, No JVD. No lymphadenopathy.  PULMONARY: CTA B/L. No wheezing. No rales  CVS: Normal S1, S2. Rate and Rhythm are regular. A III/VI systolic mm over aortic area.   ABDOMEN/GI: Soft, Nontender, Nondistended; BS present  EXTREMITIES: Peripheral pulses intact. No edema B/L LE.  NEUROLOGIC:  No motor or sensory deficit.  PSYCH: Alert & oriented x 3    Consultant(s) Notes Reviewed:  [x ] YES  [ ] NO  Care Discussed with Consultants/Other Providers [ x] YES  [ ] NO    EKG reviewed  Telemetry reviewed    LABS:                        8.5    6.72  )-----------( 447      ( 27 Apr 2023 05:10 )             28.9     04-27    139  |  101  |  16  ----------------------------<  85  4.5   |  30  |  0.7    Ca    7.6<L>      27 Apr 2023 05:10  Mg     2.1     04-27    TPro  5.3<L>  /  Alb  2.1<L>  /  TBili  0.4  /  DBili  x   /  AST  32  /  ALT  17  /  AlkPhos  104  04-27              RADIOLOGY & ADDITIONAL TESTS:    < from: CT Abdomen and Pelvis w/ Oral Cont and w/ IV Cont (04.26.23 @ 13:40) >    IMPRESSION:  1.  Gallbladder air and trace pneumobilia is again noted, of uncertain   etiology. No cholecystoenteric fistula is identified.  2.  There is a questionable nodular liver contour, raising the   possibilityof early cirrhosis.  3.  There is blas-rectosigmoid inflammatory change suggesting   proctocolitis.  4.  Anasarca is noted.    < end of copied text >    Imaging or report Personally Reviewed:  [x ] YES  [ ] NO    Medications:  Standing  atorvastatin 40 milliGRAM(s) Oral at bedtime  chlorhexidine 2% Cloths 1 Application(s) Topical <User Schedule>  enoxaparin Injectable 40 milliGRAM(s) SubCutaneous every 24 hours  furosemide   Injectable 40 milliGRAM(s) IV Push daily  metoprolol tartrate 12.5 milliGRAM(s) Oral two times a day  pantoprazole    Tablet 40 milliGRAM(s) Oral two times a day    PRN Meds      Case discussed with resident    Care discussed with pt/family

## 2023-04-27 NOTE — PROGRESS NOTE ADULT - SUBJECTIVE AND OBJECTIVE BOX
Gastroenterology progress note:     Patient is a 77y old  Female who presents with a chief complaint of Fall (27 Apr 2023 08:50)       Admitted on: 04-20-23    We are following the patient for: iron deficiency anemia       Interval History:    No acute events overnight.   - Diet - tolerating  - last BM - 1 today brown  - Abdominal pain - none      PAST MEDICAL & SURGICAL HISTORY:  H/O atrial tachycardia      Osteoarthritis      Hypertension      Aortic stenosis      No significant past surgical history          MEDICATIONS  (STANDING):  atorvastatin 40 milliGRAM(s) Oral at bedtime  chlorhexidine 2% Cloths 1 Application(s) Topical <User Schedule>  enoxaparin Injectable 40 milliGRAM(s) SubCutaneous every 24 hours  furosemide   Injectable 40 milliGRAM(s) IV Push daily  metoprolol tartrate 12.5 milliGRAM(s) Oral two times a day  pantoprazole    Tablet 40 milliGRAM(s) Oral two times a day    MEDICATIONS  (PRN):      Allergies  Allergy Status Unknown      Review of Systems:   Cardiovascular:  No Chest Pain, No Palpitations  Respiratory:  No Cough, No Dyspnea  Gastrointestinal:  As described in HPI  Skin:  No Skin Lesions, No Jaundice  Neuro:  No Syncope, No Dizziness    Physical Examination:  T(C): 36.3 (04-27-23 @ 05:23), Max: 36.4 (04-26-23 @ 20:18)  HR: 77 (04-27-23 @ 05:23) (72 - 84)  BP: 117/57 (04-27-23 @ 05:23) (116/56 - 117/59)  RR: 18 (04-26-23 @ 22:50) (18 - 18)  SpO2: 94% (04-26-23 @ 22:50) (94% - 94%)      04-26-23 @ 07:01  -  04-27-23 @ 07:00  --------------------------------------------------------  IN: 886 mL / OUT: 1486 mL / NET: -600 mL    04-27-23 @ 07:01  -  04-27-23 @ 12:19  --------------------------------------------------------  IN: 240 mL / OUT: 0 mL / NET: 240 mL        GENERAL: AAOx3, no acute distress.  HEAD:  Atraumatic, Normocephalic  EYES: conjunctiva and sclera clear  NECK: Supple, no JVD or thyromegaly  CHEST/LUNG: reduced breath sounds b/l  HEART: Regular rate and rhythm;  ABDOMEN: Soft, nontender, nondistended; Bowel sounds present  NEUROLOGY: No asterixis or tremor.        Data:                        8.5    6.72  )-----------( 447      ( 27 Apr 2023 05:10 )             28.9     Hgb trend:  8.5  04-27-23 @ 05:10  8.8  04-26-23 @ 06:15  8.3  04-25-23 @ 07:04        04-27    139  |  101  |  16  ----------------------------<  85  4.5   |  30  |  0.7    Ca    7.6<L>      27 Apr 2023 05:10  Mg     2.1     04-27    TPro  5.3<L>  /  Alb  2.1<L>  /  TBili  0.4  /  DBili  x   /  AST  32  /  ALT  17  /  AlkPhos  104  04-27    Liver panel trend:  TBili 0.4   /   AST 32   /   ALT 17   /   AlkP 104   /   Tptn 5.3   /   Alb 2.1    /   DBili --      04-27  TBili 0.3   /   AST 36   /   ALT 16   /   AlkP 96   /   Tptn 5.5   /   Alb 2.2    /   DBili --      04-26  TBili 0.5   /   AST 28   /   ALT 13   /   AlkP 79   /   Tptn 5.2   /   Alb 2.2    /   DBili --      04-25  TBili 0.3   /   AST 25   /   ALT 13   /   AlkP 73   /   Tptn 5.4   /   Alb 2.4    /   DBili --      04-24  TBili 0.3   /   AST 25   /   ALT 13   /   AlkP 72   /   Tptn 5.3   /   Alb 2.3    /   DBili --      04-23  TBili 0.3   /   AST 29   /   ALT 13   /   AlkP 74   /   Tptn 5.1   /   Alb 2.3    /   DBili --      04-22  TBili 0.5   /   AST 27   /   ALT 12   /   AlkP 78   /   Tptn 5.3   /   Alb 2.4    /   DBili --      04-21             Radiology:  CT Abdomen and Pelvis w/ Oral Cont and w/ IV Cont:   ACC: [id number] EXAM:  CT ABDOMEN AND PELVIS OC IC   ORDERED BY: NOEL DING     PROCEDURE DATE:  04/26/2023          INTERPRETATION:  CLINICAL STATEMENT: Pneumobilia found on CTA    TECHNIQUE: Contiguous axial CT images were obtained from the lower chest   to the pubic symphysis after the intravenous administration 100 cc of   Omnipaque 350 (0 cc discarded).  Oral contrast wasn't administered.    Reformatted images in the coronal and sagittal planes were acquired.    COMPARISON CT: CTA chest abdomen pelvis dated 4/25/2023.      FINDINGS:    Exam slightly degraded by patient arm positioning at their side.    LOWER CHEST: Cardiomegaly.    HEPATOBILIARY: Question micronodular liver contour. There is a   subcentimeter calcification of the liver dome. No radiopaque gallstones.   Gallbladder and trace pneumobilia again noted, without identified   etiology. No biliary ductal dilatation.    SPLEEN: Unremarkable.    PANCREAS: Previously described pancreatic cysts are poorly visualized on   this exam.    ADRENAL GLANDS: Unremarkable.    KIDNEYS: Symmetric parenchymal enhancement. No hydronephrosis or   perinephric stranding. A few left renal cysts measuring up to 7 cm..    ABDOMINOPELVIC NODES: Prominent retroperitoneal, bilateral iliac, and   perirectal lymph nodes again noted.    PELVIC ORGANS: Unremarkable.    PERITONEUM/MESENTERY/BOWEL: There is blas-rectosigmoid fat stranding and   mild wall thickening. There is no evidence of bowel obstruction. There is   no free air or fluid.    VASCULATURE: There is no abdominal aortic aneurysm.    BONES/SOFT TISSUES: There is anasarca. No acute osseous abnormality is   seen. Degenerative change and grade 1 anterolisthesis is noted at L5-S1.   There is thoracolumbar levoscoliosis and transitional lumbosacral anatomy.      IMPRESSION:  1.  Gallbladder air and trace pneumobilia is again noted, of uncertain   etiology. No cholecystoenteric fistula is identified.  2.  There is a questionable nodular liver contour, raising the   possibilityof early cirrhosis.  3.  There is blas-rectosigmoid inflammatory change suggesting   proctocolitis.  4.  Anasarca is noted.    --- End of Report ---            SIS BYERS MD; Attending Radiologist  This document has been electronically signed. Apr 26 2023  3:14PM (04-26-23 @ 13:40)    US Abdomen Upper Quadrant Right:   ACC: [id number] EXAM:  US ABDOMEN RT UPR QUADRANT   ORDERED BY: NOEL DING     PROCEDURE DATE:  04/26/2023          INTERPRETATION:  CLINICAL INFORMATION: Pneumobilia    COMPARISON: CT dated 4/26/2020    TECHNIQUE: Sonography of the right upperquadrant.    FINDINGS:  Liver: Better evaluated on earlier same day CT.  Bile ducts: CBD nondilated at 4 mm. Pneumobilia better seen on CT.  Gallbladder: Gallbladder nonvisualized, unclear if surgically absent.  Pancreas: Poorly visualized.  Right kidney: 11.0 cm. No hydronephrosis.  Ascites: None.      IMPRESSION:    Gallbladder nonvisualized, unclear if surgically absent. Correlate with   surgical history.    Pneumobilia better seen on CT.    --- End of Report ---            JORGE ABRAMS MD; Attending Radiologist  This document has been electronically signed. Apr 27 2023  8:29AM (04-26-23 @ 18:17)

## 2023-04-27 NOTE — PROGRESS NOTE ADULT - SUBJECTIVE AND OBJECTIVE BOX
JESÚS BAKER 77y Female  MRN#: [id number]     Hospital Day: 7d    Pt is currently admitted with the primary diagnosis of  Localized edema        SUBJECTIVE     Patient was seen this morning. Denies any complaints.                                             ----------------------------------------------------------  OBJECTIVE  PAST MEDICAL & SURGICAL HISTORY  H/O atrial tachycardia    Osteoarthritis    Hypertension    Aortic stenosis    No significant past surgical history                                              -----------------------------------------------------------  ALLERGIES:  Allergy Status Unknown                                            ------------------------------------------------------------    HOME MEDICATIONS  Home Medications:  furosemide 20 mg oral tablet: 1 orally once a day (21 Apr 2023 00:52)  metOLazone 2.5 mg oral tablet: 1 orally 2 times a week (21 Apr 2023 00:52)                           MEDICATIONS:  STANDING MEDICATIONS  atorvastatin 40 milliGRAM(s) Oral at bedtime  chlorhexidine 2% Cloths 1 Application(s) Topical <User Schedule>  enoxaparin Injectable 40 milliGRAM(s) SubCutaneous every 24 hours  furosemide   Injectable 40 milliGRAM(s) IV Push daily  metoprolol tartrate 12.5 milliGRAM(s) Oral two times a day  pantoprazole    Tablet 40 milliGRAM(s) Oral two times a day    PRN MEDICATIONS                                            ------------------------------------------------------------  VITAL SIGNS: Last 24 Hours  T(C): 36.3 (27 Apr 2023 05:23), Max: 36.4 (26 Apr 2023 20:18)  T(F): 97.4 (27 Apr 2023 05:23), Max: 97.6 (26 Apr 2023 20:18)  HR: 77 (27 Apr 2023 05:23) (72 - 84)  BP: 117/57 (27 Apr 2023 05:23) (116/56 - 117/59)  BP(mean): --  RR: 18 (26 Apr 2023 22:50) (18 - 18)  SpO2: 94% (26 Apr 2023 22:50) (94% - 94%)      04-26-23 @ 07:01  -  04-27-23 @ 07:00  --------------------------------------------------------  IN: 886 mL / OUT: 1486 mL / NET: -600 mL                                             --------------------------------------------------------------  LABS:                        8.5    6.72  )-----------( 447      ( 27 Apr 2023 05:10 )             28.9     04-27    139  |  101  |  16  ----------------------------<  85  4.5   |  30  |  0.7    Ca    7.6<L>      27 Apr 2023 05:10  Mg     2.1     04-27    TPro  5.3<L>  /  Alb  2.1<L>  /  TBili  0.4  /  DBili  x   /  AST  32  /  ALT  17  /  AlkPhos  104  04-27                                                --------------------------------------------------------------    PHYSICAL EXAM:  GENERAL: NAD, lying in bed comfortably  NERVOUS SYSTEM:  Alert & Oriented X3   CHEST/LUNG: Clear to auscultation bilaterally.   HEART: regular rate and rhythm;   ABDOMEN: Soft, Nontender, Nondistended,   EXTREMITIES: No edema. No clubbing. ---------------------

## 2023-04-27 NOTE — PROGRESS NOTE ADULT - ASSESSMENT
77 y.o Female with PMHx of HTN, Aortic stenosis, atrial tachycardia, chronic venous stasis and OA BIBEMS for chief complaint of fall. She was sent in by her doctor for evaluation of her leg swelling. She fell while sliding to the side of her couch and landed on her buttock.     Acute HFpEF due to critical AS  Critical AS  S/P Fall.   NSTEMI likely type 2  Vaginal bleeding  Iron deficiency anemia  HTN  Chronic venous stasis.             PLAN:    ·	Cont tele  ·	CT abd/pelvis with po and oral contrast reviewed. GB air and trace pneumobilia. Questionable nodular liver. Margo-rectosigmoid inflammatory change suggesting proctocolitis.   ·	GI eval noted and care d/w the GI attending. Recommended to f/u with GI as an outpatient, and once risk stratified and optimized by cardiology will plan for EGD/Colonoscopy +/- VCE under cardiac anesthesia as outpatient  ·	Troponin trending up but CKMB is normal. Likely due NSTEMI type 2  ·	Cont Lasix 40 mg ivp daily  ·	Check i's and o's and daily wt  ·	Low salt diet and water restriction to 1.5 L/D  ·	ECHO reviewed. EF is 75%. Critical AS. Severe pulmonary HTN. IVC dilated.   ·	Structural heart disease eval  ·	Transvaginal US reviewed. The upper vaginal canal is distended with fluid. Postmenopausal vaginal thickening to 9 mm  ·	Vaginal bleeding. S/P biopsy by the OBGYN. Check the result  ·	S/P one unit of PRBC'S  ·	Monitor H/H  ·	Cont IV Venofer 200 mg iv daily x 5 doses.   ·	CTS eval noted. Will need AVR  ·	TAVR protocol CT. Will also need cardiac cath. If cardiology does not cath the pt during this admission, will discharge her.   ·	Discussed GOC of care with the pt. Full code.     Progress Note Handoff    Pending (specify):  Consults__Cardiology for cath_______, Tests_______, Test Results_______, Other_________  Family discussion: With the daughter about diagnosis, plan of care and d/c planning  Disposition: Home___/SNF___/Other________/Unknown at this time________    Carloz Cheatham MD  Spectra: [phone]

## 2023-04-27 NOTE — CHART NOTE - NSCHARTNOTEFT_GEN_A_CORE
Case discussed on CT rounds  Prior to TAVR   patient requires cardiac catheterization   upper and lower endoscopy   results of endometrial biopsy    When studies complete and results obtained please recall.  If studies not completed this admission, please have patient follow up in Structural Heart clinic [phone]

## 2023-04-28 ENCOUNTER — TRANSCRIPTION ENCOUNTER (OUTPATIENT)
Age: 78
End: 2023-04-28

## 2023-04-28 VITALS
HEART RATE: 71 BPM | TEMPERATURE: 99 F | DIASTOLIC BLOOD PRESSURE: 58 MMHG | SYSTOLIC BLOOD PRESSURE: 108 MMHG | RESPIRATION RATE: 18 BRPM

## 2023-04-28 PROBLEM — M19.90 UNSPECIFIED OSTEOARTHRITIS, UNSPECIFIED SITE: Chronic | Status: ACTIVE | Noted: 2023-04-21

## 2023-04-28 PROBLEM — I10 ESSENTIAL (PRIMARY) HYPERTENSION: Chronic | Status: ACTIVE | Noted: 2023-04-21

## 2023-04-28 PROBLEM — Z86.79 PERSONAL HISTORY OF OTHER DISEASES OF THE CIRCULATORY SYSTEM: Chronic | Status: ACTIVE | Noted: 2023-04-21

## 2023-04-28 PROBLEM — I35.0 NONRHEUMATIC AORTIC (VALVE) STENOSIS: Chronic | Status: ACTIVE | Noted: 2023-04-21

## 2023-04-28 LAB
ALBUMIN SERPL ELPH-MCNC: 2.3 G/DL — LOW (ref 3.5–5.2)
ALP SERPL-CCNC: 127 U/L — HIGH (ref 30–115)
ALT FLD-CCNC: 21 U/L — SIGNIFICANT CHANGE UP (ref 0–41)
ANION GAP SERPL CALC-SCNC: 7 MMOL/L — SIGNIFICANT CHANGE UP (ref 7–14)
AST SERPL-CCNC: 38 U/L — SIGNIFICANT CHANGE UP (ref 0–41)
BASOPHILS # BLD AUTO: 0.05 K/UL — SIGNIFICANT CHANGE UP (ref 0–0.2)
BASOPHILS NFR BLD AUTO: 0.8 % — SIGNIFICANT CHANGE UP (ref 0–1)
BILIRUB SERPL-MCNC: 0.3 MG/DL — SIGNIFICANT CHANGE UP (ref 0.2–1.2)
BUN SERPL-MCNC: 16 MG/DL — SIGNIFICANT CHANGE UP (ref 10–20)
CALCIUM SERPL-MCNC: 8 MG/DL — LOW (ref 8.4–10.5)
CHLORIDE SERPL-SCNC: 96 MMOL/L — LOW (ref 98–110)
CO2 SERPL-SCNC: 31 MMOL/L — SIGNIFICANT CHANGE UP (ref 17–32)
CREAT SERPL-MCNC: 0.7 MG/DL — SIGNIFICANT CHANGE UP (ref 0.7–1.5)
EGFR: 88 ML/MIN/1.73M2 — SIGNIFICANT CHANGE UP
EOSINOPHIL # BLD AUTO: 0.33 K/UL — SIGNIFICANT CHANGE UP (ref 0–0.7)
EOSINOPHIL NFR BLD AUTO: 5.5 % — SIGNIFICANT CHANGE UP (ref 0–8)
GLUCOSE SERPL-MCNC: 89 MG/DL — SIGNIFICANT CHANGE UP (ref 70–99)
HCT VFR BLD CALC: 29.1 % — LOW (ref 37–47)
HGB BLD-MCNC: 8.7 G/DL — LOW (ref 12–16)
IMM GRANULOCYTES NFR BLD AUTO: 0.8 % — HIGH (ref 0.1–0.3)
LYMPHOCYTES # BLD AUTO: 1.53 K/UL — SIGNIFICANT CHANGE UP (ref 1.2–3.4)
LYMPHOCYTES # BLD AUTO: 25.7 % — SIGNIFICANT CHANGE UP (ref 20.5–51.1)
MAGNESIUM SERPL-MCNC: 2.1 MG/DL — SIGNIFICANT CHANGE UP (ref 1.8–2.4)
MCHC RBC-ENTMCNC: 24.3 PG — LOW (ref 27–31)
MCHC RBC-ENTMCNC: 29.9 G/DL — LOW (ref 32–37)
MCV RBC AUTO: 81.3 FL — SIGNIFICANT CHANGE UP (ref 81–99)
MONOCYTES # BLD AUTO: 0.83 K/UL — HIGH (ref 0.1–0.6)
MONOCYTES NFR BLD AUTO: 13.9 % — HIGH (ref 1.7–9.3)
NEUTROPHILS # BLD AUTO: 3.16 K/UL — SIGNIFICANT CHANGE UP (ref 1.4–6.5)
NEUTROPHILS NFR BLD AUTO: 53.3 % — SIGNIFICANT CHANGE UP (ref 42.2–75.2)
NRBC # BLD: 0 /100 WBCS — SIGNIFICANT CHANGE UP (ref 0–0)
PLATELET # BLD AUTO: 426 K/UL — HIGH (ref 130–400)
PMV BLD: 10 FL — SIGNIFICANT CHANGE UP (ref 7.4–10.4)
POTASSIUM SERPL-MCNC: 4.2 MMOL/L — SIGNIFICANT CHANGE UP (ref 3.5–5)
POTASSIUM SERPL-SCNC: 4.2 MMOL/L — SIGNIFICANT CHANGE UP (ref 3.5–5)
PROT SERPL-MCNC: 5.4 G/DL — LOW (ref 6–8)
RBC # BLD: 3.58 M/UL — LOW (ref 4.2–5.4)
RBC # FLD: 22.4 % — HIGH (ref 11.5–14.5)
SARS-COV-2 RNA SPEC QL NAA+PROBE: SIGNIFICANT CHANGE UP
SODIUM SERPL-SCNC: 134 MMOL/L — LOW (ref 135–146)
WBC # BLD: 5.95 K/UL — SIGNIFICANT CHANGE UP (ref 4.8–10.8)
WBC # FLD AUTO: 5.95 K/UL — SIGNIFICANT CHANGE UP (ref 4.8–10.8)

## 2023-04-28 PROCEDURE — 71045 X-RAY EXAM CHEST 1 VIEW: CPT | Mod: 26

## 2023-04-28 PROCEDURE — 99222 1ST HOSP IP/OBS MODERATE 55: CPT | Mod: 25

## 2023-04-28 PROCEDURE — 99239 HOSP IP/OBS DSCHRG MGMT >30: CPT

## 2023-04-28 PROCEDURE — 58100 BIOPSY OF UTERUS LINING: CPT | Mod: 59

## 2023-04-28 RX ORDER — PANTOPRAZOLE SODIUM 20 MG/1
1 TABLET, DELAYED RELEASE ORAL
Qty: 0 | Refills: 0 | DISCHARGE
Start: 2023-04-28

## 2023-04-28 RX ORDER — METOPROLOL TARTRATE 50 MG
12.5 TABLET ORAL
Qty: 0 | Refills: 0 | DISCHARGE
Start: 2023-04-28

## 2023-04-28 RX ORDER — METOPROLOL TARTRATE 50 MG
1 TABLET ORAL
Qty: 30 | Refills: 0
Start: 2023-04-28 | End: 2023-05-27

## 2023-04-28 RX ORDER — FERROUS SULFATE 325(65) MG
1 TABLET ORAL
Qty: 30 | Refills: 0
Start: 2023-04-28 | End: 2023-05-27

## 2023-04-28 RX ORDER — ATORVASTATIN CALCIUM 80 MG/1
1 TABLET, FILM COATED ORAL
Qty: 0 | Refills: 0 | DISCHARGE
Start: 2023-04-28

## 2023-04-28 RX ORDER — METOLAZONE 5 MG/1
1 TABLET ORAL
Refills: 0 | DISCHARGE

## 2023-04-28 RX ORDER — FUROSEMIDE 40 MG
1 TABLET ORAL
Refills: 0 | DISCHARGE

## 2023-04-28 RX ADMIN — Medication 12.5 MILLIGRAM(S): at 05:48

## 2023-04-28 RX ADMIN — Medication 40 MILLIGRAM(S): at 05:48

## 2023-04-28 RX ADMIN — CHLORHEXIDINE GLUCONATE 1 APPLICATION(S): 213 SOLUTION TOPICAL at 05:48

## 2023-04-28 RX ADMIN — PANTOPRAZOLE SODIUM 40 MILLIGRAM(S): 20 TABLET, DELAYED RELEASE ORAL at 17:24

## 2023-04-28 RX ADMIN — ENOXAPARIN SODIUM 40 MILLIGRAM(S): 100 INJECTION SUBCUTANEOUS at 00:09

## 2023-04-28 RX ADMIN — PANTOPRAZOLE SODIUM 40 MILLIGRAM(S): 20 TABLET, DELAYED RELEASE ORAL at 05:48

## 2023-04-28 RX ADMIN — Medication 12.5 MILLIGRAM(S): at 17:24

## 2023-04-28 NOTE — DISCHARGE NOTE PROVIDER - NSDCCPCAREPLAN_GEN_ALL_CORE_FT
PRINCIPAL DISCHARGE DIAGNOSIS  Diagnosis: Aortic stenosis  Assessment and Plan of Treatment: You have a narrowing in one of your heart valves and was seen by the surgical team for surgery. They recommended pre-op workup and wanted you to have a few tests before goign or the surgery. Once the following work up is done, please follow up in Structural Heart clinic at [phone].  1) During the work up, you were found to have thickening in a part of your colon, GI team saw you and recommended doing colonoscopy to find the cause of the thickening. Please follow up with them in clinic.  2) You were also seen by cardiology Dr. Turner who recommended cardiac cath to be done as outpatient  3) Gynecology saw you for the vaginal Bleed, and took an endometrial biopsy, please follow up with them for the result.        SECONDARY DISCHARGE DIAGNOSES  Diagnosis: Elevated troponin  Assessment and Plan of Treatment:     Diagnosis: Acute UTI  Assessment and Plan of Treatment:     Diagnosis: Unable to ambulate  Assessment and Plan of Treatment:      PRINCIPAL DISCHARGE DIAGNOSIS  Diagnosis: Aortic stenosis  Assessment and Plan of Treatment: You have a narrowing in one of your heart valves and was seen by the surgical team for surgery. They recommended pre-op workup and wanted you to have a few tests before goign or the surgery. Once the following work up is done, please follow up in Structural Heart clinic at [phone].  1) During the work up, you were found to have thickening in a part of your colon, GI team saw you and recommended doing colonoscopy to find the cause of the thickening. Please follow up with them in [facility] located at [address] [place] [address]. Phone Number: [phone]  2) You were also seen by cardiology Dr. Turner who recommended cardiac cath to be done as outpatient  3) Gynecology saw you for the vaginal bleed, and took an endometrial biopsy, [facility] at [address] [place] [address]  for the result.         SECONDARY DISCHARGE DIAGNOSES  Diagnosis: Elevated troponin  Assessment and Plan of Treatment:     Diagnosis: Acute UTI  Assessment and Plan of Treatment:     Diagnosis: Unable to ambulate  Assessment and Plan of Treatment:

## 2023-04-28 NOTE — DISCHARGE NOTE PROVIDER - NSDCFUADDINST_GEN_ALL_CORE_FT
Left buttock wound:   Cleanse wound with soap and water  Apply moisture barrier cream to wound and surrounding area twice a day  Apply Allevyn to bony prominence prophylactically  Maintain pressure injury prevention.   Offload heels  Keep skin clean.  Maintain incontinence care.

## 2023-04-28 NOTE — DISCHARGE NOTE PROVIDER - CARE PROVIDER_API CALL
Thong Walker  CARDIOVASCULAR DISEASE  [address] [facility] [address]  [place] [address]  Phone: [phone]  Fax: [phone]  Follow Up Time:

## 2023-04-28 NOTE — PROGRESS NOTE ADULT - SUBJECTIVE AND OBJECTIVE BOX
JESÚS BAKER 77y Female  MRN#: [id number]     Hospital Day: 8d    Pt is currently admitted with the primary diagnosis of  Localized edema        SUBJECTIVE     Patient was seen this morning. Denies any complaints.                                             ----------------------------------------------------------  OBJECTIVE  PAST MEDICAL & SURGICAL HISTORY  H/O atrial tachycardia    Osteoarthritis    Hypertension    Aortic stenosis    No significant past surgical history                                              -----------------------------------------------------------  ALLERGIES:  Allergy Status Unknown                                            ------------------------------------------------------------    HOME MEDICATIONS  Home Medications:  furosemide 20 mg oral tablet: 1 orally once a day (21 Apr 2023 00:52)  metOLazone 2.5 mg oral tablet: 1 orally 2 times a week (21 Apr 2023 00:52)                           MEDICATIONS:  STANDING MEDICATIONS  atorvastatin 40 milliGRAM(s) Oral at bedtime  chlorhexidine 2% Cloths 1 Application(s) Topical <User Schedule>  enoxaparin Injectable 40 milliGRAM(s) SubCutaneous every 24 hours  furosemide   Injectable 40 milliGRAM(s) IV Push daily  metoprolol tartrate 12.5 milliGRAM(s) Oral two times a day  pantoprazole    Tablet 40 milliGRAM(s) Oral two times a day    PRN MEDICATIONS                                            ------------------------------------------------------------  VITAL SIGNS: Last 24 Hours  T(C): 36.3 (28 Apr 2023 05:24), Max: 36.3 (27 Apr 2023 19:18)  T(F): 97.3 (28 Apr 2023 05:24), Max: 97.4 (27 Apr 2023 19:18)  HR: 76 (28 Apr 2023 05:24) (75 - 76)  BP: 106/59 (28 Apr 2023 05:24) (104/57 - 111/55)  BP(mean): --  RR: 18 (27 Apr 2023 19:18) (17 - 18)  SpO2: --      04-27-23 @ 07:01  -  04-28-23 @ 07:00  --------------------------------------------------------  IN: 646 mL / OUT: 400 mL / NET: 246 mL                                             --------------------------------------------------------------  LABS:                        8.7    5.95  )-----------( 426      ( 28 Apr 2023 05:58 )             29.1     04-28    134<L>  |  96<L>  |  16  ----------------------------<  89  4.2   |  31  |  0.7    Ca    8.0<L>      28 Apr 2023 05:58  Mg     2.1     04-28    TPro  5.4<L>  /  Alb  2.3<L>  /  TBili  0.3  /  DBili  x   /  AST  38  /  ALT  21  /  AlkPhos  127<H>  04-28                                                    --------------------------------------------------------------    PHYSICAL EXAM:  GENERAL: NAD, lying in bed comfortably  NERVOUS SYSTEM:  Alert & Oriented X3   CHEST/LUNG: Clear to auscultation bilaterally. No wheezes heard  HEART: regular rate and rhythm; aortic murmur heard  ABDOMEN: Soft, Nontender, Nondistended,   EXTREMITIES: decreased edema                                         --------------------------------------------------------------

## 2023-04-28 NOTE — PROGRESS NOTE ADULT - ASSESSMENT
77 y.o Female with PMHx of HTN, Aortic stenosis, atrial tachycardia, chronic venous stasis and OA BIBEMS for chief complaint of fall. She was sent in by her doctor for evaluation of her leg swelling. She fell while sliding to the side of her couch and landed on her buttock.     Acute HFpEF due to critical AS  Critical AS  S/P Fall.   NSTEMI likely type 2  Vaginal bleeding  Iron deficiency anemia  HTN  Chronic venous stasis.             PLAN:    ·	Cont tele  ·	CT abd/pelvis with po and oral contrast reviewed. GB air and trace pneumobilia. Questionable nodular liver. Margo-rectosigmoid inflammatory change suggesting proctocolitis.   ·	GI eval noted and care d/w the GI attending. Recommended to f/u with GI as an outpatient, and once risk stratified and optimized by cardiology will plan for EGD/Colonoscopy +/- VCE under cardiac anesthesia as outpatient  ·	Troponin trending up but CKMB is normal. Likely due NSTEMI type 2  ·	Can switch her to Torsemide 20 mg po daily  ·	Check i's and o's and daily wt  ·	Low salt diet and water restriction to 1.5 L/D  ·	ECHO reviewed. EF is 75%. Critical AS. Severe pulmonary HTN. IVC dilated.   ·	CTS recommended out pt f/u fro TAVR  ·	Transvaginal US reviewed. The upper vaginal canal is distended with fluid. Postmenopausal vaginal thickening to 9 mm  ·	Vaginal bleeding. S/P biopsy by the OBGYN. Check the result  ·	S/P one unit of PRBC'S  ·	Received Venofer 1 gm IV during this admission  ·	CTS eval noted. Will need AVR  ·	TAVR protocol CT done.    ·	Discussed GOC of care with the pt. Full code.     * Med rec reviewed. Plan of care d/w the pt and her daughter. Time spent 40 minutes.     Progress Note Handoff    Pending (specify):  Consults________, Tests_______, Test Results_______, Other__Social services for d/c planning_______  Family discussion: With the daughter about diagnosis, plan of care and d/c planning  Disposition: Home___/SNF___/Other________/Unknown at this time________    Carloz Cheatham MD  Spectra: [phone]

## 2023-04-28 NOTE — DISCHARGE NOTE PROVIDER - NSDCMRMEDTOKEN_GEN_ALL_CORE_FT
atorvastatin 40 mg oral tablet: 1 tab(s) orally once a day (at bedtime)  metoprolol: 12.5 milligram(s) orally 2 times a day  pantoprazole 40 mg oral delayed release tablet: 1 tab(s) orally 2 times a day   atorvastatin 40 mg oral tablet: 1 tab(s) orally once a day (at bedtime)  ferrous sulfate 324 mg (65 mg elemental iron) oral delayed release tablet: 1 tab(s) orally once a day  pantoprazole 40 mg oral delayed release tablet: 1 tab(s) orally 2 times a day  Toprol-XL 25 mg oral tablet, extended release: 1 tab(s) orally once a day  torsemide 20 mg oral tablet: 1 tab(s) orally once a day

## 2023-04-28 NOTE — PROGRESS NOTE ADULT - PROVIDER SPECIALTY LIST ADULT
Internal Medicine
CT Surgery
Cardiology
Hospitalist
Internal Medicine
Internal Medicine
CCU
Cardiology
Gastroenterology
Hospitalist
Internal Medicine
Cardiology
Hospitalist
Hospitalist
Internal Medicine
CCU

## 2023-04-28 NOTE — CHART NOTE - NSCHARTNOTEFT_GEN_A_CORE
Registered Dietitian Follow-Up  Patient Profile Reviewed                           Yes [x]   No []  Nutrition History Previously Obtained        Yes [x]  No []      Pertinent Medical Interventions:  77 y.o Female with PMHx of HTN, Aortic stenosis, atrial tachycardia, chronic venous stasis and OA BIBEMS for chief complaint of fall.     Nutrition Interval History:   good PO intake, % of meal tray completion, added prosource gelatein 2x/day  Nutrient Intake: Patient meeting >75% of estimated energy needs in-house     Diet order:   Diet, NPO after Midnight:      NPO Start Date: 2023,   NPO Start Time: 23:59  Except Medications (23 @ 15:24) [Active]  Diet, DASH/TLC:   Sodium & Cholesterol Restricted  Prosource Gelatein Plus     Qty per Day:  2 (23 @ 21:07) [Active]    Anthropometrics:  Height (cm): 167.6 (23 @ 20:43)  Weight (kg): 90.7 (23 @ 13:48)  BMI (kg/m2): 32.3 (23 @ 20:43)    current admission weights (kg):  Daily Weight in k.4 ()  Weight in k.8 ()  Weight in k.2 ()  fluid shifts     MEDICATIONS  (STANDING):  atorvastatin 40 milliGRAM(s) Oral at bedtime  chlorhexidine 2% Cloths 1 Application(s) Topical <User Schedule>  enoxaparin Injectable 40 milliGRAM(s) SubCutaneous every 24 hours  furosemide   Injectable 40 milliGRAM(s) IV Push daily  metoprolol tartrate 12.5 milliGRAM(s) Oral two times a day  pantoprazole    Tablet 40 milliGRAM(s) Oral two times a day    Pertinent Labs:  @ 05:58: Na 134<L>, BUN 16, Cr 0.7, BG 89, K+ 4.2, Phos --, Mg 2.1, Alk Phos 127<H>, ALT/SGPT 21, AST/SGOT 38, HbA1c --    Physical Findings:  - Cognition: A&Ox4   - GI function: Last BM   - Tubes: n/a  - Oral/Mouth cavity: WNL   - Skin: no pressure injuries indicated in flow sheets, per WOCN assessment Type of Wound: Healing scratch, Location: Left buttock  - Edema: L/R leg nonpitting     Nutrition Requirements: with consideration for age, weight, BMI  Weight Used: 91kg     Estimated Calorie Needs: [id number] x AF 1.1-1.2=5037-1973ewqy/day -Due to obesity  Estimated Protein Needs: 74-89grams/day (1.25-1.5grams/kg of IBW-59kg) -Due to obesity, age and PI  Estimated Fluid Needs: 1553-1694mL/day (1mL/kcal)    [x] Previous Nutrition Diagnosis:  #1 Increased nutrient needs (RESOLVED, no PI)    Nutrition Intervention: Meals and Snacks, Medical Food Supplement, Vitamin Supplement, Nutrition Related Medication, Coordination of Care  Indicator/Monitoring:  Monitor diet order, energy intake, food and nutrient intake, body composition, weight    Recommendations:  - continue DASH/TLC   - can discontinue prosource gelatein, patient does not need additional oral supplements no PI noted per WOCN assessment   - replete electrolytes as needed       Amargalyssa Arellano, #[id number] or via TEAMS  Patient is at LOW Risk, follow up x 7-10days

## 2023-04-28 NOTE — ADVANCED PRACTICE NURSE CONSULT - RECOMMEDATIONS
Cleanse wound with soap and water  Apply moisture barrier cream to wound and surrounding area twice a day  Apply Allevyn to bony prominence prophylactically  Maintain pressure injury prevention.   Offload heels  Keep skin clean.  Maintain incontinence care.   Monitor wound for changes and notify provider   Case discussed with RN

## 2023-04-28 NOTE — DISCHARGE NOTE PROVIDER - NSDCFUADDAPPT_GEN_ALL_CORE_FT
- [facility] clinic [phone] for aortic valve surgery   - [place] [facility] [address] [facility]   - GI MAP clinic at [address] [place] [address] [phone]  - Cardiology Dr. Russell

## 2023-04-28 NOTE — PROGRESS NOTE ADULT - ASSESSMENT
77 y.o Female with PMHx of HTN, Severe Aortic stenosis, atrial tachycardia, chronic venous stasis and OA BIBEMS for chief complaint of fall, having pre-op workup for TAVR.    # Severe AS   - TAVR will be done as outpatient, but pre-op will be performed in patient:   - CT angio Abdomen and pelvis TAVR w and CT angio Chest TAVR -->  < from: CT Angio Abdomen and Pelvis TAVR NAYELI w/wo IV Cont (04.25.23 @ 10:07) >  1.  Annulus area is 419 mm2, bi-plane diameter of elliptical cross   section 26 x 21 mm and annulus perimeter is 76 mm.  2. The smallest vessel diameter in the pelvis is 9 mm on the right and 8   mm on the left.  3. The smallest vessel diameter in the axilla is 6 mm on the right and 6   mm on the left.  4. Gallbladder air.. Pneumobilia. Correlate for prior instrumentation  5. Multiple subcentimeter and enlarged retroperitoneal and bilateral   iliac/inguinal lymph nodes. Findings suspicious for neoplastic process.   Further evaluation recommended.  6. Nonspecific wall thickening of the rectosigmoid.  7. 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 Dilated main pulmonary artery.  9. Enlarged and heterogeneous thyroid gland with multiple hypodense nodules  10. Pancreatic cysts. Recommend MR abdomen with and without contrast for   complete characterization.  - Carotid duplex: 20-39% stenosis in R internal carotid and 40-59% stenosis in left internal carotid   - cardiac cath --> once more stable as per Dr. Walker (contacted on 4/27, he believes patient is unstable for inpatient cardiac cath now, will be done outpatient)  - CT surgery planning to do the procedure outpatient after cardiac cath and colonoscopy done by GI. patient follow up in [facility] [phone].    #Pneumobilia  - found incidentally on CT scan  - no abdominal pain, soft abdomen on exam  - surgery consulted, recommended RUQ U/S & CT abdomen with Po contrast  - CT abd + pelvis with PO & IV contrast:   1.  Gallbladder air and trace pneumobilia is again noted, of uncertain   etiology. No cholecystoenteric fistula is identified.  2.  There is a questionable nodular liver contour, raising the possibility of early cirrhosis.  3.  There is blas-rectosigmoid inflammatory change suggesting proctocolitis.  4.  Anasarca is noted.  - RUQ ultrasound -->gallbladder not visible     # Chronic venous stasis   - Bilateral LE edema  - Takes Furosemide and metolazone   - Cont Furosemide IV 40mg qd  - Vascular and recommended no acute intervention   - Elevate Legs and ACE wrap them     #Watery diarrhea started on 4/26  # Acute Microcytic anemia  #Rectosigmoid thickening   - s/p 1 unit PRBC, H/Hl stable around ~ 7.5-8  - GI evaluation , pt was refusing EGD/C-scope, c/w PPI.   - spoke to patient again on 4/26 after CT findings of rectosigmoid thickening, said she agrees to colonoscopy if necessary prior to TAVR --> colonoscopy to be done outpatient as per GI  - C. diff negative    #Vaginal Bleed  - patient has history of bleeding per vagina, still ongoing   - GYN consult -->endometrial biopsy done on 4/26 -->f/u results   - TVUS: postmenopausal vaginal thickening 9mm    # Elevated Troponins   - Asymptomatic, no chest pain  - ECG: no ischemic changes   - c/w aspirin, statin and metoprolol  - will need LHC prior to AVR once more stable as per Dr. Walker      #MISC  -DVT: lovenox sq   -GI: PPI   -Diet: DASH  -Activity: IAT   -Code status: Full code (rediscussed with patient on 4/26)

## 2023-04-28 NOTE — DISCHARGE NOTE PROVIDER - HOSPITAL COURSE
77 y.o Female with PMHx of HTN, Aortic stenosis, atrial tachycardia, chronic venous stasis and OA BIBEMS for chief complaint of fall. Patient states she is here because her doctor told her to come in for evaluation of her legs which she states have been swelling up. She states she has been experiencing difficulty walking because of her legs being swollen and she feels a burning pain in her left ankle. Her legs are seeping fluid, and has bilateral ulcers. Denies any pain, head injury or being on any blood thinners. Pt denies any urinary or bowel incontinence , back or neck pain, numbness, tingling or focal weakness.    She was being treated with IV lasix, edema improved, and will be discharged on torsemide 20mg.     Regarding her severe AS, CT surgery was consulted and they recommended pre-op workup to be done inpatient:   - CT angio Abdomen and pelvis TAVR w and CT angio Chest TAVR -->  < from: CT Angio Abdomen and Pelvis TAVR NAYELI w/wo IV Cont (04.25.23 @ 10:07) >  1.  Annulus area is 419 mm2, bi-plane diameter of elliptical cross   section 26 x 21 mm and annulus perimeter is 76 mm.  2. The smallest vessel diameter in the pelvis is 9 mm on the right and 8   mm on the left.  3. The smallest vessel diameter in the axilla is 6 mm on the right and 6   mm on the left.  4. Gallbladder air.. Pneumobilia. Correlate for prior instrumentation  5. Multiple subcentimeter and enlarged retroperitoneal and bilateral   iliac/inguinal lymph nodes. Findings suspicious for neoplastic process.   Further evaluation recommended.  6. Nonspecific wall thickening of the rectosigmoid.  7. 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 Dilated main pulmonary artery.  9. Enlarged and heterogeneous thyroid gland with multiple hypodense nodules  10. Pancreatic cysts. Recommend MR abdomen with and without contrast for   complete characterization.  - Carotid duplex: 20-39% stenosis in R internal carotid and 40-59% stenosis in left internal carotid     **Surgery team consulted regarding pneumobilia, recommended RUQ U/S & CT abdomen with Po contrast  - CT abd + pelvis with PO & IV contrast:   1.  Gallbladder air and trace pneumobilia is again noted, of uncertain   etiology. No cholecystoenteric fistula is identified.  2.  There is a questionable nodular liver contour, raising the possibility of early cirrhosis.  3.  There is blas-rectosigmoid inflammatory change suggesting proctocolitis.  4.  Anasarca is noted.  - RUQ ultrasound -->gallbladder not visible     **GI team consulted for iron deficiency anemia and rectosigmoid thickening, colonoscopy recommended but will be done outpatient as they require cardiac clearance and cath first.     **Cardiology Dr. Turner contacted for elevated troponins and recommended cardiac cath to be done as patient is unstable now.     **Gynecology consulted for on and off 1 year hx of vaginal Bleed, TVUS: postmenopausal vaginal thickening 9mm, endometrial biopsy done on 4/26 -->results not out yet     ** CT surgery planning to do the procedure outpatient after cardiac cath and colonoscopy. Patient will be asked to follow up in Structural [facility] clinic [phone].

## 2023-04-28 NOTE — ADVANCED PRACTICE NURSE CONSULT - ASSESSMENT
History of Present Illness:   77 y.o Female with PMHx of HTN, Aortic stenosis, atrial tachycardia, chronic venous stasis and OA BIBEMS for chief complaint of fall. Patient states she is here because her doctor told her to come in for evaluation of her legs which she states have been swelling up. She did fall earlier today but she was sliding to the side of her couch and landed on her buttock. She states she has been experiencing difficulty walking because of her legs being swollen and she feels a burning pain in her left ankle. Her legs are seeping fluid, and has bilateral ulcers. Denies any pain, head injury or being on any blood thinners. Pt denies any urinary or bowel incontinence , back or neck pain, numbness, tingling or focal weakness.    Allergies and Intolerances:        Allergies:  	Allergy Status Unknown:     Home Medications:   * Patient Currently Takes Medications as of 21-Apr-2023 00:52 documented in Structured Notes  · 	furosemide 20 mg oral tablet: Last Dose Taken:  , 1 orally once a day  · 	metOLazone 2.5 mg oral tablet: Last Dose Taken:  , 1 orally 2 times a week    Patient History:    Past Medical, Past Surgical, and Family History:  PAST MEDICAL HISTORY:  Aortic stenosis   H/O atrial tachycardia   Hypertension   Osteoarthritis.   PAST SURGICAL HISTORY:  No significant past surgical history.   FAMILY HISTORY:  No pertinent family history in first degree relatives. No pertinent family history of: asthma.     Social History:  · Substance use	No    Patient received lying in bed. Alert and oriented.  Limited mobility. Incontinent of urine and stool. High risk for pressure injury.     Type of Wound: Healing scratch  Location: Left buttock  Measurements: ~2cmx0.1cm  Tunneling /Undermining: No  Wound bed: Dry healing scratch. No necrotic tissue.  Wound edges: Intact  Periwound: Intact  Wound exudate: No  Wound odor: No  Induration, erythema, warmth: No  Wound pain: No    No pressure injuries noted at time of assessment. Seen by vascular for lower extremity wounds.

## 2023-04-28 NOTE — PROGRESS NOTE ADULT - SUBJECTIVE AND OBJECTIVE BOX
JESÚS TERRELL  77y Female    CHIEF COMPLAINT:    Patient is a 77y old  Female who presents with a chief complaint of Fall (2023 09:16)      INTERVAL HPI/OVERNIGHT EVENTS:    Patient seen and examined. No sob. No cp.     ROS: All other systems are negative.    Vital Signs:    T(F): 97.3 (23 @ 05:24), Max: 97.4 (23 @ 19:18)  HR: 76 (23 @ 05:24) (75 - 76)  BP: 106/59 (23 @ 05:24) (104/57 - 111/55)  RR: 18 (23 @ 19:18) (17 - 18)  SpO2: --  I&O's Summary    2023 07:01  -  2023 07:00  --------------------------------------------------------  IN: 646 mL / OUT: 400 mL / NET: 246 mL      Daily     Daily Weight in k.4 (2023 05:24)  CAPILLARY BLOOD GLUCOSE          PHYSICAL EXAM:    GENERAL:  NAD  SKIN: No rashes or lesions  HENT: Atraumatic. Normocephalic. PERRL. Moist membranes.  NECK: Supple, No JVD. No lymphadenopathy.  PULMONARY: CTA B/L. No wheezing. No rales  CVS: Normal S1, S2. Rate and Rhythm are regular. A III/VI systolic mm over aortic area.   ABDOMEN/GI: Soft, Nontender, Nondistended; BS present  EXTREMITIES: Peripheral pulses intact. +ve edema B/L LE.  NEUROLOGIC:  No motor or sensory deficit.  PSYCH: Alert & oriented x 3    Consultant(s) Notes Reviewed:  [x ] YES  [ ] NO  Care Discussed with Consultants/Other Providers [ x] YES  [ ] NO    EKG reviewed  Telemetry reviewed    LABS:                        8.7    5.95  )-----------( 426      ( 2023 05:58 )             29.1         134<L>  |  96<L>  |  16  ----------------------------<  89  4.2   |  31  |  0.7    Ca    8.0<L>      2023 05:58  Mg     2.1         TPro  5.4<L>  /  Alb  2.3<L>  /  TBili  0.3  /  DBili  x   /  AST  38  /  ALT  21  /  AlkPhos  127<H>                RADIOLOGY & ADDITIONAL TESTS:      Imaging or report Personally Reviewed:  [ ] YES  [ ] NO    Medications:  Standing  atorvastatin 40 milliGRAM(s) Oral at bedtime  chlorhexidine 2% Cloths 1 Application(s) Topical <User Schedule>  enoxaparin Injectable 40 milliGRAM(s) SubCutaneous every 24 hours  furosemide   Injectable 40 milliGRAM(s) IV Push daily  metoprolol tartrate 12.5 milliGRAM(s) Oral two times a day  pantoprazole    Tablet 40 milliGRAM(s) Oral two times a day    PRN Meds      Case discussed with resident    Care discussed with pt/family

## 2023-04-28 NOTE — DISCHARGE NOTE NURSING/CASE MANAGEMENT/SOCIAL WORK - PATIENT PORTAL LINK FT
You can access the FollowMyHealth Patient Portal offered by [facility] by registering at the following website: [email or web address]. By joining [employer]’s FollowMyHealth portal, you will also be able to view your health information using other applications (apps) compatible with our system.

## 2023-04-28 NOTE — DISCHARGE NOTE NURSING/CASE MANAGEMENT/SOCIAL WORK - NSDCFUADDAPPT_GEN_ALL_CORE_FT
- Structural Heart clinic [phone] for aortic valve surgery   - [place] for women[facility] [address] [facility]   - GI MAP clinic at [address] [place] [address] [phone]  - Cardiology Dr. Russell

## 2023-04-29 ENCOUNTER — TRANSCRIPTION ENCOUNTER (OUTPATIENT)
Age: 78
End: 2023-04-29

## 2023-05-02 DIAGNOSIS — R31.29 OTHER MICROSCOPIC HEMATURIA: ICD-10-CM

## 2023-05-02 DIAGNOSIS — E04.2 NONTOXIC MULTINODULAR GOITER: ICD-10-CM

## 2023-05-02 DIAGNOSIS — L97.919 NON-PRESSURE CHRONIC ULCER OF UNSPECIFIED PART OF RIGHT LOWER LEG WITH UNSPECIFIED SEVERITY: ICD-10-CM

## 2023-05-02 DIAGNOSIS — I45.2 BIFASCICULAR BLOCK: ICD-10-CM

## 2023-05-02 DIAGNOSIS — Z91.81 HISTORY OF FALLING: ICD-10-CM

## 2023-05-02 DIAGNOSIS — D50.0 IRON DEFICIENCY ANEMIA SECONDARY TO BLOOD LOSS (CHRONIC): ICD-10-CM

## 2023-05-02 DIAGNOSIS — I49.1 ATRIAL PREMATURE DEPOLARIZATION: ICD-10-CM

## 2023-05-02 DIAGNOSIS — M19.90 UNSPECIFIED OSTEOARTHRITIS, UNSPECIFIED SITE: ICD-10-CM

## 2023-05-02 DIAGNOSIS — N95.0 POSTMENOPAUSAL BLEEDING: ICD-10-CM

## 2023-05-02 DIAGNOSIS — K51.30 ULCERATIVE (CHRONIC) RECTOSIGMOIDITIS WITHOUT COMPLICATIONS: ICD-10-CM

## 2023-05-02 DIAGNOSIS — I87.2 VENOUS INSUFFICIENCY (CHRONIC) (PERIPHERAL): ICD-10-CM

## 2023-05-02 DIAGNOSIS — I27.20 PULMONARY HYPERTENSION, UNSPECIFIED: ICD-10-CM

## 2023-05-02 DIAGNOSIS — I87.8 OTHER SPECIFIED DISORDERS OF VEINS: ICD-10-CM

## 2023-05-02 DIAGNOSIS — K82.8 OTHER SPECIFIED DISEASES OF GALLBLADDER: ICD-10-CM

## 2023-05-02 DIAGNOSIS — I50.23 ACUTE ON CHRONIC SYSTOLIC (CONGESTIVE) HEART FAILURE: ICD-10-CM

## 2023-05-02 DIAGNOSIS — I95.9 HYPOTENSION, UNSPECIFIED: ICD-10-CM

## 2023-05-02 DIAGNOSIS — I11.0 HYPERTENSIVE HEART DISEASE WITH HEART FAILURE: ICD-10-CM

## 2023-05-02 DIAGNOSIS — R63.4 ABNORMAL WEIGHT LOSS: ICD-10-CM

## 2023-05-02 DIAGNOSIS — I21.A1 MYOCARDIAL INFARCTION TYPE 2: ICD-10-CM

## 2023-05-02 DIAGNOSIS — L97.929 NON-PRESSURE CHRONIC ULCER OF UNSPECIFIED PART OF LEFT LOWER LEG WITH UNSPECIFIED SEVERITY: ICD-10-CM

## 2023-05-02 DIAGNOSIS — I65.23 OCCLUSION AND STENOSIS OF BILATERAL CAROTID ARTERIES: ICD-10-CM

## 2023-05-02 DIAGNOSIS — K86.2 CYST OF PANCREAS: ICD-10-CM

## 2023-05-02 DIAGNOSIS — R19.7 DIARRHEA, UNSPECIFIED: ICD-10-CM

## 2023-05-02 DIAGNOSIS — F03.90 UNSPECIFIED DEMENTIA WITHOUT BEHAVIORAL DISTURBANCE: ICD-10-CM

## 2023-05-02 DIAGNOSIS — E83.42 HYPOMAGNESEMIA: ICD-10-CM

## 2023-05-02 DIAGNOSIS — E78.00 PURE HYPERCHOLESTEROLEMIA, UNSPECIFIED: ICD-10-CM

## 2023-05-02 DIAGNOSIS — R26.2 DIFFICULTY IN WALKING, NOT ELSEWHERE CLASSIFIED: ICD-10-CM

## 2023-05-02 DIAGNOSIS — Z20.822 CONTACT WITH AND (SUSPECTED) EXPOSURE TO COVID-19: ICD-10-CM

## 2023-05-02 DIAGNOSIS — K74.60 UNSPECIFIED CIRRHOSIS OF LIVER: ICD-10-CM

## 2023-05-02 DIAGNOSIS — R59.0 LOCALIZED ENLARGED LYMPH NODES: ICD-10-CM

## 2023-05-02 DIAGNOSIS — N39.0 URINARY TRACT INFECTION, SITE NOT SPECIFIED: ICD-10-CM

## 2023-05-02 DIAGNOSIS — I35.0 NONRHEUMATIC AORTIC (VALVE) STENOSIS: ICD-10-CM

## 2023-05-03 ENCOUNTER — TRANSCRIPTION ENCOUNTER (OUTPATIENT)
Age: 78
End: 2023-05-03

## 2023-05-03 LAB — SURGICAL PATHOLOGY STUDY: SIGNIFICANT CHANGE UP

## 2023-05-04 ENCOUNTER — APPOINTMENT (OUTPATIENT)
Dept: CARDIOLOGY | Facility: CLINIC | Age: 78
End: 2023-05-04
Payer: MEDICARE

## 2023-05-04 DIAGNOSIS — Z00.00 ENCOUNTER FOR GENERAL ADULT MEDICAL EXAMINATION W/OUT ABNORMAL FINDINGS: ICD-10-CM

## 2023-05-04 PROCEDURE — 99214 OFFICE O/P EST MOD 30 MIN: CPT

## 2023-05-04 RX ORDER — TORSEMIDE 10 MG/ML
20 INJECTION, SOLUTION INTRAVENOUS
Refills: 0 | Status: ACTIVE | COMMUNITY

## 2023-05-04 RX ORDER — FERROUS SULFATE TAB EC 325 MG (65 MG FE EQUIVALENT) 325 (65 FE) MG
325 (65 FE) TABLET DELAYED RESPONSE ORAL
Refills: 0 | Status: ACTIVE | COMMUNITY

## 2023-05-04 RX ORDER — LORATADINE 5 MG
17 TABLET,CHEWABLE ORAL
Refills: 0 | Status: ACTIVE | COMMUNITY

## 2023-05-04 RX ORDER — ACETAMINOPHEN 325 MG/1
325 TABLET ORAL
Refills: 0 | Status: ACTIVE | COMMUNITY

## 2023-05-04 RX ORDER — ATORVASTATIN CALCIUM 40 MG/1
40 TABLET, FILM COATED ORAL
Refills: 0 | Status: ACTIVE | COMMUNITY

## 2023-05-04 RX ORDER — METOPROLOL SUCCINATE 25 MG/1
25 TABLET, EXTENDED RELEASE ORAL
Refills: 0 | Status: ACTIVE | COMMUNITY

## 2023-05-04 RX ORDER — DOCUSATE SODIUM 100 MG
100 TABLET ORAL
Refills: 0 | Status: ACTIVE | COMMUNITY

## 2023-05-04 RX ORDER — PANTOPRAZOLE 40 MG/1
40 TABLET, DELAYED RELEASE ORAL
Refills: 0 | Status: ACTIVE | COMMUNITY

## 2023-05-04 NOTE — CHART NOTE - NSCHARTNOTESELECT_GEN_ALL_CORE
Telephone/Event Note
CT Surgery/Event Note
Event Note
Transfer Note
Transfer Note
med rec/Event Note

## 2023-05-04 NOTE — CHART NOTE - NSCHARTNOTEFT_GEN_A_CORE
PGY 1 Note    Attempted to call patient to discuss the results of her endometrial biopsy and cervical tissue biopsy. Phone number listed for patient not currently in service. Attempted to call listed emergency contact. Voicemail left with instructions for emergency contact and callback number.     Will continue to try to contact patient.    Dr. Cisneros and Dr. Hurst to be aware PGY 1 Note    Attempted to call patient to discuss the results of her endometrial biopsy and cervical tissue biopsy. Phone number listed for patient not currently in service.     Called emergency contact who is patient daughter and legal guardian. Pt is currently living in nursing home. 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 Discussed results with EC and informed her that we will assist in scheduling GYN/ONC follow up. Daughter will fax guardianship paperwork to L&D for confirmation.     Daughter's cellphone: [phone]    Dr. Cisneros and Dr. Hurst to be aware PGY 1 Note    Attempted to call patient to discuss the results of her endometrial biopsy and cervical tissue biopsy. Phone number listed for patient not currently in service.     Called emergency contact who is patient daughter and legal guardian. Pt is currently living in nursing home. 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 Discussed results with EC and informed her that we will assist in scheduling GYN/ONC follow up. Daughter will fax guardianship paperwork to L&D for confirmation.     Daughter's cellphone: [phone]    Dr. Cisneros and Dr. Hurst to be aware

## 2023-05-05 VITALS
SYSTOLIC BLOOD PRESSURE: 132 MMHG | HEART RATE: 65 BPM | DIASTOLIC BLOOD PRESSURE: 72 MMHG | OXYGEN SATURATION: 95 % | RESPIRATION RATE: 14 BRPM

## 2023-05-05 VITALS — BODY MASS INDEX: 32.99 KG/M2 | HEIGHT: 65 IN | WEIGHT: 198 LBS

## 2023-05-10 ENCOUNTER — TRANSCRIPTION ENCOUNTER (OUTPATIENT)
Age: 78
End: 2023-05-10

## 2023-05-11 ENCOUNTER — EMERGENCY (EMERGENCY)
Facility: HOSPITAL | Age: 78
LOS: 0 days | Discharge: HOME | End: 2023-05-12
Attending: EMERGENCY MEDICINE
Payer: MEDICARE

## 2023-05-11 VITALS
WEIGHT: 149.91 LBS | DIASTOLIC BLOOD PRESSURE: 57 MMHG | HEIGHT: 61 IN | OXYGEN SATURATION: 99 % | SYSTOLIC BLOOD PRESSURE: 101 MMHG | HEART RATE: 85 BPM | RESPIRATION RATE: 18 BRPM | TEMPERATURE: 98 F

## 2023-05-11 VITALS
DIASTOLIC BLOOD PRESSURE: 58 MMHG | OXYGEN SATURATION: 96 % | HEART RATE: 75 BPM | RESPIRATION RATE: 18 BRPM | TEMPERATURE: 97 F | SYSTOLIC BLOOD PRESSURE: 121 MMHG

## 2023-05-11 DIAGNOSIS — Z86.79 PERSONAL HISTORY OF OTHER DISEASES OF THE CIRCULATORY SYSTEM: ICD-10-CM

## 2023-05-11 DIAGNOSIS — M19.90 UNSPECIFIED OSTEOARTHRITIS, UNSPECIFIED SITE: ICD-10-CM

## 2023-05-11 DIAGNOSIS — N95.0 POSTMENOPAUSAL BLEEDING: ICD-10-CM

## 2023-05-11 DIAGNOSIS — C54.1 MALIGNANT NEOPLASM OF ENDOMETRIUM: ICD-10-CM

## 2023-05-11 DIAGNOSIS — I35.0 NONRHEUMATIC AORTIC (VALVE) STENOSIS: ICD-10-CM

## 2023-05-11 DIAGNOSIS — I10 ESSENTIAL (PRIMARY) HYPERTENSION: ICD-10-CM

## 2023-05-11 DIAGNOSIS — N93.9 ABNORMAL UTERINE AND VAGINAL BLEEDING, UNSPECIFIED: ICD-10-CM

## 2023-05-11 LAB
ALBUMIN SERPL ELPH-MCNC: 3.1 G/DL — LOW (ref 3.5–5.2)
ALP SERPL-CCNC: 235 U/L — HIGH (ref 30–115)
ALT FLD-CCNC: 20 U/L — SIGNIFICANT CHANGE UP (ref 0–41)
ANION GAP SERPL CALC-SCNC: 12 MMOL/L — SIGNIFICANT CHANGE UP (ref 7–14)
APTT BLD: 33.2 SEC — SIGNIFICANT CHANGE UP (ref 27–39.2)
AST SERPL-CCNC: 36 U/L — SIGNIFICANT CHANGE UP (ref 0–41)
BASOPHILS # BLD AUTO: 0.03 K/UL — SIGNIFICANT CHANGE UP (ref 0–0.2)
BASOPHILS NFR BLD AUTO: 0.6 % — SIGNIFICANT CHANGE UP (ref 0–1)
BILIRUB SERPL-MCNC: 0.4 MG/DL — SIGNIFICANT CHANGE UP (ref 0.2–1.2)
BUN SERPL-MCNC: 23 MG/DL — HIGH (ref 10–20)
CALCIUM SERPL-MCNC: 8.8 MG/DL — SIGNIFICANT CHANGE UP (ref 8.4–10.5)
CHLORIDE SERPL-SCNC: 98 MMOL/L — SIGNIFICANT CHANGE UP (ref 98–110)
CO2 SERPL-SCNC: 26 MMOL/L — SIGNIFICANT CHANGE UP (ref 17–32)
CREAT SERPL-MCNC: 1 MG/DL — SIGNIFICANT CHANGE UP (ref 0.7–1.5)
EGFR: 58 ML/MIN/1.73M2 — LOW
EOSINOPHIL # BLD AUTO: 0.21 K/UL — SIGNIFICANT CHANGE UP (ref 0–0.7)
EOSINOPHIL NFR BLD AUTO: 3.9 % — SIGNIFICANT CHANGE UP (ref 0–8)
GAS PNL BLDV: SIGNIFICANT CHANGE UP
GLUCOSE SERPL-MCNC: 103 MG/DL — HIGH (ref 70–99)
HCT VFR BLD CALC: 32 % — LOW (ref 37–47)
HGB BLD-MCNC: 9.8 G/DL — LOW (ref 12–16)
IMM GRANULOCYTES NFR BLD AUTO: 0.6 % — HIGH (ref 0.1–0.3)
INR BLD: 1.1 RATIO — SIGNIFICANT CHANGE UP (ref 0.65–1.3)
LYMPHOCYTES # BLD AUTO: 1.15 K/UL — LOW (ref 1.2–3.4)
LYMPHOCYTES # BLD AUTO: 21.2 % — SIGNIFICANT CHANGE UP (ref 20.5–51.1)
MCHC RBC-ENTMCNC: 25.1 PG — LOW (ref 27–31)
MCHC RBC-ENTMCNC: 30.6 G/DL — LOW (ref 32–37)
MCV RBC AUTO: 82.1 FL — SIGNIFICANT CHANGE UP (ref 81–99)
MONOCYTES # BLD AUTO: 0.71 K/UL — HIGH (ref 0.1–0.6)
MONOCYTES NFR BLD AUTO: 13.1 % — HIGH (ref 1.7–9.3)
NEUTROPHILS # BLD AUTO: 3.29 K/UL — SIGNIFICANT CHANGE UP (ref 1.4–6.5)
NEUTROPHILS NFR BLD AUTO: 60.6 % — SIGNIFICANT CHANGE UP (ref 42.2–75.2)
NRBC # BLD: 0 /100 WBCS — SIGNIFICANT CHANGE UP (ref 0–0)
PLATELET # BLD AUTO: 521 K/UL — HIGH (ref 130–400)
PMV BLD: 9 FL — SIGNIFICANT CHANGE UP (ref 7.4–10.4)
POTASSIUM SERPL-MCNC: 5.6 MMOL/L — HIGH (ref 3.5–5)
POTASSIUM SERPL-SCNC: 5.6 MMOL/L — HIGH (ref 3.5–5)
PROT SERPL-MCNC: 7.2 G/DL — SIGNIFICANT CHANGE UP (ref 6–8)
PROTHROM AB SERPL-ACNC: 12.6 SEC — SIGNIFICANT CHANGE UP (ref 9.95–12.87)
RBC # BLD: 3.9 M/UL — LOW (ref 4.2–5.4)
RBC # FLD: 22.9 % — HIGH (ref 11.5–14.5)
SODIUM SERPL-SCNC: 136 MMOL/L — SIGNIFICANT CHANGE UP (ref 135–146)
WBC # BLD: 5.42 K/UL — SIGNIFICANT CHANGE UP (ref 4.8–10.8)
WBC # FLD AUTO: 5.42 K/UL — SIGNIFICANT CHANGE UP (ref 4.8–10.8)

## 2023-05-11 PROCEDURE — 83605 ASSAY OF LACTIC ACID: CPT

## 2023-05-11 PROCEDURE — 76830 TRANSVAGINAL US NON-OB: CPT

## 2023-05-11 PROCEDURE — 85018 HEMOGLOBIN: CPT

## 2023-05-11 PROCEDURE — 82330 ASSAY OF CALCIUM: CPT

## 2023-05-11 PROCEDURE — 85014 HEMATOCRIT: CPT

## 2023-05-11 PROCEDURE — 84295 ASSAY OF SERUM SODIUM: CPT | Mod: XU

## 2023-05-11 PROCEDURE — 85025 COMPLETE CBC W/AUTO DIFF WBC: CPT

## 2023-05-11 PROCEDURE — 36415 COLL VENOUS BLD VENIPUNCTURE: CPT

## 2023-05-11 PROCEDURE — 84132 ASSAY OF SERUM POTASSIUM: CPT

## 2023-05-11 PROCEDURE — 85610 PROTHROMBIN TIME: CPT

## 2023-05-11 PROCEDURE — 99285 EMERGENCY DEPT VISIT HI MDM: CPT | Mod: FS

## 2023-05-11 PROCEDURE — 99284 EMERGENCY DEPT VISIT MOD MDM: CPT | Mod: 25

## 2023-05-11 PROCEDURE — 80053 COMPREHEN METABOLIC PANEL: CPT

## 2023-05-11 PROCEDURE — 99283 EMERGENCY DEPT VISIT LOW MDM: CPT

## 2023-05-11 PROCEDURE — 76830 TRANSVAGINAL US NON-OB: CPT | Mod: 26

## 2023-05-11 PROCEDURE — 82803 BLOOD GASES ANY COMBINATION: CPT

## 2023-05-11 PROCEDURE — 85730 THROMBOPLASTIN TIME PARTIAL: CPT

## 2023-05-11 NOTE — ED PROVIDER NOTE - CARE PROVIDER_API CALL
Shavon Mcdaniels (MD)  Gynecologic Oncology; Obstetrics and Gynecology  [address] [place] [address]  Phone: [phone]  Fax: [phone]  Follow Up Time:

## 2023-05-11 NOTE — ED ADULT NURSE NOTE - NS PRO PASSIVE SMOKE EXP
ACC REVIEW REPORT: [facility]        PATIENT NAME: Aura Fischer    PATIENT ID: [id number]    BED: S367    BED TYPE: TELE    BED GIVEN TO: RAJEEV ROSE RN    TIME BED GIVEN: 2244    YOB: 1942    AGE: 77    GENDER: FEMALE    PREVIOUS ADMIT TO [facility]: YES    PREVIOUS ADMISSION DATE: 3/14/16    PATIENT CLASS: OUTPATIENT    TODAY'S DATE: 11/12/2019    TRANSFER DATE: 11/12/19    ETA:     TRANSFERRING FACILITY: [facility]     TRANSFERRING FACILITY PHONE # : [phone]    TRANSFERRING MD: JEN    DATE/TIME REQUEST RECEIVED: 11/12/19 @ 2121    [facility] RN: LEMUEL ORANTES RN    REPORT FROM: RAJEEV ROSE RN    TIME REPORT TAKEN: 2244    DIAGNOSIS: ENCEPHALOPATHY    REASON FOR TRANSFER TO [facility]: HIGHER LEVEL OF CARE    TRANSPORTATION: AMBULANCE    CLINICAL REASON FOR TRANSFER TO [facility]: HIGHER LEVEL OF CARE      CLINICAL INFORMATION    HEIGHT:     WEIGHT:     ALLERGIES:   SULFA DRUGS    KHALIL: NO    INFECTIOUS DISEASE: NONE    ISOLATION: NONE        LAST VITAL SIGNS:  TIME:   TEMP: 98.3  PULSE: 94  B/P: 180/90  RESP: 20    LAB INFORMATION:   LACTATE 2.3  WBC 13.9  PH 7.48  PO2 85.5  PCO2 26.0    CULTURE INFORMATION: DRAWN NOT RESULTED    MEDS/IV FLUIDS:   #18 LEFT AC  #20 RIGHT AC  ZOSYN  ZOFRAN  NS  METOPROLOL  TYLENOL      CARDIAC SYSTEM:    CHEST PAIN: NO    RATE:     SCALE:     RHYTHM: NSR    Is patient taking or has patient been given any drugs that could increase bleeding? UNKNOWN  (Plavix, Brilinta, Effient, Eliquis, Xarelto, Warfarin, Integrilin, Angiomax)    DRUG:      DOSE/FREQUENCY:                 RESPIRATORY SYSTEM:    LUNG SOUNDS:    CLEAR:   CRACKLES:   WHEEZES:   RHONCHI:   DIMINISHED: YES        OXYGEN: ROOM AIR    O2 SAT: 95%          RADIOLOGY RESULTS: NEG    RESPIRATORY STATUS: STABLE      CNS/MUSCULOSKELETAL      ARIELLE COMA SCALE:    E: 2  M: 4  V: 2    LAST KNOWN WELL: Akash 11/10/19        NIHSS    Survey Item  0: Means Alert  1: Drowsy or Answer Correctly  2: Incorrect,  Forced, Can't Resist Gravity  3: Complete or No Effort  4: No Movement  NT: Not Testable Acceptable As Noted Above    1A: Level of Consciousness (alert, drowsy, etc) : 1    1B: LOC Questions (month, age) : 2    1C: LOC Commands (open/close eyes, make a fist & let go): 2    2:  Best Gaze (eyes open-pt follows examiner's fingers or face): 0    3:  Visual (introduce visual stimulus/threat to pt's visual field quad. Cover 1 eye and hold up fingers in all 4 quadrants) : 0    4.  Facial Palsy (show teeth, raise eyebrows and squeeze eyes tightly shut): 1    5A: Motor Arm-Left (elevate extremity to 90 degrees and score drift/movement.  Count to 10 aloud and use fingers for visual cue): 1    5B:  Motor Arm-Right (elevate extremity to 90 degrees and score drift/movement.  Count to 10 aloud and use fingers for visual cue): 1    6A:  Motor Leg-Left (elevate extremity to 30 degrees and score drift/movement.  Count to 5 out loud and use fingers for visual cue): 1    6B:  Motor Leg-Right (elevate extremity to 30 degrees and score drift/movement.  Count to 5 out loud and use fingers for visual cue): 1    7:  Limb Ataxia- finger to nose, heel down shin: 0    8:  Sensory- pin prick to face, arms, trunk, and legs. Compare sharpness side to side: 0    9:  Best Language- name, items, describe picture, and read sentences.  Do not forget glasses if they normally wear them: 2    10: Dysarthria- elevate speech clarity by pt reading or repeating words on a list: 2    11: Extinction and Inattention- Use information from prior testing or double simultaneous stimuli testing to identify neglect. Face, arms, legs and visual field: 2    Total NIHSS Score: 16  Date: 11/12/19  Time of NIHSS Assessment:           CAT SCAN RESULTS: NEG      CNS/MUSCULOSKELETAL NOTES:       GI//GY      ABDOMINAL PAIN: NO    VOMITING: YES    DIARRHEA: NO    NAUSEA: YES    BOWEL SOUNDS: ACTIVE    OCCULT STOOL: NO    VAGINAL BLEEDING: NO    TESTICULAR PAIN:  N/A    HEMATURIA: NO      PAST MEDICAL HISTORY:   Diagnosis Date Comment Source   Diverticulosis   Provider   Internal hemorrhoid      DM  CHOLESTEROL  HTN  PANCREATITIS  HYPOTHYROIDISM  HYSTERECTOMY   CHOLECYSTECTOMY  BREAST BIOPSY  CATARACT  REMOVAL OF PANCREAS TAIL       Past Surgical History      Laterality Last Occurrence Comments   Hemorrhoid surgery [[id number]]          OTHER SYMPTOM NOTES:   LAST KNOWN WELL WAS ON Sunday  (NORMAL WALKY, TALKY)  NOW NOT TALKING, RIGHT FACIAL DROOP  LIMB DRIFTS      ADDITIONAL NOTES:           Viji Elder RN  11/12/2019  10:39 PM     Unknown

## 2023-05-11 NOTE — ED ADULT TRIAGE NOTE - CHIEF COMPLAINT QUOTE
Pt BIBA from [facility] for "vaginal bleeding with big clot -meausres [id number]". Pt states she has been bleeding vaginally for a while. Denies abdominal pain.

## 2023-05-11 NOTE — ED CLERICAL - NS ED CARE COORDINATION INFORMATION
This patient is enrolled in the STAR readmission reduction initiative and has active care navigation. This patient can be followed up by the care navigation team within 24 hours.  To arrange close follow-up or to obtain additional clinical information, please call the contact number above. The on-call [facility] Hospitalist has been notified and will coordinate care in concert with the ED Physician including consults as necessary. Please reach out to the Hospitalist on-call directly (schedule on AMiON posted on the intranet). You may also call the Hospitalist Division at [phone] at either site. Consider CDU for management per guideline.

## 2023-05-11 NOTE — ED ADULT NURSE NOTE - DISCHARGE DATE/TIME
Patient is here today for Nurse visit blood pressure check. Patient is doing well with the new medication Benicar no complaints. Patient's BP today is 125/72 left arm adult cuff. Patient was advise to    continue medications and keep follow up appointment. Patient verbalized understanding.
12-May-2023 01:11

## 2023-05-11 NOTE — ED PROVIDER NOTE - PATIENT PORTAL LINK FT
You can access the FollowMyHealth Patient Portal offered by [facility] by registering at the following website: [email or web address]. By joining [employer]’s FollowMyHealth portal, you will also be able to view your health information using other applications (apps) compatible with our system.

## 2023-05-11 NOTE — ED ADULT NURSE NOTE - NS ED NRS NURSING HOMES
[facility] and [facility], [facility]

## 2023-05-11 NOTE — ED PROVIDER NOTE - ATTENDING CONTRIBUTION TO CARE
78 yo f with pmh of AS, htn, OA, intermittent vaginal bleeding, recently dx with endometrial ca from biopsy, sent from rehab for vaginal clot expelled.  pt says she continues to have intermittent vaginal bleeding, no abd pain.  daughter asked rehab to send pt to ED as per pt.  exam: nad, ncat, perrl, eomi, mmm, rrr, ctab, abd soft, nt, nd aox3, imp: pt with increased vaginal bleeding, will check labs, US, gyn/onc consult

## 2023-05-11 NOTE — ED PROVIDER NOTE - OBJECTIVE STATEMENT
Pt care assumed this am, report endorsed by night RN, rounded with Dr Isaura Girard and Dr Erick Boateng. Orders noted from Dr Evert Choudhury. Pt had brief period of alertness, now lethargic and needs to be awaken by touch.  Oxygen at 3.5 liters increased to 5 liters due to his pt sent by NH 2/2 episode of vaginal bleeding with passage of large clot. recent DC from hospital and dx with endometrial ca, has f/u appt 5/16 with gyn onc. Denies fever/chill/HA/dizziness/chest pain/palpitation/sob/abd pain/n/v/d/ black stool/bloody stool/urinary sxs

## 2023-05-11 NOTE — ED PROVIDER NOTE - PROGRESS NOTE DETAILS
cleared by gyn to f/u outpt as sched tpc with pts daughter kale who req pt be admitted until her gyn onc appt on 5/16, but advised this was not an adequate reason for admission. advised that she was cleared by gyn and that her h/h was better than last admission. she also is not being dc home, but to NH, a [facility] facility which is able to take care of her and send to ED for any issues. vag bleeding may occur and we know the reason why, if any change, she would be brought back to ED . she was offered admission for social reasons/to poss change NH if she is not happy with her current one or feels she is not being cared for properly but she declined. I then asked if there is any other way I can help her and she said no and that I did not help her with anything and she hung up. This was a difficult encounter. The patient's family member expectations as to management were not able to be met given the guidelines by the state, hospital guidelines, and the patient's personal care guidelines. I discussed at length with the patients daughter my concerns and offered treatment options that were in keeping with our policies and procedures. Unfortunately, this did not alleviate her concerns. I remained respectful , attempted to solve her issue within our constraints, and treated the patient with due courtesy.

## 2023-05-11 NOTE — CONSULT NOTE ADULT - ASSESSMENT
77y P2, postmenopausal with extensive medical history, with vaginal bleeding secondary to known endometrioid adenocarcinoma, hemodynamically and clinically stable.     - No acute gyn intervention   - Despite communication with patient's daughter by Gyn team on the phone, patient was unaware of her cancer diagnosis. Today, patient was informed of pathology findings. Discussed the importance of outpatient follow-up with gynecology-oncology  Patient is aware that she has an initial appointment on 5/16/23 with Dr. Mcdaniels.    - Recommend outpatient gyn-onc care and continued follow-up  - Dispo per ED.     Dr. Trejo and Dr. Hurst aware.

## 2023-05-11 NOTE — ED PROVIDER NOTE - CLINICAL SUMMARY MEDICAL DECISION MAKING FREE TEXT BOX
sangeethak: received sign out from Dr. Francisco. Patient with vaginal bleeding in the setting of known endometrial cancer.  Hemodynamically stable, hemoglobin stable, asymptomatic on my reassessment.  Discussed that needs OB/GYN follow-up.  Return precaution discussed

## 2023-05-11 NOTE — ED PROVIDER NOTE - NS ED ATTENDING STATEMENT MOD
I have seen and examined this patient and fully participated in the care of this patient as the teaching attending.  The service was shared with the ABBE.  I reviewed and verified the documentation and independently performed the documented:

## 2023-05-11 NOTE — ED ADULT NURSE NOTE - CHIEF COMPLAINT QUOTE
Pt BIBA from [facility] for "vaginal bleeding with big clot -meausres [id number]". Pt states she has been bleeding vaginally for a while. Denies abdominal pain.

## 2023-05-11 NOTE — ED ADULT NURSE NOTE - OBJECTIVE STATEMENT
ALVIN [facility] c/o intermittent vaginal bleeding that started "a while ago", as per pt. Pt denies abdominal and vaginal pain.

## 2023-05-11 NOTE — CONSULT NOTE ADULT - SUBJECTIVE AND OBJECTIVE BOX
Chief Complaint: postmenopausal bleeding    HPI: 76yo P2 postmenopausal PMHx of HTN, Aortic stenosis, atrial tachycardia, chronic venous stasis and OA presenting to ED for continued vaginal bleeding. Patient known to Gyn team from inpatient consult on 2023 for vaginal bleeding.  Cervical biopsy remarkable for endometrioid adenocarcinoma. Patient reports that vaginal bleeding has continued since that time, denies worsening of vaginal bleeding.  States her daughter advised rehab facility to bring her to ED for evaluation. Per patient, bleeding is noted when wiping, not passing large clots or having large gushes of blood.  Reports some weight loss over the past year.  Denies abdominal pain, constipation, diarrhea, difficulty urinating, denies night sweats, dysuria. Denies fevers, chills. She has not presented to a GYN in the last 10 years. Patient has scheduled appointment with Dr. Mcdaniels, GynOnc, on May 16, 2023.     Ob/Gyn History:  ObHx: P2, H/O 2 FT     GynHx: Denies history of ovarian cysts, uterine fibroids, abnormal paps, or STIs  Last Pap Smear - 10 years ago  Last Mammogram - 5 years ago  Last Colonoscopy - 5 years ago    Denies the following: constitutional symptoms, visual symptoms, cardiovascular symptoms, respiratory symptoms, GI symptoms, musculoskeletal symptoms, skin symptoms, neurologic symptoms, hematologic symptoms, allergic symptoms, psychiatric symptoms  Except any pertinent positives listed.     Home Medications:  furosemide 20 mg oral tablet: 1 orally once a day (2023 00:52)  metOLazone 2.5 mg oral tablet: 1 orally 2 times a week (2023 00:52)    Allergy Status Unknown    PAST MEDICAL & SURGICAL HISTORY:  H/O atrial tachycardia  Osteoarthritis  Hypertension  Aortic stenosis  No significant past surgical history    FAMILY HISTORY:  No pertinent family history in first degree relatives    SOCIAL HISTORY: Denies cigarette use, alcohol use, or illicit drug use      Vital Signs Last 24 Hrs  T(F): 98.5 (11 May 2023 16:16), Max: 98.5 (11 May 2023 16:16)  HR: 85 (11 May 2023 16:16) (85 - 85)  BP: 101/57 (11 May 2023 16:16) (101/57 - 101/57)  RR: 18 (11 May 2023 16:16) (18 - 18)  Height (cm): 154.9 (23 @ 16:16)  Weight (kg): 68 (23 @ 16:16)  BMI (kg/m2): 28.3 (23 @ 16:16)  BSA (m2): 1.67 (23 @ 16:16)    UO:     General Appearance - AAOx3, NAD  Heart - S1S2 regular rate and rhythm  Lung - CTA Bilaterally  Abdomen - Soft, nontender, nondistended, no rebound, no rigidity, no guarding, bowel sounds present    GYN/Pelvis:  External Genitalia: normal external female genitalia  Vaginal: normal vaginal mucosa, 5cc thin red blood in vagina, no active extravasation on valsalva, no clots.   Cervix: unable to visualize, irregular contour, friable   Uterus: unable to palpate on bimanual exam due to body habitus   Adnexa: nontender, limited palpable exam due to body habitus    Meds:     Height (cm): 154.9 (23 @ 16:16)  Weight (kg): 68 (23 @ 16:16)  BMI (kg/m2): 28.3 (23 @ 16:16)  BSA (m2): 1.67 (23 @ 16:16)    LABS:                        9.8    5.42  )-----------( 521      ( 11 May 2023 18:42 )             32.0             136  |  98  |  23<H>  ----------------------------<  103<H>  5.6<H>   |  26  |  1.0    Ca    8.8      11 May 2023 18:42    TPro  7.2  /  Alb  3.1<L>  /  TBili  0.4  /  DBili  x   /  AST  36  /  ALT  20  /  AlkPhos  235<H>      PT/INR - ( 11 May 2023 18:42 )   PT: 12.60 sec;   INR: 1.10 ratio         PTT - ( 11 May 2023 18:42 )  PTT:33.2 sec        RADIOLOGY & ADDITIONAL STUDIES:    < from: US Transvaginal (23 @ 19:52) >  ACC: [id number] EXAM:  US TRANSVAGINAL   ORDERED BY: BESA BARDHI     PROCEDURE DATE:  2023          INTERPRETATION:  CLINICAL INFORMATION: Vaginal bleeding. Recent diagnosis   of endometrial cancer.    LMP: Postmenopausal.    COMPARISON: 2023.    TECHNIQUE:  Endovaginal pelvic sonogram only. Please note, per technologist   examination is markedly limited as patient was unable to cooperate with   exam.    FINDINGS:  Uterus: 9.3 x 3.8 x 5.2. Endometrium is thickened, measuring   approximately 1.6 cm, with a focal area of increased flow, measuring 1.5   x 1.3 x 1.5 cm, suspicious for known endometrial neoplasm with associated   blood clot.    Right ovary: Not visualized.  Left ovary: Not visualized.      IMPRESSION:    1. Limitedexam demonstrating a thickened endometrium, measuring   approximately 1.6 cm, with a focal area of increased flow, suspicious for   known endometrial neoplasm with associated blood clot.        --- End of Report ---            CHANDA LOCKHART MD; Attending Radiologist  This document has been electronically signed. May 11 2023  9:07PM    < end of copied text >    Collected Date/Time: 2023 16:52 EDT   Received Date/Time: 2023 08:54 EDT   Surgical Pathology Report - Auth (Verified)   Specimen(s) Submitted   1 Endometrial biopsy   2 Portion of cervix   Final Diagnosis   1. Endometrial biopsy:   -Superficial fragments of atypical villoglandular epithelium in background   of mainly blood cell elements.   2. Portion of cervix, biopsy:   -Adenocarcinoma with villoglandular pattern . See note below   Note: Immunohistochemical stains reveals the carcinoma is positive for   CK7 and vimentin, but negative for estrogen receptor, CEA and p16. The   immunophenotype favor endometrioid adenocarcinoma. The carcinoma is also   weakly positive for p53, but negative for estrogen receptor, p63, p40,   and CK20.   -------------------------------------------------   Immunohistochemistry Testing for Mismatch Repair (MMR) Proteins   Immunohistochemical stains have been performed using antibodies to the   following mismatch repair proteins:   hMLH1 (clone [id number]) Intact nuclear expression   hMSH2 (clone [id number]) Intact nuclear expression   hMSH6 (clone 44) Intact nuclear expression   PMS2 (clone [id number]) Intact nuclear expression   Interpretation: No loss of nuclear expression of MMR proteins (MLH1, MSH2,   MSH6, and PMS2). These results show low probability of microsatellite   instability-high (MSI-H).   There is no evidence of DNA mismatch repair deficiency in the analyzed   tissue, indicating there is a low probability for Bronson syndrome   (a common cause hereditary non polyposis colorectal cancer or   HNPCC). However, intactexpression of all 4 proteins indicates that   MMR enzyme tested are intact but does not entirely exclude Bronson   syndrome, as approximately 5% of families may have a missense mutation   (especially MLH1) that can lead to a nonfunctional protein with retained   antigenicity.   Comment: Background non-neoplastic tissue/internal control shows intact   nuclear expression.   Note: All controls show appropriate reactivity.   These immunohistochemical tests have been developed and their performance   characteristics determined by [facility], [address] [place] [address]. It has not been   cleared or approved by the U.S. Food and Drug administration. The FDA   has determined that such clearance or approval is not necessary. This   test is used for clinical purposes.   The laboratory is certified under the CLIA-88 as qualified to perform high   complexity clinical testing. These assays have not been validated on   decalcified tissues. Results should be interpreted with caution given   the likelihood of false negativity on decalcified specimen.   Verified by: Dougie Meneses M.D.   (Electronic Signature)   Reported on: 23 17:14 EDT, [place] [address]   Phone: [phone] Fax: [phone]  Chief Complaint: postmenopausal bleeding    HPI: 78yo P2 postmenopausal PMHx of HTN, Aortic stenosis, atrial tachycardia, chronic venous stasis and OA presenting to ED for  vaginal bleeding. Patient known to Gyn team from inpatient consult on 2023 for vaginal bleeding.  Cervical biopsy remarkable for endometrioid adenocarcinoma. Patient reports that vaginal bleeding has continued since that time, denies worsening of vaginal bleeding.  States her daughter advised rehab facility to bring her to ED for evaluation. Per patient, bleeding is noted when wiping, not passing large clots or having large gushes of blood.  Reports some weight loss over the past year.  Denies abdominal pain, constipation, diarrhea, difficulty urinating, denies night sweats, dysuria. Denies fevers, chills. She has not presented to a GYN in the last 10 years. Patient has scheduled appointment with Dr. Mcdaniels, GynOnc, on May 16, 2023.     Ob/Gyn History:  ObHx: P2, H/O 2 FT     GynHx: Denies history of ovarian cysts, uterine fibroids, abnormal paps, or STIs  Last Pap Smear - 10 years ago  Last Mammogram - 5 years ago  Last Colonoscopy - 5 years ago    Denies the following: constitutional symptoms, visual symptoms, cardiovascular symptoms, respiratory symptoms, GI symptoms, musculoskeletal symptoms, skin symptoms, neurologic symptoms, hematologic symptoms, allergic symptoms, psychiatric symptoms  Except any pertinent positives listed.     Home Medications:  furosemide 20 mg oral tablet: 1 orally once a day (2023 00:52)  metOLazone 2.5 mg oral tablet: 1 orally 2 times a week (2023 00:52)    Allergy Status Unknown    PAST MEDICAL & SURGICAL HISTORY:  H/O atrial tachycardia  Osteoarthritis  Hypertension  Aortic stenosis  No significant past surgical history    FAMILY HISTORY:  No pertinent family history in first degree relatives    SOCIAL HISTORY: Denies cigarette use, alcohol use, or illicit drug use      Vital Signs Last 24 Hrs  T(F): 98.5 (11 May 2023 16:16), Max: 98.5 (11 May 2023 16:16)  HR: 85 (11 May 2023 16:16) (85 - 85)  BP: 101/57 (11 May 2023 16:16) (101/57 - 101/57)  RR: 18 (11 May 2023 16:16) (18 - 18)  Height (cm): 154.9 (23 @ 16:16)  Weight (kg): 68 (23 @ 16:16)  BMI (kg/m2): 28.3 (23 @ 16:16)  BSA (m2): 1.67 (23 @ 16:16)    UO:     General Appearance - AAOx3, NAD  Heart - S1S2 regular rate and rhythm  Lung - CTA Bilaterally  Abdomen - Soft, nontender, nondistended, no rebound, no rigidity, no guarding, bowel sounds present    GYN/Pelvis:  External Genitalia: normal external female genitalia  Vaginal: normal vaginal mucosa, 5cc thin red blood in vagina, no active extravasation on valsalva, no clots.   Cervix: unable to visualize, irregular contour, friable   Uterus: unable to palpate on bimanual exam due to body habitus   Adnexa: nontender, limited palpable exam due to body habitus    Meds:     Height (cm): 154.9 (23 @ 16:16)  Weight (kg): 68 (23 @ 16:16)  BMI (kg/m2): 28.3 (23 @ 16:16)  BSA (m2): 1.67 (23 @ 16:16)    LABS:                        9.8    5.42  )-----------( 521      ( 11 May 2023 18:42 )             32.0             136  |  98  |  23<H>  ----------------------------<  103<H>  5.6<H>   |  26  |  1.0    Ca    8.8      11 May 2023 18:42    TPro  7.2  /  Alb  3.1<L>  /  TBili  0.4  /  DBili  x   /  AST  36  /  ALT  20  /  AlkPhos  235<H>      PT/INR - ( 11 May 2023 18:42 )   PT: 12.60 sec;   INR: 1.10 ratio         PTT - ( 11 May 2023 18:42 )  PTT:33.2 sec        RADIOLOGY & ADDITIONAL STUDIES:    < from: US Transvaginal (23 @ 19:52) >  ACC: [id number] EXAM:  US TRANSVAGINAL   ORDERED BY: BESA BARDHI     PROCEDURE DATE:  2023          INTERPRETATION:  CLINICAL INFORMATION: Vaginal bleeding. Recent diagnosis   of endometrial cancer.    LMP: Postmenopausal.    COMPARISON: 2023.    TECHNIQUE:  Endovaginal pelvic sonogram only. Please note, per technologist   examination is markedly limited as patient was unable to cooperate with   exam.    FINDINGS:  Uterus: 9.3 x 3.8 x 5.2. Endometrium is thickened, measuring   approximately 1.6 cm, with a focal area of increased flow, measuring 1.5   x 1.3 x 1.5 cm, suspicious for known endometrial neoplasm with associated   blood clot.    Right ovary: Not visualized.  Left ovary: Not visualized.      IMPRESSION:    1. Limitedexam demonstrating a thickened endometrium, measuring   approximately 1.6 cm, with a focal area of increased flow, suspicious for   known endometrial neoplasm with associated blood clot.        --- End of Report ---            CHANDA LOCKHART MD; Attending Radiologist  This document has been electronically signed. May 11 2023  9:07PM    < end of copied text >    Collected Date/Time: 2023 16:52 EDT   Received Date/Time: 2023 08:54 EDT   Surgical Pathology Report - Auth (Verified)   Specimen(s) Submitted   1 Endometrial biopsy   2 Portion of cervix   Final Diagnosis   1. Endometrial biopsy:   -Superficial fragments of atypical villoglandular epithelium in background   of mainly blood cell elements.   2. Portion of cervix, biopsy:   -Adenocarcinoma with villoglandular pattern . See note below   Note: Immunohistochemical stains reveals the carcinoma is positive for   CK7 and vimentin, but negative for estrogen receptor, CEA and p16. The   immunophenotype favor endometrioid adenocarcinoma. The carcinoma is also   weakly positive for p53, but negative for estrogen receptor, p63, p40,   and CK20.   -------------------------------------------------   Immunohistochemistry Testing for Mismatch Repair (MMR) Proteins   Immunohistochemical stains have been performed using antibodies to the   following mismatch repair proteins:   hMLH1 (clone [id number]) Intact nuclear expression   hMSH2 (clone [id number]) Intact nuclear expression   hMSH6 (clone 44) Intact nuclear expression   PMS2 (clone [id number]) Intact nuclear expression   Interpretation: No loss of nuclear expression of MMR proteins (MLH1, MSH2,   MSH6, and PMS2). These results show low probability of microsatellite   instability-high (MSI-H).   There is no evidence of DNA mismatch repair deficiency in the analyzed   tissue, indicating there is a low probability for Bronson syndrome   (a common cause hereditary non polyposis colorectal cancer or   HNPCC). However, intactexpression of all 4 proteins indicates that   MMR enzyme tested are intact but does not entirely exclude Bronson   syndrome, as approximately 5% of families may have a missense mutation   (especially MLH1) that can lead to a nonfunctional protein with retained   antigenicity.   Comment: Background non-neoplastic tissue/internal control shows intact   nuclear expression.   Note: All controls show appropriate reactivity.   These immunohistochemical tests have been developed and their performance   characteristics determined by [facility], [address] [place] [address]. It has not been   cleared or approved by the U.S. Food and Drug administration. The FDA   has determined that such clearance or approval is not necessary. This   test is used for clinical purposes.   The laboratory is certified under the CLIA-88 as qualified to perform high   complexity clinical testing. These assays have not been validated on   decalcified tissues. Results should be interpreted with caution given   the likelihood of false negativity on decalcified specimen.   Verified by: Dougie Meneses M.D.   (Electronic Signature)   Reported on: 23 17:14 EDT, [place] [address]   Phone: [phone] Fax: [phone]

## 2023-05-14 PROBLEM — Z00.00 ENCOUNTER FOR PREVENTIVE HEALTH EXAMINATION: Status: ACTIVE | Noted: 2023-05-01

## 2023-05-14 NOTE — ASSESSMENT
[FreeTextEntry1] : Ms. JESÚS TERRELL 77 year F,   Arrives  today for evaluation of their aortic stenosis.Symptoms include SOB. All questions and concerns were addressed with the patient. Pre-op planning was discussed with the patient, including dental clearance. Patient was educated on the risks and alternatives to surgery. STS was calculated and discussed with the pt, as well of need for MCOT post procedure and risk for PPM. \par \par Pt unable to ambulate today, arrives in stretcher from nursing home\par States she walks in NH with walker during PT\par LE chronic lymphedema \par Needs labs - ordered \par Plan for BAV and cath \par F/U Structural heart after   \par \par F/U Cardio for continued management\par F/U Vasc for b/l le chronic venous stasis/lymphedema \par F/U GI for colonoscopy for iron deficiency anemia and rectosigmoid thickening on CT  \par F/U GYN for vaginal bleeding s/p biopsy - f/u GYN for results

## 2023-05-14 NOTE — END OF VISIT
[FreeTextEntry3] : \par AS\par Chronic Diastolic CHF\par \par Multiple comorbidities as above.\par \par Arrives from NH on stretcher, but states she participated in PT / uses walker at baseline.\par Exertional dyspnea.\par \par ECHO: Hyperdynamic LVSF.  Moderate LVH.  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  Severe pulm HTN.\par \par Unclear prognosis.  Need medical optimization of subaortic stenosis and further evaluation of pulm HTN prior to considering TAVR.  Temporizing BAV may be an option.\par \par Plan for RHC / LHC.

## 2023-05-14 NOTE — DISCUSSION/SUMMARY
[FreeTextEntry1] : - CT angio Abdomen and pelvis TAVR w and CT angio Chest TAVR -->\par < from: CT Angio Abdomen and Pelvis TAVR NAYELI w/wo IV Cont (04.25.23 @ 10:07) >\par 1.  Annulus area is 419 mm2, bi-plane diameter of elliptical cross\par section 26 x 21 mm and annulus perimeter is 76 mm.\par 2. The smallest vessel diameter in the pelvis is 9 mm on the right and 8\par mm on the left.\par 3. The smallest vessel diameter in the axilla is 6 mm on the right and 6\par mm on the left.\par 4. Gallbladder air.. Pneumobilia. Correlate for prior instrumentation\par 5. Multiple subcentimeter and enlarged retroperitoneal and bilateral\par iliac/inguinal lymph nodes. Findings suspicious for neoplastic process.\par Further evaluation recommended.\par 6. Nonspecific wall thickening of the rectosigmoid.\par 7. 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 Dilated main pulmonary artery.\par 9. Enlarged and heterogeneous thyroid gland with multiple hypodense nodules\par 10. Pancreatic cysts. Recommend MR abdomen with and without contrast for\par complete characterization.\par - Carotid duplex: 20-39% stenosis in R internal carotid and 40-59% stenosis in\par left internal carotid\par \par - CT abd + pelvis with PO & IV contrast:\par 1.  Gallbladder air and trace pneumobilia is again noted, of uncertain\par etiology. No cholecystoenteric fistula is identified.\par 2.  There is a questionable nodular liver contour, raising the possibility of\par early cirrhosis.\par 3.  There is blas-rectosigmoid inflammatory change suggesting proctocolitis.\par 4.  Anasarca is noted.\par - RUQ ultrasound -->gallbladder not visible

## 2023-05-14 NOTE — HISTORY OF PRESENT ILLNESS
[FreeTextEntry1] : Ms. JESÚS TERRELL 77 year F, never smoker, with a PMHx of HTN, Aortic stenosis, atrial tachycardia, chronic venous stasis, HTN, NSTEMI, pulmonary HTN, SVT, arthritis, BARON, breast lumpectomy, CHF.  Arrives  today for evaluation of their aortic stenosis.Symptoms include SOB. All questions and concerns were addressed with the patient. Pre-op planning was discussed with the patient, including dental clearance. Patient was educated on the risks and alternatives to surgery. STS was calculated and discussed with the pt, as well of need for MCOT post procedure and risk for PPM. \par \par NYHA class II\par Pt lives home no aid - however currently in SNF for STR\par Presents in stretcher - unable to ambulate today  \par \par Their healthcare team includes the following\par PMD: Epifanio \par Cardio: Murlerstein? pt states he is on SI and she sees him regularly \par \par 5 Meter walk: unable to walk\par \par 6 minute walk: - unable to walk \par \par Frailty: \par Right 13.2, 14.2, 13.7\par Left 12.5, 12.7, 12. 4\par \par \par

## 2023-05-16 ENCOUNTER — APPOINTMENT (OUTPATIENT)
Dept: GYNECOLOGIC ONCOLOGY | Facility: CLINIC | Age: 78
End: 2023-05-16
Payer: MEDICARE

## 2023-05-16 ENCOUNTER — OUTPATIENT (OUTPATIENT)
Dept: OUTPATIENT SERVICES | Facility: HOSPITAL | Age: 78
LOS: 1 days | End: 2023-05-16
Payer: MEDICARE

## 2023-05-16 VITALS
DIASTOLIC BLOOD PRESSURE: 68 MMHG | HEIGHT: 66 IN | RESPIRATION RATE: 14 BRPM | TEMPERATURE: 98.1 F | SYSTOLIC BLOOD PRESSURE: 102 MMHG | HEART RATE: 90 BPM

## 2023-05-16 DIAGNOSIS — C54.1 MALIGNANT NEOPLASM OF ENDOMETRIUM: ICD-10-CM

## 2023-05-16 PROCEDURE — 99204 OFFICE O/P NEW MOD 45 MIN: CPT

## 2023-05-16 PROCEDURE — 99215 OFFICE O/P EST HI 40 MIN: CPT

## 2023-05-16 NOTE — HISTORY OF PRESENT ILLNESS
[FreeTextEntry1] : 76 y/o  presents with 1-2 years of PMB. Recently seen in the hospital and had EMBx done showed endometrial cancer\par \par She is here to determine treatment options.\par \par She has multiple co morbidities including severe AS, venous stasis and HTN, prior localized breast ca many years prior (aprox 30)\par PSH no abdominal surgery\par FHx M with breast ca, Father with lung/prostate ca, B with H&N ca

## 2023-05-16 NOTE — ASSESSMENT
[FreeTextEntry1] : \par \par Collected Date/Time:                   4/26/2023 16:52 EDT\par Received Date/Time:                    4/27/2023 08:54 EDT\par \par Surgical Pathology Report - Auth (Verified)\par \par Specimen(s) Submitted\par 1  Endometrial biopsy\par 2  Portion of cervix\par \par Final Diagnosis\par 1.  Endometrial biopsy:\par -Superficial fragments of atypical villoglandular epithelium in background\par of mainly blood cell elements.\par \par \par 2.  Portion of cervix, biopsy:\par -Adenocarcinoma with villoglandular pattern .  See note below\par \par Note: Immunohistochemical stains reveals the carcinoma is positive for\par CK7 and vimentin,  but negative for estrogen receptor, CEA and p16.  The\par immunophenotype favor endometrioid adenocarcinoma.  The carcinoma is also\par weakly positive for p53, but negative for estrogen receptor, p63, p40,\par and CK20.\par -------------------------------------------------\par Immunohistochemistry Testing for Mismatch Repair (MMR) Proteins\par \par Immunohistochemical stains have been performed using antibodies to the\par following mismatch repair proteins:\par \par \par hMLH1 (clone [id number])   Intact nuclear expression\par \par hMSH2 (clone [id number])    Intact nuclear expression\par \par hMSH6 (clone [id number])   Intact nuclear expression\par \par PMS2 (clone [id number])    Intact nuclear expression\par \par Interpretation: No loss of nuclear expression of MMR proteins (MLH1, MSH2,\par MSH6, and PMS2).  These results show low probability of microsatellite\par instability-high (MSI-H).\par \par There is no evidence of DNA mismatch repair deficiency in the analyzed\par tissue, indicating there is a low probability for Bronson syndrome\par (a common cause hereditary non polyposis colorectal cancer or\par HNPCC). However, intact expression of all 4 proteins indicates that\par MMR enzyme tested are intact but does not entirely exclude Bronson\par syndrome, as approximately 5% of families may have a missense mutation\par (especially MLH1) that can lead to a nonfunctional protein with retained\par antigenicity.\par Comment: Background non-neoplastic tissue/internal control shows intact\par nuclear expression.\par \par Note:  All controls show appropriate reactivity.\par \par These immunohistochemical tests have been developed and their performance\par characteristics determined by [facility]\par division,  [address] [place] [address]. It has not been\par cleared or approved by the U.S. Food and Drug administration.  The FDA\par has determined that such clearance or approval is not necessary.  This\par test is used for clinical purposes.\par \par The laboratory is certified under the CLIA-88 as qualified to perform high\par complexity clinical testing.  These assays have not been validated on\par decalcified tissues.  Results should be interpreted with caution given\par the likelihood of false negativity on decalcified specimen.\par \par Verified by: Dougie Meneses M.D.\par (Electronic Signature)\par Reported on: 05/03/23 17:14 EDT, [facility],\par [address] [place] [address]\par Phone: [phone]   Fax: [phone]\par _________________________________________________________________\par \par Clinical Information\par Endometrial biopsy\par 70 year old postmenopausal bleeding, R/O endometrial CA vs cervical cancer\par \par \par Gross Description\par 1.  Received in formalin labeled "endometrial biopsy".  The specimen\par consists of multiple fragments of soft tan-gray tissue and blood clots\par measuring 2.7 x 1.1 x 0.3 cm in aggregate.  The specimen is filtered and\par entirely submitted.  (1 block)\par \par 2.  Received in formalin labeled "cervical/endometrial tissue".  The\par specimen consists of multiple fragments of soft tan tissue measuring 2.3\par x 2.1 x 0.3 cm in aggregate.  The specimen is entirely submitted.  (1\par block)\par \par Specimen was received and underwent gross examination at [place]\[facility], [address] [place] [id number].\par \par 04/27/2023 10:35:56 EDT   LB\par \par CT\par FINDINGS:\par \par Exam slightly degraded by patient arm positioning at their side.\par \par LOWER CHEST: Cardiomegaly.\par \par HEPATOBILIARY: Question micronodular liver contour. There is a subcentimeter calcification of the liver dome. No radiopaque gallstones. Gallbladder and trace pneumobilia again noted, without identified etiology. No biliary ductal dilatation.\par \par SPLEEN: Unremarkable.\par \par PANCREAS: Previously described pancreatic cysts are poorly visualized on this exam.\par \par ADRENAL GLANDS: Unremarkable.\par \par KIDNEYS: Symmetric parenchymal enhancement. No hydronephrosis or perinephric stranding. A few left renal cysts measuring up to 7 cm..\par \par ABDOMINOPELVIC NODES: Prominent retroperitoneal, bilateral iliac, and perirectal lymph nodes again noted.\par \par PELVIC ORGANS: Unremarkable.\par \par PERITONEUM/MESENTERY/BOWEL: There is blas-rectosigmoid fat stranding and mild wall thickening. There is no evidence of bowel obstruction. There is no free air or fluid.\par \par VASCULATURE: There is no abdominal aortic aneurysm.\par \par BONES/SOFT TISSUES: There is anasarca. No acute osseous abnormality is seen. Degenerative change and grade 1 anterolisthesis is noted at L5-S1. There is thoracolumbar levoscoliosis and transitional lumbosacral anatomy.\par \par \par IMPRESSION:\par 1. Gallbladder air and trace pneumobilia is again noted, of uncertain etiology. No cholecystoenteric fistula is identified.\par 2. There is a questionable nodular liver contour, raising the possibility of early cirrhosis.\par 3. There is blas-rectosigmoid inflammatory change suggesting proctocolitis.\par 4. Anasarca is noted.\par \par --- End of Report ---

## 2023-05-16 NOTE — PHYSICAL EXAM
[Absent] : CVAT: absent [Normal] : Recto-Vaginal Exam: Normal [FreeTextEntry1] : NAD but appears cachectic [de-identified] : TEJINDER [de-identified] : B edema [de-identified] : soft NT/ND [de-identified] : 2x3 [de-identified] : 8 weeks [place] [de-identified] : no masses [Capable of only limited self care, confined to bed or chair more than 50% of waking hours] : Status 3- Capable of only limited self care, confined to bed or chair more than 50% of waking hours

## 2023-05-16 NOTE — DISCUSSION/SUMMARY
[Visit Time ___ Minutes] : [unfilled] minutes [Face to Face Time___ Minutes] : with [unfilled] minutes in face to face consultation. [FreeTextEntry1] : Clinically stage I endometrial cancer\par Ordinarily endometrial cancer requires surgery both for therapeutic as well as diagnostic reasons. \par \par The patient however has severe untreated AS and pHTN which are ordinarily contraindications for surgery. The alternative is RT which is less likely to be curative, but will control the bleeding and provide some disease control.\par \par Will discuss with cardiology team as to the treatment options  (possible BAV) prior to making a final recommendation.\par \par Patient and the family are in agreement with this, will call asap.

## 2023-05-16 NOTE — REVIEW OF SYSTEMS
[Negative] : Musculoskeletal [Rash] : no rash noted [Ulcer] : an ulcer was seen [Syncope] : no syncope noted [Neuropathy] : no neuropathy [Depression] : no depression [Diabetes] : no diabetes mellitus [Hematuria] : no hematuria [Abn Vag Bleeding] : abnormal vaginal bleeding [Normal Sexual Function] : abnormal sexual function [Fatigue] : no fatigue [Recent Wt Gain ___ Lbs] : no recent weight gain [Recent Wt Loss___ Lbs] : recent weight loss of [unfilled] lbs [Chest Pain] : no chest pain [Orthopnea] : orthopnea [VO] : dyspnea on exertion [Wheezing] : no wheezing [SOB on Exertion] : shortness of breath during exertion [Bloody Stools] : no bloody stools [Muscle Weakness] : muscle weakness [FreeTextEntry1] : B venous stasis ulcers

## 2023-05-17 ENCOUNTER — TRANSCRIPTION ENCOUNTER (OUTPATIENT)
Age: 78
End: 2023-05-17

## 2023-05-17 DIAGNOSIS — I35.0 NONRHEUMATIC AORTIC (VALVE) STENOSIS: ICD-10-CM

## 2023-05-17 DIAGNOSIS — C55 MALIGNANT NEOPLASM OF UTERUS, PART UNSPECIFIED: ICD-10-CM

## 2023-05-19 ENCOUNTER — NON-APPOINTMENT (OUTPATIENT)
Age: 78
End: 2023-05-19

## 2023-05-25 ENCOUNTER — TRANSCRIPTION ENCOUNTER (OUTPATIENT)
Age: 78
End: 2023-05-25

## 2023-05-30 ENCOUNTER — RESULT REVIEW (OUTPATIENT)
Age: 78
End: 2023-05-30

## 2023-05-30 ENCOUNTER — OUTPATIENT (OUTPATIENT)
Dept: OUTPATIENT SERVICES | Facility: HOSPITAL | Age: 78
LOS: 1 days | End: 2023-05-30
Payer: MEDICARE

## 2023-05-30 VITALS
OXYGEN SATURATION: 100 % | DIASTOLIC BLOOD PRESSURE: 56 MMHG | WEIGHT: 154.98 LBS | RESPIRATION RATE: 18 BRPM | SYSTOLIC BLOOD PRESSURE: 99 MMHG | TEMPERATURE: 98 F | HEIGHT: 61 IN | HEART RATE: 68 BPM

## 2023-05-30 DIAGNOSIS — I35.0 NONRHEUMATIC AORTIC (VALVE) STENOSIS: ICD-10-CM

## 2023-05-30 DIAGNOSIS — Z98.890 OTHER SPECIFIED POSTPROCEDURAL STATES: Chronic | ICD-10-CM

## 2023-05-30 DIAGNOSIS — Z01.818 ENCOUNTER FOR OTHER PREPROCEDURAL EXAMINATION: ICD-10-CM

## 2023-05-30 LAB
A1C WITH ESTIMATED AVERAGE GLUCOSE RESULT: 5.2 % — SIGNIFICANT CHANGE UP (ref 4–5.6)
ALBUMIN SERPL ELPH-MCNC: 3.3 G/DL — LOW (ref 3.5–5.2)
ALP SERPL-CCNC: 265 U/L — HIGH (ref 30–115)
ALT FLD-CCNC: 30 U/L — SIGNIFICANT CHANGE UP (ref 0–41)
ANION GAP SERPL CALC-SCNC: 12 MMOL/L — SIGNIFICANT CHANGE UP (ref 7–14)
APTT BLD: 30.3 SEC — SIGNIFICANT CHANGE UP (ref 27–39.2)
AST SERPL-CCNC: 42 U/L — HIGH (ref 0–41)
BASOPHILS # BLD AUTO: 0.05 K/UL — SIGNIFICANT CHANGE UP (ref 0–0.2)
BASOPHILS NFR BLD AUTO: 0.7 % — SIGNIFICANT CHANGE UP (ref 0–1)
BILIRUB SERPL-MCNC: 0.5 MG/DL — SIGNIFICANT CHANGE UP (ref 0.2–1.2)
BLD GP AB SCN SERPL QL: SIGNIFICANT CHANGE UP
BUN SERPL-MCNC: 23 MG/DL — HIGH (ref 10–20)
CALCIUM SERPL-MCNC: 8.5 MG/DL — SIGNIFICANT CHANGE UP (ref 8.4–10.5)
CHLORIDE SERPL-SCNC: 97 MMOL/L — LOW (ref 98–110)
CO2 SERPL-SCNC: 30 MMOL/L — SIGNIFICANT CHANGE UP (ref 17–32)
CREAT SERPL-MCNC: 0.9 MG/DL — SIGNIFICANT CHANGE UP (ref 0.7–1.5)
EGFR: 65 ML/MIN/1.73M2 — SIGNIFICANT CHANGE UP
EOSINOPHIL # BLD AUTO: 0.68 K/UL — SIGNIFICANT CHANGE UP (ref 0–0.7)
EOSINOPHIL NFR BLD AUTO: 9.4 % — HIGH (ref 0–8)
ESTIMATED AVERAGE GLUCOSE: 103 MG/DL — SIGNIFICANT CHANGE UP (ref 68–114)
GLUCOSE SERPL-MCNC: 88 MG/DL — SIGNIFICANT CHANGE UP (ref 70–99)
HCT VFR BLD CALC: 34.4 % — LOW (ref 37–47)
HGB BLD-MCNC: 10.4 G/DL — LOW (ref 12–16)
IMM GRANULOCYTES NFR BLD AUTO: 0.7 % — HIGH (ref 0.1–0.3)
INR BLD: 1.12 RATIO — SIGNIFICANT CHANGE UP (ref 0.65–1.3)
LYMPHOCYTES # BLD AUTO: 1.14 K/UL — LOW (ref 1.2–3.4)
LYMPHOCYTES # BLD AUTO: 15.7 % — LOW (ref 20.5–51.1)
MCHC RBC-ENTMCNC: 24.8 PG — LOW (ref 27–31)
MCHC RBC-ENTMCNC: 30.2 G/DL — LOW (ref 32–37)
MCV RBC AUTO: 81.9 FL — SIGNIFICANT CHANGE UP (ref 81–99)
MONOCYTES # BLD AUTO: 0.52 K/UL — SIGNIFICANT CHANGE UP (ref 0.1–0.6)
MONOCYTES NFR BLD AUTO: 7.2 % — SIGNIFICANT CHANGE UP (ref 1.7–9.3)
MRSA PCR RESULT.: NEGATIVE — SIGNIFICANT CHANGE UP
NEUTROPHILS # BLD AUTO: 4.82 K/UL — SIGNIFICANT CHANGE UP (ref 1.4–6.5)
NEUTROPHILS NFR BLD AUTO: 66.3 % — SIGNIFICANT CHANGE UP (ref 42.2–75.2)
NRBC # BLD: 0 /100 WBCS — SIGNIFICANT CHANGE UP (ref 0–0)
NT-PROBNP SERPL-SCNC: HIGH PG/ML (ref 0–300)
PLATELET # BLD AUTO: 471 K/UL — HIGH (ref 130–400)
PMV BLD: 9.9 FL — SIGNIFICANT CHANGE UP (ref 7.4–10.4)
POTASSIUM SERPL-MCNC: 4.2 MMOL/L — SIGNIFICANT CHANGE UP (ref 3.5–5)
POTASSIUM SERPL-SCNC: 4.2 MMOL/L — SIGNIFICANT CHANGE UP (ref 3.5–5)
PROT SERPL-MCNC: 7 G/DL — SIGNIFICANT CHANGE UP (ref 6–8)
PROTHROM AB SERPL-ACNC: 12.8 SEC — SIGNIFICANT CHANGE UP (ref 9.95–12.87)
RBC # BLD: 4.2 M/UL — SIGNIFICANT CHANGE UP (ref 4.2–5.4)
RBC # FLD: 20.8 % — HIGH (ref 11.5–14.5)
SODIUM SERPL-SCNC: 139 MMOL/L — SIGNIFICANT CHANGE UP (ref 135–146)
WBC # BLD: 7.26 K/UL — SIGNIFICANT CHANGE UP (ref 4.8–10.8)
WBC # FLD AUTO: 7.26 K/UL — SIGNIFICANT CHANGE UP (ref 4.8–10.8)

## 2023-05-30 PROCEDURE — 87641 MR-STAPH DNA AMP PROBE: CPT

## 2023-05-30 PROCEDURE — 86850 RBC ANTIBODY SCREEN: CPT

## 2023-05-30 PROCEDURE — 86900 BLOOD TYPING SEROLOGIC ABO: CPT

## 2023-05-30 PROCEDURE — 85025 COMPLETE CBC W/AUTO DIFF WBC: CPT

## 2023-05-30 PROCEDURE — 71046 X-RAY EXAM CHEST 2 VIEWS: CPT

## 2023-05-30 PROCEDURE — 93005 ELECTROCARDIOGRAM TRACING: CPT

## 2023-05-30 PROCEDURE — 71046 X-RAY EXAM CHEST 2 VIEWS: CPT | Mod: 26

## 2023-05-30 PROCEDURE — 83880 ASSAY OF NATRIURETIC PEPTIDE: CPT

## 2023-05-30 PROCEDURE — 85730 THROMBOPLASTIN TIME PARTIAL: CPT

## 2023-05-30 PROCEDURE — 80053 COMPREHEN METABOLIC PANEL: CPT

## 2023-05-30 PROCEDURE — 36415 COLL VENOUS BLD VENIPUNCTURE: CPT

## 2023-05-30 PROCEDURE — 83036 HEMOGLOBIN GLYCOSYLATED A1C: CPT

## 2023-05-30 PROCEDURE — 87640 STAPH A DNA AMP PROBE: CPT

## 2023-05-30 PROCEDURE — 99214 OFFICE O/P EST MOD 30 MIN: CPT | Mod: 25

## 2023-05-30 PROCEDURE — 85610 PROTHROMBIN TIME: CPT

## 2023-05-30 PROCEDURE — 86901 BLOOD TYPING SEROLOGIC RH(D): CPT

## 2023-05-30 PROCEDURE — 93010 ELECTROCARDIOGRAM REPORT: CPT

## 2023-05-30 RX ORDER — SALICYLIC ACID 0.5 %
1 CLEANSER (GRAM) TOPICAL
Refills: 0 | DISCHARGE

## 2023-05-30 RX ORDER — POLYETHYLENE GLYCOL 3350 17 G/17G
17 POWDER, FOR SOLUTION ORAL
Refills: 0 | DISCHARGE

## 2023-05-30 NOTE — H&P PST ADULT - NSICDXPASTMEDICALHX_GEN_ALL_CORE_FT
PAST MEDICAL HISTORY:  Aortic stenosis     Diastolic CHF     H/O atrial tachycardia     Hypertension     Osteoarthritis     Pulmonary HTN     Uterine cervix cancer

## 2023-05-30 NOTE — H&P PST ADULT - LIVES WITH, PROFILE
alone Skin Substitute Injection Text: The defect edges were debeveled with a #15 scalpel blade.  Given the location of the defect, shape of the defect and the proximity to free margins a skin substitute micronized graft was deemed most appropriate.  The entire vial contents were admixed with 3.0ccs of sterile saline and then injected subcutaneously throughout the entire wound bed.

## 2023-05-30 NOTE — H&P PST ADULT - HISTORY OF PRESENT ILLNESS
Patient is a 77 year old female presenting to PAST in preparation for  balloon Aortic valvuloplasty on 6/6  under sedation anesthesia by Dr. Will  Pt with h/o uterine cancer which will require intervention However  she has aortic stenosis , will have BAV to relieve the pressure in her heart  Pt is a poor historian info obtained from MD note She resided in a NH and was not accompanied by staff.  PATIENT CURRENTLY DENIES CHEST PAIN  SHORTNESS OF BREATH  PALPITATIONS,  DYSURIA, OR UPPER RESPIRATORY INFECTION IN PAST 2 WEEKS  EXERCISE  TOLERANCE  1/2 FLIGHT OF STAIRS  WITHOUT SHORTNESS OF BREATH  She presented to PAST in a wheelchair but reports she is able to walk short distances without sob    Anesthesia Alert  NO--Difficult Airway  NO--History of neck surgery or radiation  NO--Limited ROM of neck  NO--History of Malignant hyperthermia  NO--Personal or family history of Pseudocholinesterase deficiency  NO--Prior Anesthesia Complication  NO--Latex Allergy  NO--Loose teeth  NO--History of Rheumatoid Arthritis  NO--CHARLES  NO-- BLEEDING RISK  NO--Other_____    As per patient, this is their complete medical and surgical history, including medications both prescribed or over the counter.  Patient verbalized understanding of instructions and was given the opportunity to ask questions and have them answered.

## 2023-05-31 DIAGNOSIS — I35.0 NONRHEUMATIC AORTIC (VALVE) STENOSIS: ICD-10-CM

## 2023-05-31 DIAGNOSIS — Z01.818 ENCOUNTER FOR OTHER PREPROCEDURAL EXAMINATION: ICD-10-CM

## 2023-06-07 ENCOUNTER — INPATIENT (INPATIENT)
Facility: HOSPITAL | Age: 78
LOS: 0 days | Discharge: NURSING, REHAB OR LONG-TERM CARE | DRG: 320 | End: 2023-06-08
Attending: INTERNAL MEDICINE | Admitting: INTERNAL MEDICINE
Payer: MEDICARE

## 2023-06-07 VITALS — HEIGHT: 60.63 IN | WEIGHT: 171.52 LBS

## 2023-06-07 DIAGNOSIS — I35.0 NONRHEUMATIC AORTIC (VALVE) STENOSIS: ICD-10-CM

## 2023-06-07 DIAGNOSIS — Z98.890 OTHER SPECIFIED POSTPROCEDURAL STATES: Chronic | ICD-10-CM

## 2023-06-07 LAB
ANION GAP SERPL CALC-SCNC: 9 MMOL/L — SIGNIFICANT CHANGE UP (ref 7–14)
BLD GP AB SCN SERPL QL: SIGNIFICANT CHANGE UP
BUN SERPL-MCNC: 18 MG/DL — SIGNIFICANT CHANGE UP (ref 10–20)
CALCIUM SERPL-MCNC: 8.6 MG/DL — SIGNIFICANT CHANGE UP (ref 8.4–10.5)
CHLORIDE SERPL-SCNC: 97 MMOL/L — LOW (ref 98–110)
CO2 SERPL-SCNC: 31 MMOL/L — SIGNIFICANT CHANGE UP (ref 17–32)
CREAT SERPL-MCNC: 0.8 MG/DL — SIGNIFICANT CHANGE UP (ref 0.7–1.5)
EGFR: 75 ML/MIN/1.73M2 — SIGNIFICANT CHANGE UP
GLUCOSE SERPL-MCNC: 82 MG/DL — SIGNIFICANT CHANGE UP (ref 70–99)
HCT VFR BLD CALC: 33.9 % — LOW (ref 37–47)
HGB BLD-MCNC: 10.5 G/DL — LOW (ref 12–16)
MCHC RBC-ENTMCNC: 25.7 PG — LOW (ref 27–31)
MCHC RBC-ENTMCNC: 31 G/DL — LOW (ref 32–37)
MCV RBC AUTO: 82.9 FL — SIGNIFICANT CHANGE UP (ref 81–99)
NRBC # BLD: 0 /100 WBCS — SIGNIFICANT CHANGE UP (ref 0–0)
PLATELET # BLD AUTO: 431 K/UL — HIGH (ref 130–400)
PMV BLD: 9.2 FL — SIGNIFICANT CHANGE UP (ref 7.4–10.4)
POTASSIUM SERPL-MCNC: 3.6 MMOL/L — SIGNIFICANT CHANGE UP (ref 3.5–5)
POTASSIUM SERPL-SCNC: 3.6 MMOL/L — SIGNIFICANT CHANGE UP (ref 3.5–5)
RBC # BLD: 4.09 M/UL — LOW (ref 4.2–5.4)
RBC # FLD: 20.5 % — HIGH (ref 11.5–14.5)
SODIUM SERPL-SCNC: 137 MMOL/L — SIGNIFICANT CHANGE UP (ref 135–146)
WBC # BLD: 6.26 K/UL — SIGNIFICANT CHANGE UP (ref 4.8–10.8)
WBC # FLD AUTO: 6.26 K/UL — SIGNIFICANT CHANGE UP (ref 4.8–10.8)

## 2023-06-07 PROCEDURE — 93306 TTE W/DOPPLER COMPLETE: CPT | Mod: 26

## 2023-06-07 PROCEDURE — C1889: CPT

## 2023-06-07 PROCEDURE — 92986 REVISION OF AORTIC VALVE: CPT

## 2023-06-07 PROCEDURE — C1894: CPT

## 2023-06-07 PROCEDURE — C1887: CPT

## 2023-06-07 PROCEDURE — 36415 COLL VENOUS BLD VENIPUNCTURE: CPT

## 2023-06-07 PROCEDURE — 80048 BASIC METABOLIC PNL TOTAL CA: CPT

## 2023-06-07 PROCEDURE — 93306 TTE W/DOPPLER COMPLETE: CPT

## 2023-06-07 PROCEDURE — 85027 COMPLETE CBC AUTOMATED: CPT

## 2023-06-07 PROCEDURE — 87635 SARS-COV-2 COVID-19 AMP PRB: CPT

## 2023-06-07 PROCEDURE — C1760: CPT

## 2023-06-07 PROCEDURE — 83735 ASSAY OF MAGNESIUM: CPT

## 2023-06-07 PROCEDURE — 93005 ELECTROCARDIOGRAM TRACING: CPT

## 2023-06-07 PROCEDURE — 93458 L HRT ARTERY/VENTRICLE ANGIO: CPT | Mod: 59

## 2023-06-07 PROCEDURE — C1725: CPT

## 2023-06-07 PROCEDURE — C1769: CPT

## 2023-06-07 RX ORDER — NOREPINEPHRINE BITARTRATE/D5W 8 MG/250ML
0.02 PLASTIC BAG, INJECTION (ML) INTRAVENOUS
Qty: 8 | Refills: 0 | Status: DISCONTINUED | OUTPATIENT
Start: 2023-06-07 | End: 2023-06-08

## 2023-06-07 RX ORDER — METOPROLOL TARTRATE 50 MG
25 TABLET ORAL DAILY
Refills: 0 | Status: DISCONTINUED | OUTPATIENT
Start: 2023-06-07 | End: 2023-06-08

## 2023-06-07 RX ORDER — PANTOPRAZOLE SODIUM 20 MG/1
40 TABLET, DELAYED RELEASE ORAL
Refills: 0 | Status: DISCONTINUED | OUTPATIENT
Start: 2023-06-07 | End: 2023-06-08

## 2023-06-07 RX ADMIN — Medication 25 MILLIGRAM(S): at 16:22

## 2023-06-07 RX ADMIN — Medication 1 TABLET(S): at 16:21

## 2023-06-07 NOTE — PATIENT PROFILE ADULT - FALL HARM RISK - HARM RISK INTERVENTIONS
Assistance with ambulation/Assistance OOB with selected safe patient handling equipment/Communicate Risk of Fall with Harm to all staff/Discuss with provider need for PT consult/Monitor gait and stability/Reinforce activity limits and safety measures with patient and family/Tailored Fall Risk Interventions/Visual Cue: Yellow wristband and red socks/Bed in lowest position, wheels locked, appropriate side rails in place/Call bell, personal items and telephone in reach/Instruct patient to call for assistance before getting out of bed or chair/Non-slip footwear when patient is out of bed/[place] to call system/Physically safe environment - no spills, clutter or unnecessary equipment/Purposeful Proactive Rounding/Room/bathroom lighting operational, light cord in reach

## 2023-06-07 NOTE — CHART NOTE - NSCHARTNOTEFT_GEN_A_CORE
PRE-OP DIAGNOSIS:    Severe symptomatic AS, here for planned RHC/LHC/Coronary Angiogram and BAV    PROCEDURE:     [x] Coronary Angiogram     [x] LHC     [] LVG     [x] RHC     [x] Intervention (see below)         PHYSICIAN:  Dr. Will    ASSISTANT:  Dr. Florian       PROCEDURE DESCRIPTION:     Consent:      [x] Patient     [] Family Member     []  Used        Anesthesia:     [] General     [x] Sedation performed by anesthesia    [x] Local        Access & Closure:     [] Fr Radial Artery     [x] 12 Fr right Femoral Artery (Perclose)    [] Fr Femoral Vein     [] Fr Brachial Vein       IV Contrast: 60 mL        Intervention:   - Successful Balloon Aortic Valvuloplasty with 20mm Truedilatation balloon with improvement in mean AV gradient from 76 mmHg to 55 mmHg    Implants: none       FINDINGS:     Coronary Dominance: right dominant      LM: normal    LAD: normal    CX: normal     RCA: normal         LVEDP: 20 mmHg     RHC:  PCWP: 23/17/15 mmHg (pre-BAV), 20/15/14 mmHg (post-BAV)  PA: 77/29/48 mmHg  RV: 77/14 mmHg  RA: 16 mmHg  CO/CI: 6.73/3.83 (Thermodilution), 6.41/3.64 (Everardo's assumed)  AV gradients:   PVR: 5.15 Wood Units    EF: normal on 2d echo        ESTIMATED BLOOD LOSS: < 10 mL        CONDITION:     [x] Good     [] Fair     [] Critical        SPECIMEN REMOVED: N/A       POST-OP DIAGNOSIS:      [x] Normal Coronary Angiogram, successful BAV of severe AS    [] Mild Coronary Artery Disease (< 50% stenosis)     [] __ Vessel Coronary Artery Disease        PLAN OF CARE:     [x] Admit for observation     [x] Medications:   - Metoprolol, torsemide    [x] IV Fluids: No IV fluids, monitor volume status, monitor urine output    Repeat 2d echo tomorrow PRE-OP DIAGNOSIS:    Severe symptomatic AS, here for planned RHC/LHC/Coronary Angiogram and BAV    PROCEDURE:     [x] Coronary Angiogram     [x] LHC     [] LVG     [x] RHC     [x] Intervention (see below)         PHYSICIAN:  Dr. Will    ASSISTANT:  Dr. Florian       PROCEDURE DESCRIPTION:     Consent:      [x] Patient     [] Family Member     []  Used        Anesthesia:     [] General     [x] Sedation performed by anesthesia    [x] Local        Access & Closure:     [] Fr Radial Artery     [x] 12 Fr right Femoral Artery (Perclose)    [] Fr Femoral Vein     [] Fr Brachial Vein       IV Contrast: 60 mL        Intervention:   - Successful Balloon Aortic Valvuloplasty with 20mm Truedilatation balloon with improvement in mean AV gradient from 76 mmHg to 55 mmHg    Implants: none       FINDINGS:     Coronary Dominance: right dominant      LM: normal    LAD: normal    CX: normal     RCA: normal         LVEDP: 20 mmHg     RHC:  PCWP: 23/17/15 mmHg (pre-BAV), 20/15/14 mmHg (post-BAV)  PA: 77/29/48 mmHg  RV: 77/14 mmHg  RA: 16 mmHg  CO/CI: 6.73/3.83 (Thermodilution), 6.41/3.64 (Everardo's assumed)  AV gradients:   PVR: 5.15 Wood Units    EF: normal on 2d echo        ESTIMATED BLOOD LOSS: < 10 mL        CONDITION:     [x] Good     [] Fair     [] Critical        SPECIMEN REMOVED: N/A       POST-OP DIAGNOSIS:      [x] Normal Coronary Angiogram, successful BAV of severe AS    [] Mild Coronary Artery Disease (< 50% stenosis)     [] __ Vessel Coronary Artery Disease        PLAN OF CARE:     [x] Admit for observation     [x] Medications:   - Metoprolol, torsemide    [x] IV Fluids: No IV fluids, monitor volume status, monitor urine output    Repeat 2d echo tomorrow    SEE CATH REPORT FOR ATTENDING IMPRESSION

## 2023-06-07 NOTE — PROGRESS NOTE ADULT - ASSESSMENT
ASSESSMENT  #Severe AS s/p BAV  #Severe pHTN (81mmHg in Apr 2023)  #Hypertension  #Atrial Tachycardia, h/o    PLAN    CNS  - Avoid sedatives    HEENT  - Oral care    PULMONARY:   - HOB at 45  - Aspiration precautions    CARDIOVASCULAR  - S/P BAV (today): 20mm Truedilatation balloon with improvement in mean AV gradient from 76 mmHg to 55 mmHg  - LHC (today): normal coronary angiogram  - RHC (today): PCWP 23/17/15 mmHg (pre-BAV); 20/15/14 mmHg (post-BAV)  - c/w Toprol XL 25 qd and torsemide 20 qd  - d/c R brachial vein sheath, RFV sheath, and L radial a-line  - wean, then d/c levophed  - obtain TTE (performed; pending read)    GI  - Diet: DASH/TLC  - GI ppx: pantoprazole 40mg PO QD    RENAL  - strict I&Os, daily weight, and monitor volume status  - Follow up renal function and lytes, correct as needed (K>4; Mg>2)    INFECTIOUS DISEASE  - no signs of infection  - monitor VS    HEMATOLOGICAL  - DVT ppx    ENDOCRINE  - Monitor FS  - insulin protocol if needed    DISPOSITION: CCU

## 2023-06-07 NOTE — PROGRESS NOTE ADULT - ATTENDING COMMENTS
s/p BAV without complication.  Hemodynamics stable.  No groin hematoma.    - ECHO  - Cont Metoprolol and Torsemide  - Plan for discharge tomorrow.

## 2023-06-07 NOTE — CHART NOTE - NSCHARTNOTEFT_GEN_A_CORE
PREOPERATIVE DAY OF PROCEDURE EVALUATION:  I have personally seen and examined the patient.  I agree with the history and physical which I have reviewed and noted any changes below.  (Signed electronically by __________)  06-07-23 @ 06:35      Cath Bleeding Risk:     Prehydration: none, d/t AS/BAV procedure    77 year F, never smoker, with a PMHx of HTN, Aortic stenosis, atrial tachycardia, chronic venous stasis, HTN, NSTEMI, pulmonary HTN, SVT, arthritis, BARON, breast lumpectomy, CHF.  Symptoms include SOB. Patient presents today for BAV. PREOPERATIVE DAY OF PROCEDURE EVALUATION:  I have personally seen and examined the patient.  I agree with the history and physical which I have reviewed and noted any changes below.  (Signed electronically by Guille Will).      Cath Bleeding Risk:     Prehydration: none, d/t AS/BAV procedure    77 year F, never smoker, with a PMHx of HTN, Aortic stenosis, atrial tachycardia, chronic venous stasis, HTN, NSTEMI, pulmonary HTN, SVT, arthritis, BARON, breast lumpectomy, CHF.  Symptoms include SOB. Patient presents today for BAV.

## 2023-06-07 NOTE — PRE-ANESTHESIA EVALUATION ADULT - NSANTHOSAYNRD_GEN_A_CORE
denies/No. CHARLES screening performed.  STOP BANG Legend: 0-2 = LOW Risk; 3-4 = INTERMEDIATE Risk; 5-8 = HIGH Risk

## 2023-06-07 NOTE — ASU PATIENT PROFILE, ADULT - FALL HARM RISK - HARM RISK INTERVENTIONS

## 2023-06-07 NOTE — PROGRESS NOTE ADULT - SUBJECTIVE AND OBJECTIVE BOX
SUBJECTIVE / OVERNIGHT EVENTS  S/P elective BAV for severe AS earlier today. Currently on Levophed gtt @ 0.03 and NS @ 75 cc/hr after procedure. No acute complaints reported post-operatively. Patient does not report fevers, chills, CP, SOB, or n/v/d.    MEDICATIONS  metoprolol succinate ER 25 milliGRAM(s) Oral daily  multivitamin 1 Tablet(s) Oral daily  norepinephrine Infusion 0.02 MICROgram(s)/kG/Min IV Continuous <Continuous>  pantoprazole    Tablet 40 milliGRAM(s) Oral before breakfast  torsemide 20 milliGRAM(s) Oral daily    VITALS /  EXAM    T(C): 35.8 (06-07-23 @ 16:00), Max: 36 (06-07-23 @ 12:00)  HR: 75 (06-07-23 @ 18:00) (69 - 79)  BP: 91/55 (06-07-23 @ 18:00) (91/55 - 143/66)  RR: 29 (06-07-23 @ 18:00) (17 - 46)  SpO2: 94% (06-07-23 @ 18:00) (93% - 100%)    GENERAL: NAD, well-developed  CHEST/LUNG: Clear to auscultation bilaterally; No wheezes, rales or rhonchi  HEART: Regular rate and rhythm; No murmurs, rubs, or gallops  ABDOMEN: Soft, Nontender, Nondistended; Bowel sounds present, no masses  EXTREMITIES: RFV and R brachial vein sheath in place; L radial a-line in place; 2+ Peripheral Pulses, No clubbing, cyanosis, or edema    I's & O's     06-07-23 @ 07:01  -  06-07-23 @ 18:28  --------------------------------------------------------  IN:    Norepinephrine: 8.4 mL    Oral Fluid: 200 mL  Total IN: 208.4 mL    OUT:    Voided (mL): 650 mL  Total OUT: 650 mL    Total NET: -441.6 mL    LABS             10.5   6.26  )-----------( 431      ( 06-07-23 @ 07:31 )             33.9     137  |  97  |  18  -------------------------<  82   06-07-23 @ 07:31  3.6  |  31  |  0.8    Ca      8.6     06-07-23 @ 07:31    MICRO / IMAGING / CARDIOLOGY  Telemetry: Reviewed   EKG: Reviewed

## 2023-06-08 ENCOUNTER — TRANSCRIPTION ENCOUNTER (OUTPATIENT)
Age: 78
End: 2023-06-08

## 2023-06-08 VITALS
SYSTOLIC BLOOD PRESSURE: 112 MMHG | OXYGEN SATURATION: 95 % | HEART RATE: 67 BPM | RESPIRATION RATE: 20 BRPM | DIASTOLIC BLOOD PRESSURE: 58 MMHG

## 2023-06-08 LAB
ANION GAP SERPL CALC-SCNC: 10 MMOL/L — SIGNIFICANT CHANGE UP (ref 7–14)
BUN SERPL-MCNC: 14 MG/DL — SIGNIFICANT CHANGE UP (ref 10–20)
CALCIUM SERPL-MCNC: 8.2 MG/DL — LOW (ref 8.4–10.5)
CHLORIDE SERPL-SCNC: 103 MMOL/L — SIGNIFICANT CHANGE UP (ref 98–110)
CO2 SERPL-SCNC: 29 MMOL/L — SIGNIFICANT CHANGE UP (ref 17–32)
CREAT SERPL-MCNC: 0.8 MG/DL — SIGNIFICANT CHANGE UP (ref 0.7–1.5)
EGFR: 75 ML/MIN/1.73M2 — SIGNIFICANT CHANGE UP
GLUCOSE SERPL-MCNC: 91 MG/DL — SIGNIFICANT CHANGE UP (ref 70–99)
HCT VFR BLD CALC: 27.8 % — LOW (ref 37–47)
HGB BLD-MCNC: 8.6 G/DL — LOW (ref 12–16)
MAGNESIUM SERPL-MCNC: 1.7 MG/DL — LOW (ref 1.8–2.4)
MCHC RBC-ENTMCNC: 25.5 PG — LOW (ref 27–31)
MCHC RBC-ENTMCNC: 30.9 G/DL — LOW (ref 32–37)
MCV RBC AUTO: 82.5 FL — SIGNIFICANT CHANGE UP (ref 81–99)
NRBC # BLD: 0 /100 WBCS — SIGNIFICANT CHANGE UP (ref 0–0)
PLATELET # BLD AUTO: 350 K/UL — SIGNIFICANT CHANGE UP (ref 130–400)
PMV BLD: 9.4 FL — SIGNIFICANT CHANGE UP (ref 7.4–10.4)
POTASSIUM SERPL-MCNC: 3.9 MMOL/L — SIGNIFICANT CHANGE UP (ref 3.5–5)
POTASSIUM SERPL-SCNC: 3.9 MMOL/L — SIGNIFICANT CHANGE UP (ref 3.5–5)
RBC # BLD: 3.37 M/UL — LOW (ref 4.2–5.4)
RBC # FLD: 20.7 % — HIGH (ref 11.5–14.5)
SARS-COV-2 RNA SPEC QL NAA+PROBE: SIGNIFICANT CHANGE UP
SODIUM SERPL-SCNC: 142 MMOL/L — SIGNIFICANT CHANGE UP (ref 135–146)
WBC # BLD: 5.78 K/UL — SIGNIFICANT CHANGE UP (ref 4.8–10.8)
WBC # FLD AUTO: 5.78 K/UL — SIGNIFICANT CHANGE UP (ref 4.8–10.8)

## 2023-06-08 PROCEDURE — 93010 ELECTROCARDIOGRAM REPORT: CPT

## 2023-06-08 PROCEDURE — 99024 POSTOP FOLLOW-UP VISIT: CPT

## 2023-06-08 RX ORDER — MAGNESIUM SULFATE 500 MG/ML
2 VIAL (ML) INJECTION ONCE
Refills: 0 | Status: COMPLETED | OUTPATIENT
Start: 2023-06-08 | End: 2023-06-08

## 2023-06-08 RX ORDER — CHLORHEXIDINE GLUCONATE 213 G/1000ML
1 SOLUTION TOPICAL DAILY
Refills: 0 | Status: DISCONTINUED | OUTPATIENT
Start: 2023-06-08 | End: 2023-06-08

## 2023-06-08 RX ADMIN — PANTOPRAZOLE SODIUM 40 MILLIGRAM(S): 20 TABLET, DELAYED RELEASE ORAL at 07:08

## 2023-06-08 RX ADMIN — Medication 1 TABLET(S): at 11:44

## 2023-06-08 RX ADMIN — Medication 20 MILLIGRAM(S): at 05:04

## 2023-06-08 RX ADMIN — CHLORHEXIDINE GLUCONATE 1 APPLICATION(S): 213 SOLUTION TOPICAL at 11:46

## 2023-06-08 RX ADMIN — Medication 25 MILLIGRAM(S): at 05:03

## 2023-06-08 RX ADMIN — Medication 25 GRAM(S): at 08:28

## 2023-06-08 NOTE — DISCHARGE NOTE PROVIDER - HOSPITAL COURSE
Patient is a 77 year old female presenting to PAST in preparation for  balloon Aortic valvuloplasty on 6/6  under sedation anesthesia by Dr. Will  Pt with h/o uterine cancer which will require intervention However  she has aortic stenosis , will have BAV to relieve the pressure in her heart    Pt is a poor historian info obtained from MD note She resided in a NH and was not accompanied by staff.  PATIENT CURRENTLY DENIES CHEST PAIN  SHORTNESS OF BREATH  PALPITATIONS,  DYSURIA, OR UPPER RESPIRATORY INFECTION IN PAST 2 WEEKS  EXERCISE  TOLERANCE  1/2 FLIGHT OF STAIRS  WITHOUT SHORTNESS OF BREATH  She presented to PAST in a wheelchair but reports she is able to walk short distances without sob      Patient had LHC/RHC 6/7, w/ R femoral artery access. Patient had successful balloon aortic valvuloplasty  20mm Truedilatation balloon with improvement in mean AV gradient from 76 mmHg to 55 mmHg. Normal coronary angiogram. Patient monitored in CCU for 24hours without complications or events.   Post-procedural TTE     1. Hyperdynamic global left ventricular systolic function.   2. LV Ejection Fraction by Salcido's Method with a biplane EF of 78 %.   3. Severe concentric left ventricular hypertrophy.   4. Spectral Doppler shows pseudonormal pattern of left ventricular   myocardial filling (Grade II diastolic dysfunction).   5. Mildly enlarged left atrium.   6. Normal right atrial size.   7. Mild mitral annular calcification.   8. Thickening of the anterior and posterior mitral valve leaflets.   9. Trace mitral valve regurgitation.  10. Mild aortic regurgitation.  11. Severe aortic stenosis, peak/mean AV /68 mmHg, HARRIET 1 cm^2.    Medically stable for d/c to NH.

## 2023-06-08 NOTE — DISCHARGE NOTE PROVIDER - NSDCCPCAREPLAN_GEN_ALL_CORE_FT
PRINCIPAL DISCHARGE DIAGNOSIS  Diagnosis: Aortic stenosis  Assessment and Plan of Treatment: You were admitted for a cardiac procedure which involved one of your heart valves. We increased the size of your aortic valve. The procedure went without complications. You will follow up with your cardiologist to continue monitoring your heart function. If you experience any chest pain/palpitations/shortness of breath/lightheadedness or dizziness please come to the ED.

## 2023-06-08 NOTE — DISCHARGE NOTE PROVIDER - CARE PROVIDER_API CALL
MARGARITA SUTTON [address] [address]  Phone: [phone]  Fax: [phone]  Follow Up Time: 1 month    Guille Will  Interventional Cardiology  [address] [place] [address], [facility] [address]  [place] [address]  Phone: [phone]  Fax: [phone]  Follow Up Time: 2 weeks

## 2023-06-08 NOTE — DISCHARGE NOTE PROVIDER - PROVIDER TOKENS
PROVIDER:[TOKEN:[[id number]],FOLLOWUP:[1 month]],PROVIDER:[TOKEN:[[id number]],FOLLOWUP:[2 weeks]]

## 2023-06-08 NOTE — DISCHARGE NOTE PROVIDER - ATTENDING DISCHARGE PHYSICAL EXAMINATION:
s/p elective BAV without complication.  Breathing comfortable.  Hemodynamics stable.  No access site complication.  ECHO: nL LVSF.  Severe AS.  Mild AI.  Follow-up Valve Clinic

## 2023-06-08 NOTE — DISCHARGE NOTE PROVIDER - NSDCMRMEDTOKEN_GEN_ALL_CORE_FT
GlycoLax oral powder for reconstitution: 17 orally once a day  Multiple Vitamins oral tablet: 1 tablet orally once a day  pantoprazole 40 mg oral delayed release tablet: 1 tab(s) orally 2 times a day  Toprol-XL 25 mg oral tablet, extended release: 1 tab(s) orally once a day  torsemide 20 mg oral tablet: 1 tab(s) orally once a day  Vitamin A and D topical ointment: Apply topically to affected area 2 times a day

## 2023-06-08 NOTE — DISCHARGE NOTE NURSING/CASE MANAGEMENT/SOCIAL WORK - NSDCPEFALRISK_GEN_ALL_CORE
For information on Fall & Injury Prevention, visit: [email or web address] OR  [email or web address] OR  https://www.cdc.gov/steadi/patient.html

## 2023-06-08 NOTE — DISCHARGE NOTE NURSING/CASE MANAGEMENT/SOCIAL WORK - PATIENT PORTAL LINK FT
You can access the FollowMyHealth Patient Portal offered by [facility] by registering at the following website: [email or web address]. By joining [facility]’s FollowMyHealth portal, you will also be able to view your health information using other applications (apps) compatible with our system.

## 2023-06-14 PROBLEM — C53.9 MALIGNANT NEOPLASM OF CERVIX UTERI, UNSPECIFIED: Chronic | Status: ACTIVE | Noted: 2023-05-30

## 2023-06-14 PROBLEM — I50.30 UNSPECIFIED DIASTOLIC (CONGESTIVE) HEART FAILURE: Chronic | Status: ACTIVE | Noted: 2023-05-30

## 2023-06-14 PROBLEM — I27.20 PULMONARY HYPERTENSION, UNSPECIFIED: Chronic | Status: ACTIVE | Noted: 2023-05-30

## 2023-06-22 DIAGNOSIS — I11.0 HYPERTENSIVE HEART DISEASE WITH HEART FAILURE: ICD-10-CM

## 2023-06-22 DIAGNOSIS — I35.0 NONRHEUMATIC AORTIC (VALVE) STENOSIS: ICD-10-CM

## 2023-06-22 DIAGNOSIS — I50.32 CHRONIC DIASTOLIC (CONGESTIVE) HEART FAILURE: ICD-10-CM

## 2023-06-22 DIAGNOSIS — Z20.822 CONTACT WITH AND (SUSPECTED) EXPOSURE TO COVID-19: ICD-10-CM

## 2023-06-22 DIAGNOSIS — I27.20 PULMONARY HYPERTENSION, UNSPECIFIED: ICD-10-CM

## 2023-06-22 DIAGNOSIS — I87.8 OTHER SPECIFIED DISORDERS OF VEINS: ICD-10-CM

## 2023-06-22 DIAGNOSIS — Z79.899 OTHER LONG TERM (CURRENT) DRUG THERAPY: ICD-10-CM

## 2023-06-22 DIAGNOSIS — C55 MALIGNANT NEOPLASM OF UTERUS, PART UNSPECIFIED: ICD-10-CM

## 2023-06-22 DIAGNOSIS — M19.90 UNSPECIFIED OSTEOARTHRITIS, UNSPECIFIED SITE: ICD-10-CM

## 2023-06-22 DIAGNOSIS — I25.2 OLD MYOCARDIAL INFARCTION: ICD-10-CM

## 2023-06-22 DIAGNOSIS — E83.42 HYPOMAGNESEMIA: ICD-10-CM

## 2023-07-03 ENCOUNTER — APPOINTMENT (OUTPATIENT)
Dept: CARDIOLOGY | Facility: CLINIC | Age: 78
End: 2023-07-03

## 2023-07-06 ENCOUNTER — APPOINTMENT (OUTPATIENT)
Dept: CARDIOLOGY | Facility: CLINIC | Age: 78
End: 2023-07-06
Payer: MEDICARE

## 2023-07-06 VITALS
DIASTOLIC BLOOD PRESSURE: 70 MMHG | RESPIRATION RATE: 12 BRPM | TEMPERATURE: 98 F | HEIGHT: 66 IN | WEIGHT: 142 LBS | SYSTOLIC BLOOD PRESSURE: 103 MMHG | HEART RATE: 80 BPM | BODY MASS INDEX: 22.82 KG/M2 | OXYGEN SATURATION: 98 %

## 2023-07-06 DIAGNOSIS — I50.30 UNSPECIFIED DIASTOLIC (CONGESTIVE) HEART FAILURE: ICD-10-CM

## 2023-07-06 DIAGNOSIS — Q24.4 CONGENITAL SUBAORTIC STENOSIS: ICD-10-CM

## 2023-07-06 PROCEDURE — 99214 OFFICE O/P EST MOD 30 MIN: CPT | Mod: 24

## 2023-07-09 NOTE — PHYSICAL EXAM
[Well Developed] : well developed [Well Nourished] : well nourished [Normal S1, S2] : normal S1, S2 [Clear Lung Fields] : clear lung fields [Good Air Entry] : good air entry [Soft] : abdomen soft [Non Tender] : non-tender [Normal] : alert and oriented, normal memory [de-identified] : Wheelchair, unable to assess

## 2023-07-09 NOTE — ASSESSMENT
[FreeTextEntry1] : Ms. JESÚS TERRELL 77 year F, never smoker, Here today for discussion of AS - She is now s/p BAV on 6/7/2023. PMHx of HTN, Aortic stenosis, atrial tachycardia, chronic venous stasis, HTN, NSTEMI, pulmonary HTN, SVT, arthritis, BARON, breast lumpectomy, CHF.  \par \par Plan:\par S/P BAV for critical AS, did not have symptoms preop, reports no change \par B/l Groins soft, no ecchymosis noted\par Her sister endorses immediately after procedure she was more alert and doing well, but is now back to baseline, patient reports she has had no change in symptoms\par She is not too eager to go for hysterectomy for her Stage I Endometrial Cancer, states she'd prefer non-surgical options\par F/U with [facility] in 6 months with Echocardiogram\par She was encouraged to participate more in PT\par F/U with Dr. Wilburn to discuss options for her cancer, RT versus surgery \par F/U with PMD for routine medical care\par \par I, [facility], am acting as scribe for

## 2023-07-09 NOTE — REVIEW OF SYSTEMS
Attempted to call report for room 2115, nurse unable to take report.   [Dyspnea on exertion] : dyspnea during exertion [Negative] : Heme/Lymph [Fever] : no fever [Headache] : no headache [Chills] : no chills [Feeling Fatigued] : not feeling fatigued [SOB] : no shortness of breath [Chest Discomfort] : no chest discomfort [Syncope] : no syncope [Orthopnea] : no orthopnea [FreeTextEntry9] : Wheelchair

## 2023-07-09 NOTE — HISTORY OF PRESENT ILLNESS
[FreeTextEntry1] : Ms. JESÚS TERRELL 77 year F, never smoker, Here today for discussion of AS - She is now s/p BAV on 6/7/2023. PMHx of HTN, Aortic stenosis, atrial tachycardia, chronic venous stasis, HTN, NSTEMI, pulmonary HTN, SVT, arthritis, BARON, breast lumpectomy, CHF.  Patient had LHC/RHC 6/7, w/ R femoral artery access and successful balloon aortic valvuloplasty  20mm True dilatation balloon with improvement in mean AV gradient from 76 mmHg to 55 mmHg. Normal coronary angiogram. Here for F/U after BAV.\par \par Here today for f/u s/p BAV, she is currently back in [facility]\par Has not been actively participating in PT at NH, has been refusing, they may discharge her from rehab\par Presents with sister Sabra\par NYHA class II\par At [facility] for STR was living alone prior no aide\par Presents in stretcher - unable to ambulate today \par \par Their healthcare team includes the following\par PMD: Epifanio \par Cardio: Murlerstein? pt states he is on SI and she sees him regularly \par \par

## 2023-07-09 NOTE — END OF VISIT
[FreeTextEntry3] : Presents with family.\par \par s/p BAV without complication.\par Modest gradient reduction (balloon size limited by calcium).\par Severe pulmonary HTN disproportionate to left-sided disease.\par \par Being evaluated for endometrial cancer.  Considering hysterectomy versus radiation (patient favors).\par \par Inactive / not walking.  Does not really participate in PT and is admittedly apathetic.\par Breathing comfortable.\par Relatively compensated.\par \par Overall long-term prognosis poor.  Unclear that benefit of TAVR is greater than risk and Lola is not particularly motivated / excited about procedures.  Will defer at present.\par \par Regular cardiology follow-up.\par Follow-up [facility] 6-months.

## 2023-07-18 ENCOUNTER — APPOINTMENT (OUTPATIENT)
Dept: GYNECOLOGIC ONCOLOGY | Facility: CLINIC | Age: 78
End: 2023-07-18
Payer: MEDICARE

## 2023-07-18 ENCOUNTER — OUTPATIENT (OUTPATIENT)
Dept: OUTPATIENT SERVICES | Facility: HOSPITAL | Age: 78
LOS: 1 days | End: 2023-07-18
Payer: MEDICARE

## 2023-07-18 VITALS
RESPIRATION RATE: 12 BRPM | SYSTOLIC BLOOD PRESSURE: 105 MMHG | DIASTOLIC BLOOD PRESSURE: 57 MMHG | TEMPERATURE: 97.2 F | HEART RATE: 71 BPM

## 2023-07-18 DIAGNOSIS — C55 MALIGNANT NEOPLASM OF UTERUS, PART UNSPECIFIED: ICD-10-CM

## 2023-07-18 DIAGNOSIS — I27.20 PULMONARY HYPERTENSION, UNSPECIFIED: ICD-10-CM

## 2023-07-18 DIAGNOSIS — Z98.890 OTHER SPECIFIED POSTPROCEDURAL STATES: Chronic | ICD-10-CM

## 2023-07-18 DIAGNOSIS — Z98.890 OTHER SPECIFIED POSTPROCEDURAL STATES: ICD-10-CM

## 2023-07-18 PROCEDURE — 99214 OFFICE O/P EST MOD 30 MIN: CPT

## 2023-07-18 NOTE — DISCUSSION/SUMMARY
[Reviewed Clinical Lab Test(s)] : Results of clinical tests were reviewed. [Reviewed Radiology Report(s)] : Radiology reports were reviewed. [Reviewed Radiology Film/Image(s)] : Images from radiology studies were reviewed and examined. [Visit Time ___ Minutes] : [unfilled] minutes [Face to Face Time___ Minutes] : with [unfilled] minutes in face to face consultation. [FreeTextEntry1] : Endometrial cancer on Bx, (no grade assigned yet)\par Had a long conversation with the patient and her family regarding her treatment. \par Surgery has a significantly higher cure rate than the alternative of RT, but the side effects are different.\par \par The RT side effects are chronic and in general include radiation damage to the adjacent organs. \par The surgical risk revolves around her cardiac condition and the risk of surgery associated MI and death.\par \par She has expressed to me that she does not want to take the risks associated with surgery, even if the cure rate is higher. She will therefore be referred to Radiation oncology for further management.\par \par All questions answered, concerns addressed\par \par Return in 3 months or prn\par

## 2023-07-18 NOTE — HISTORY OF PRESENT ILLNESS
[FreeTextEntry1] : 76 y/o  presents with 1-2 years of PMB. Recently seen in the hospital and had EMBx done showed endometrial cancer\par \par She has multiple co morbidities including severe AS, venous stasis and HTN, prior localized breast ca many years prior (aprox 30)\par PSH no abdominal surgery\par FHx M with breast ca, Father with lung/prostate ca, B with H&N ca \par \par Seen by cardiology , underwent baboon angioplasty without any significant change in her Sx\par She is in a nursing home, but describes her activity level as limited, she has no CP with her PT\par \par \par

## 2023-07-20 DIAGNOSIS — I27.20 PULMONARY HYPERTENSION, UNSPECIFIED: ICD-10-CM

## 2023-07-20 DIAGNOSIS — I35.0 NONRHEUMATIC AORTIC (VALVE) STENOSIS: ICD-10-CM

## 2023-07-20 DIAGNOSIS — Z98.890 OTHER SPECIFIED POSTPROCEDURAL STATES: ICD-10-CM

## 2023-07-20 DIAGNOSIS — C55 MALIGNANT NEOPLASM OF UTERUS, PART UNSPECIFIED: ICD-10-CM

## 2023-08-16 NOTE — ED PROVIDER NOTE - DATE/TIME 2
[FreeTextEntry1] : 12yo female with pmh eczema who is here for initial evaluation of headaches. Headaches are meeting criteria for migraines without aura, happening multiple times a week. Vestibular component that was there in the past has resolved, was likely secondary to vestibulopathy. Neurological exam non focal. Discussed further prevention and abortive options.   increase Magnesium 400mg nightly and Vitamin B2 (riboflavin) 400mg nightly Naproxen 375 mg BID as needed for headaches, do not take more than 3-4 times a week (make sure she has it at school) take rizatriptan 5mg as needed for severe headaches, can repeat in 2 hours, do not take more than twice a day and no more than 2 days a week headache diary  Lifestyle Goals:  Regular sleep/waking times (on both weekdays and weekends) - Children 3-4yo:10-13 hrs; 6-13yo: 9-12 hrs; teens 13+: 8-10 hrs  Regular exercise - 30 mins a day, 5 days a week  Regular meals (protein rich breakfast within 30 min of waking and no skipping meals)  Stay hydrated (1 ounce/kg body weight, 8-10 cups of water per day for teens)  Can refer to www.headachereliefguide.com  for more information on healthy habits
11-May-2023 23:35

## 2023-11-07 ENCOUNTER — EMERGENCY (EMERGENCY)
Facility: HOSPITAL | Age: 78
LOS: 0 days | Discharge: HOME | End: 2023-11-08
Attending: EMERGENCY MEDICINE
Payer: MEDICARE

## 2023-11-07 VITALS
SYSTOLIC BLOOD PRESSURE: 120 MMHG | TEMPERATURE: 99 F | DIASTOLIC BLOOD PRESSURE: 58 MMHG | OXYGEN SATURATION: 95 % | HEART RATE: 81 BPM | WEIGHT: 149.91 LBS | RESPIRATION RATE: 17 BRPM

## 2023-11-07 DIAGNOSIS — R79.89 OTHER SPECIFIED ABNORMAL FINDINGS OF BLOOD CHEMISTRY: ICD-10-CM

## 2023-11-07 DIAGNOSIS — Z98.890 OTHER SPECIFIED POSTPROCEDURAL STATES: Chronic | ICD-10-CM

## 2023-11-07 DIAGNOSIS — M19.90 UNSPECIFIED OSTEOARTHRITIS, UNSPECIFIED SITE: ICD-10-CM

## 2023-11-07 DIAGNOSIS — N93.9 ABNORMAL UTERINE AND VAGINAL BLEEDING, UNSPECIFIED: ICD-10-CM

## 2023-11-07 DIAGNOSIS — I11.0 HYPERTENSIVE HEART DISEASE WITH HEART FAILURE: ICD-10-CM

## 2023-11-07 DIAGNOSIS — C55 MALIGNANT NEOPLASM OF UTERUS, PART UNSPECIFIED: ICD-10-CM

## 2023-11-07 DIAGNOSIS — I50.30 UNSPECIFIED DIASTOLIC (CONGESTIVE) HEART FAILURE: ICD-10-CM

## 2023-11-07 DIAGNOSIS — D64.9 ANEMIA, UNSPECIFIED: ICD-10-CM

## 2023-11-07 LAB
ALBUMIN SERPL ELPH-MCNC: 2.5 G/DL — LOW (ref 3.5–5.2)
ALBUMIN SERPL ELPH-MCNC: 2.5 G/DL — LOW (ref 3.5–5.2)
ALP SERPL-CCNC: 201 U/L — HIGH (ref 30–115)
ALP SERPL-CCNC: 201 U/L — HIGH (ref 30–115)
ALT FLD-CCNC: 12 U/L — SIGNIFICANT CHANGE UP (ref 0–41)
ALT FLD-CCNC: 12 U/L — SIGNIFICANT CHANGE UP (ref 0–41)
ANION GAP SERPL CALC-SCNC: 9 MMOL/L — SIGNIFICANT CHANGE UP (ref 7–14)
ANION GAP SERPL CALC-SCNC: 9 MMOL/L — SIGNIFICANT CHANGE UP (ref 7–14)
AST SERPL-CCNC: 26 U/L — SIGNIFICANT CHANGE UP (ref 0–41)
AST SERPL-CCNC: 26 U/L — SIGNIFICANT CHANGE UP (ref 0–41)
BASOPHILS # BLD AUTO: 0.03 K/UL — SIGNIFICANT CHANGE UP (ref 0–0.2)
BASOPHILS # BLD AUTO: 0.03 K/UL — SIGNIFICANT CHANGE UP (ref 0–0.2)
BASOPHILS NFR BLD AUTO: 0.5 % — SIGNIFICANT CHANGE UP (ref 0–1)
BASOPHILS NFR BLD AUTO: 0.5 % — SIGNIFICANT CHANGE UP (ref 0–1)
BILIRUB SERPL-MCNC: 0.2 MG/DL — SIGNIFICANT CHANGE UP (ref 0.2–1.2)
BILIRUB SERPL-MCNC: 0.2 MG/DL — SIGNIFICANT CHANGE UP (ref 0.2–1.2)
BLD GP AB SCN SERPL QL: SIGNIFICANT CHANGE UP
BLD GP AB SCN SERPL QL: SIGNIFICANT CHANGE UP
BUN SERPL-MCNC: 11 MG/DL — SIGNIFICANT CHANGE UP (ref 10–20)
BUN SERPL-MCNC: 11 MG/DL — SIGNIFICANT CHANGE UP (ref 10–20)
CALCIUM SERPL-MCNC: 7.8 MG/DL — LOW (ref 8.4–10.5)
CALCIUM SERPL-MCNC: 7.8 MG/DL — LOW (ref 8.4–10.5)
CHLORIDE SERPL-SCNC: 105 MMOL/L — SIGNIFICANT CHANGE UP (ref 98–110)
CHLORIDE SERPL-SCNC: 105 MMOL/L — SIGNIFICANT CHANGE UP (ref 98–110)
CO2 SERPL-SCNC: 24 MMOL/L — SIGNIFICANT CHANGE UP (ref 17–32)
CO2 SERPL-SCNC: 24 MMOL/L — SIGNIFICANT CHANGE UP (ref 17–32)
CREAT SERPL-MCNC: 0.7 MG/DL — SIGNIFICANT CHANGE UP (ref 0.7–1.5)
CREAT SERPL-MCNC: 0.7 MG/DL — SIGNIFICANT CHANGE UP (ref 0.7–1.5)
EGFR: 88 ML/MIN/1.73M2 — SIGNIFICANT CHANGE UP
EGFR: 88 ML/MIN/1.73M2 — SIGNIFICANT CHANGE UP
EOSINOPHIL # BLD AUTO: 0.26 K/UL — SIGNIFICANT CHANGE UP (ref 0–0.7)
EOSINOPHIL # BLD AUTO: 0.26 K/UL — SIGNIFICANT CHANGE UP (ref 0–0.7)
EOSINOPHIL NFR BLD AUTO: 4.5 % — SIGNIFICANT CHANGE UP (ref 0–8)
EOSINOPHIL NFR BLD AUTO: 4.5 % — SIGNIFICANT CHANGE UP (ref 0–8)
GLUCOSE SERPL-MCNC: 101 MG/DL — HIGH (ref 70–99)
GLUCOSE SERPL-MCNC: 101 MG/DL — HIGH (ref 70–99)
HCT VFR BLD CALC: 20 % — LOW (ref 37–47)
HCT VFR BLD CALC: 20 % — LOW (ref 37–47)
HGB BLD-MCNC: 6.1 G/DL — CRITICAL LOW (ref 12–16)
HGB BLD-MCNC: 6.1 G/DL — CRITICAL LOW (ref 12–16)
IMM GRANULOCYTES NFR BLD AUTO: 0.5 % — HIGH (ref 0.1–0.3)
IMM GRANULOCYTES NFR BLD AUTO: 0.5 % — HIGH (ref 0.1–0.3)
LYMPHOCYTES # BLD AUTO: 1.46 K/UL — SIGNIFICANT CHANGE UP (ref 1.2–3.4)
LYMPHOCYTES # BLD AUTO: 1.46 K/UL — SIGNIFICANT CHANGE UP (ref 1.2–3.4)
LYMPHOCYTES # BLD AUTO: 25.2 % — SIGNIFICANT CHANGE UP (ref 20.5–51.1)
LYMPHOCYTES # BLD AUTO: 25.2 % — SIGNIFICANT CHANGE UP (ref 20.5–51.1)
MCHC RBC-ENTMCNC: 25.5 PG — LOW (ref 27–31)
MCHC RBC-ENTMCNC: 25.5 PG — LOW (ref 27–31)
MCHC RBC-ENTMCNC: 30.5 G/DL — LOW (ref 32–37)
MCHC RBC-ENTMCNC: 30.5 G/DL — LOW (ref 32–37)
MCV RBC AUTO: 83.7 FL — SIGNIFICANT CHANGE UP (ref 81–99)
MCV RBC AUTO: 83.7 FL — SIGNIFICANT CHANGE UP (ref 81–99)
MONOCYTES # BLD AUTO: 0.64 K/UL — HIGH (ref 0.1–0.6)
MONOCYTES # BLD AUTO: 0.64 K/UL — HIGH (ref 0.1–0.6)
MONOCYTES NFR BLD AUTO: 11.1 % — HIGH (ref 1.7–9.3)
MONOCYTES NFR BLD AUTO: 11.1 % — HIGH (ref 1.7–9.3)
NEUTROPHILS # BLD AUTO: 3.37 K/UL — SIGNIFICANT CHANGE UP (ref 1.4–6.5)
NEUTROPHILS # BLD AUTO: 3.37 K/UL — SIGNIFICANT CHANGE UP (ref 1.4–6.5)
NEUTROPHILS NFR BLD AUTO: 58.2 % — SIGNIFICANT CHANGE UP (ref 42.2–75.2)
NEUTROPHILS NFR BLD AUTO: 58.2 % — SIGNIFICANT CHANGE UP (ref 42.2–75.2)
NRBC # BLD: 0 /100 WBCS — SIGNIFICANT CHANGE UP (ref 0–0)
NRBC # BLD: 0 /100 WBCS — SIGNIFICANT CHANGE UP (ref 0–0)
PLATELET # BLD AUTO: 385 K/UL — SIGNIFICANT CHANGE UP (ref 130–400)
PLATELET # BLD AUTO: 385 K/UL — SIGNIFICANT CHANGE UP (ref 130–400)
PMV BLD: 9.2 FL — SIGNIFICANT CHANGE UP (ref 7.4–10.4)
PMV BLD: 9.2 FL — SIGNIFICANT CHANGE UP (ref 7.4–10.4)
POTASSIUM SERPL-MCNC: 4.5 MMOL/L — SIGNIFICANT CHANGE UP (ref 3.5–5)
POTASSIUM SERPL-MCNC: 4.5 MMOL/L — SIGNIFICANT CHANGE UP (ref 3.5–5)
POTASSIUM SERPL-SCNC: 4.5 MMOL/L — SIGNIFICANT CHANGE UP (ref 3.5–5)
POTASSIUM SERPL-SCNC: 4.5 MMOL/L — SIGNIFICANT CHANGE UP (ref 3.5–5)
PROT SERPL-MCNC: 5.7 G/DL — LOW (ref 6–8)
PROT SERPL-MCNC: 5.7 G/DL — LOW (ref 6–8)
RBC # BLD: 2.39 M/UL — LOW (ref 4.2–5.4)
RBC # BLD: 2.39 M/UL — LOW (ref 4.2–5.4)
RBC # FLD: 15.5 % — HIGH (ref 11.5–14.5)
RBC # FLD: 15.5 % — HIGH (ref 11.5–14.5)
SODIUM SERPL-SCNC: 138 MMOL/L — SIGNIFICANT CHANGE UP (ref 135–146)
SODIUM SERPL-SCNC: 138 MMOL/L — SIGNIFICANT CHANGE UP (ref 135–146)
WBC # BLD: 5.79 K/UL — SIGNIFICANT CHANGE UP (ref 4.8–10.8)
WBC # BLD: 5.79 K/UL — SIGNIFICANT CHANGE UP (ref 4.8–10.8)
WBC # FLD AUTO: 5.79 K/UL — SIGNIFICANT CHANGE UP (ref 4.8–10.8)
WBC # FLD AUTO: 5.79 K/UL — SIGNIFICANT CHANGE UP (ref 4.8–10.8)

## 2023-11-07 PROCEDURE — 36415 COLL VENOUS BLD VENIPUNCTURE: CPT

## 2023-11-07 PROCEDURE — 86901 BLOOD TYPING SEROLOGIC RH(D): CPT

## 2023-11-07 PROCEDURE — 86923 COMPATIBILITY TEST ELECTRIC: CPT

## 2023-11-07 PROCEDURE — 86850 RBC ANTIBODY SCREEN: CPT

## 2023-11-07 PROCEDURE — 99285 EMERGENCY DEPT VISIT HI MDM: CPT

## 2023-11-07 PROCEDURE — 85025 COMPLETE CBC W/AUTO DIFF WBC: CPT

## 2023-11-07 PROCEDURE — 86900 BLOOD TYPING SEROLOGIC ABO: CPT

## 2023-11-07 PROCEDURE — 80053 COMPREHEN METABOLIC PANEL: CPT

## 2023-11-07 PROCEDURE — 36430 TRANSFUSION BLD/BLD COMPNT: CPT

## 2023-11-07 PROCEDURE — 99285 EMERGENCY DEPT VISIT HI MDM: CPT | Mod: 25

## 2023-11-07 PROCEDURE — P9040: CPT

## 2023-11-07 NOTE — ED PROVIDER NOTE - OBJECTIVE STATEMENT
78yF uterine cancer p/w severe anemia, sent in from SNF for Hgb 6.3.  Pt reports ongoing vaginal bleeding, chronic, but denies fatigue, weakness, feeling cold/tired or pallor.  No other bleeding.

## 2023-11-07 NOTE — ED PROVIDER NOTE - PROVIDER TOKENS
FREE:[LAST:[PCP],PHONE:[(   )    -],FAX:[(   )    -],ADDRESS:[your PCP and Gynecologic Oncologist],FOLLOWUP:[1-3 Days],ESTABLISHEDPATIENT:[T]]

## 2023-11-07 NOTE — ED ADULT NURSE NOTE - NSICDXPASTMEDICALHX_GEN_ALL_CORE_FT
PAST MEDICAL HISTORY:  Aortic stenosis     Diastolic CHF     H/O atrial tachycardia     Hypertension     Osteoarthritis     Pulmonary HTN     Uterine cervix cancer      DISCHARGE

## 2023-11-07 NOTE — ED PROVIDER NOTE - CARE PROVIDER_API CALL
PCP,   your PCP and Gynecologic Oncologist  Phone: (   )    -  Fax: (   )    -  Established Patient  Follow Up Time: 1-3 Days

## 2023-11-07 NOTE — ED PROVIDER NOTE - CLINICAL SUMMARY MEDICAL DECISION MAKING FREE TEXT BOX
Labs and EKG were ordered and reviewed, where indicated.  Imaging was ordered and reviewed by me, where indicated.  Appropriate medications for patient's presenting complaints were ordered and effects were reassessed, where indicated.  Patient's records (prior hospital, ED visit, and/or nursing home note) were reviewed, if available.  Additional history was obtained from EMS, family, and/or PCP (where available).  Escalation to admission/observation was considered.  However patient feels much better and patient/parent is comfortable with discharge.  Appropriate follow-up was arranged.     Authored by Dr. Racquel Lopez: s/o to me by Dr. Klerman - 78F from [facility] sig for chronic anemia 2/2 chronic vag bleeding from uterine CA biba from [facility] for anemia Hg 6s requiring transfusion, s/o to me pending transfusion of 1u prbc which was completed w/o issue, pt remained hd stable, d/c back to NH

## 2023-11-07 NOTE — ED ADULT NURSE NOTE - CAS EDN DISCHARGE ASSESSMENT
The patient has been examined and the H&P has been reviewed:    I concur with the findings and no changes have occurred since H&P was written.    Anesthesia/Surgery risks, benefits and alternative options discussed and understood by patient/family.    Pt presenting for elective placement of implantable pulse generator for deep brain stimulation.   -- NPO since midnight  -- stable symptomatology  -- Informed consent obtained  -- Proceed to OR          There are no hospital problems to display for this patient.     Alert and oriented to person, place and time

## 2023-11-07 NOTE — ED PROVIDER NOTE - PATIENT PORTAL LINK FT
You can access the FollowMyHealth Patient Portal offered by [facility] by registering at the following website: [email or web address]. By joining [employer]’s FollowMyHealth portal, you will also be able to view your health information using other applications (apps) compatible with our system.

## 2023-11-07 NOTE — ED ADULT TRIAGE NOTE - NURSING HOMES
[facility], located within [facility] and [facility], [facility]

## 2023-11-07 NOTE — ED ADULT NURSE NOTE - OBJECTIVE STATEMENT
Pt here from [facility] for low h/h , hgb 6.3 as per EMS, denies any pains and felling weak. Pt. looks pale.

## 2023-11-08 VITALS
OXYGEN SATURATION: 97 % | RESPIRATION RATE: 18 BRPM | SYSTOLIC BLOOD PRESSURE: 118 MMHG | HEART RATE: 79 BPM | DIASTOLIC BLOOD PRESSURE: 61 MMHG | TEMPERATURE: 97 F

## 2023-11-08 NOTE — ED ADULT NURSE REASSESSMENT NOTE - NS ED NURSE REASSESS COMMENT FT1
Pt. tolerated blood transfusion without any reaction. Pt has 1iunit PRBC which ended tat 0012am. Pt is still awake, denies pains and is resting now, awaiting evaluation from MD. No distress noted. vital signs checked and recorded.

## 2024-01-04 ENCOUNTER — APPOINTMENT (OUTPATIENT)
Dept: CARDIOLOGY | Facility: CLINIC | Age: 79
End: 2024-01-04

## 2024-01-05 ENCOUNTER — APPOINTMENT (OUTPATIENT)
Dept: GYNECOLOGIC ONCOLOGY | Facility: CLINIC | Age: 79
End: 2024-01-05
Payer: MEDICARE

## 2024-01-05 VITALS — HEIGHT: 66 IN | BODY MASS INDEX: 22.82 KG/M2 | WEIGHT: 142 LBS

## 2024-01-05 DIAGNOSIS — I35.0 NONRHEUMATIC AORTIC (VALVE) STENOSIS: ICD-10-CM

## 2024-01-05 DIAGNOSIS — C55 MALIGNANT NEOPLASM OF UTERUS, PART UNSPECIFIED: ICD-10-CM

## 2024-01-05 PROCEDURE — 99214 OFFICE O/P EST MOD 30 MIN: CPT

## 2024-01-06 ENCOUNTER — EMERGENCY (EMERGENCY)
Facility: HOSPITAL | Age: 79
LOS: 0 days | Discharge: HOME | End: 2024-01-07
Attending: STUDENT IN AN ORGANIZED HEALTH CARE EDUCATION/TRAINING PROGRAM
Payer: MEDICARE

## 2024-01-06 VITALS
TEMPERATURE: 98 F | RESPIRATION RATE: 17 BRPM | WEIGHT: 139.99 LBS | DIASTOLIC BLOOD PRESSURE: 54 MMHG | HEART RATE: 89 BPM | SYSTOLIC BLOOD PRESSURE: 100 MMHG | OXYGEN SATURATION: 98 %

## 2024-01-06 VITALS
SYSTOLIC BLOOD PRESSURE: 100 MMHG | OXYGEN SATURATION: 99 % | DIASTOLIC BLOOD PRESSURE: 58 MMHG | TEMPERATURE: 99 F | RESPIRATION RATE: 16 BRPM | HEART RATE: 88 BPM

## 2024-01-06 DIAGNOSIS — D64.9 ANEMIA, UNSPECIFIED: ICD-10-CM

## 2024-01-06 DIAGNOSIS — Z98.890 OTHER SPECIFIED POSTPROCEDURAL STATES: Chronic | ICD-10-CM

## 2024-01-06 DIAGNOSIS — I11.0 HYPERTENSIVE HEART DISEASE WITH HEART FAILURE: ICD-10-CM

## 2024-01-06 DIAGNOSIS — I50.30 UNSPECIFIED DIASTOLIC (CONGESTIVE) HEART FAILURE: ICD-10-CM

## 2024-01-06 DIAGNOSIS — Z85.43 PERSONAL HISTORY OF MALIGNANT NEOPLASM OF OVARY: ICD-10-CM

## 2024-01-06 LAB
ANION GAP SERPL CALC-SCNC: 9 MMOL/L — SIGNIFICANT CHANGE UP (ref 7–14)
ANION GAP SERPL CALC-SCNC: 9 MMOL/L — SIGNIFICANT CHANGE UP (ref 7–14)
BLD GP AB SCN SERPL QL: SIGNIFICANT CHANGE UP
BLD GP AB SCN SERPL QL: SIGNIFICANT CHANGE UP
BUN SERPL-MCNC: 18 MG/DL — SIGNIFICANT CHANGE UP (ref 10–20)
BUN SERPL-MCNC: 18 MG/DL — SIGNIFICANT CHANGE UP (ref 10–20)
CALCIUM SERPL-MCNC: 8.3 MG/DL — LOW (ref 8.4–10.4)
CALCIUM SERPL-MCNC: 8.3 MG/DL — LOW (ref 8.4–10.4)
CHLORIDE SERPL-SCNC: 105 MMOL/L — SIGNIFICANT CHANGE UP (ref 98–110)
CHLORIDE SERPL-SCNC: 105 MMOL/L — SIGNIFICANT CHANGE UP (ref 98–110)
CO2 SERPL-SCNC: 26 MMOL/L — SIGNIFICANT CHANGE UP (ref 17–32)
CO2 SERPL-SCNC: 26 MMOL/L — SIGNIFICANT CHANGE UP (ref 17–32)
CREAT SERPL-MCNC: 0.9 MG/DL — SIGNIFICANT CHANGE UP (ref 0.7–1.5)
CREAT SERPL-MCNC: 0.9 MG/DL — SIGNIFICANT CHANGE UP (ref 0.7–1.5)
EGFR: 65 ML/MIN/1.73M2 — SIGNIFICANT CHANGE UP
EGFR: 65 ML/MIN/1.73M2 — SIGNIFICANT CHANGE UP
GLUCOSE SERPL-MCNC: 133 MG/DL — HIGH (ref 70–99)
GLUCOSE SERPL-MCNC: 133 MG/DL — HIGH (ref 70–99)
HCT VFR BLD CALC: 22.2 % — LOW (ref 37–47)
HCT VFR BLD CALC: 22.2 % — LOW (ref 37–47)
HGB BLD-MCNC: 6.6 G/DL — CRITICAL LOW (ref 12–16)
HGB BLD-MCNC: 6.6 G/DL — CRITICAL LOW (ref 12–16)
MCHC RBC-ENTMCNC: 24 PG — LOW (ref 27–31)
MCHC RBC-ENTMCNC: 24 PG — LOW (ref 27–31)
MCHC RBC-ENTMCNC: 29.7 G/DL — LOW (ref 32–37)
MCHC RBC-ENTMCNC: 29.7 G/DL — LOW (ref 32–37)
MCV RBC AUTO: 80.7 FL — LOW (ref 81–99)
MCV RBC AUTO: 80.7 FL — LOW (ref 81–99)
NRBC # BLD: 0 /100 WBCS — SIGNIFICANT CHANGE UP (ref 0–0)
NRBC # BLD: 0 /100 WBCS — SIGNIFICANT CHANGE UP (ref 0–0)
PLATELET # BLD AUTO: 441 K/UL — HIGH (ref 130–400)
PLATELET # BLD AUTO: 441 K/UL — HIGH (ref 130–400)
PMV BLD: 9.9 FL — SIGNIFICANT CHANGE UP (ref 7.4–10.4)
PMV BLD: 9.9 FL — SIGNIFICANT CHANGE UP (ref 7.4–10.4)
POTASSIUM SERPL-MCNC: 4.4 MMOL/L — SIGNIFICANT CHANGE UP (ref 3.5–5)
POTASSIUM SERPL-MCNC: 4.4 MMOL/L — SIGNIFICANT CHANGE UP (ref 3.5–5)
POTASSIUM SERPL-SCNC: 4.4 MMOL/L — SIGNIFICANT CHANGE UP (ref 3.5–5)
POTASSIUM SERPL-SCNC: 4.4 MMOL/L — SIGNIFICANT CHANGE UP (ref 3.5–5)
RBC # BLD: 2.75 M/UL — LOW (ref 4.2–5.4)
RBC # BLD: 2.75 M/UL — LOW (ref 4.2–5.4)
RBC # FLD: 19.6 % — HIGH (ref 11.5–14.5)
RBC # FLD: 19.6 % — HIGH (ref 11.5–14.5)
SODIUM SERPL-SCNC: 140 MMOL/L — SIGNIFICANT CHANGE UP (ref 135–146)
SODIUM SERPL-SCNC: 140 MMOL/L — SIGNIFICANT CHANGE UP (ref 135–146)
WBC # BLD: 5.7 K/UL — SIGNIFICANT CHANGE UP (ref 4.8–10.8)
WBC # BLD: 5.7 K/UL — SIGNIFICANT CHANGE UP (ref 4.8–10.8)
WBC # FLD AUTO: 5.7 K/UL — SIGNIFICANT CHANGE UP (ref 4.8–10.8)
WBC # FLD AUTO: 5.7 K/UL — SIGNIFICANT CHANGE UP (ref 4.8–10.8)

## 2024-01-06 PROCEDURE — 80048 BASIC METABOLIC PNL TOTAL CA: CPT

## 2024-01-06 PROCEDURE — 99285 EMERGENCY DEPT VISIT HI MDM: CPT | Mod: 25

## 2024-01-06 PROCEDURE — 86900 BLOOD TYPING SEROLOGIC ABO: CPT

## 2024-01-06 PROCEDURE — 36430 TRANSFUSION BLD/BLD COMPNT: CPT

## 2024-01-06 PROCEDURE — 86923 COMPATIBILITY TEST ELECTRIC: CPT

## 2024-01-06 PROCEDURE — 86901 BLOOD TYPING SEROLOGIC RH(D): CPT

## 2024-01-06 PROCEDURE — 36415 COLL VENOUS BLD VENIPUNCTURE: CPT

## 2024-01-06 PROCEDURE — 86850 RBC ANTIBODY SCREEN: CPT

## 2024-01-06 PROCEDURE — 85027 COMPLETE CBC AUTOMATED: CPT

## 2024-01-06 PROCEDURE — 99285 EMERGENCY DEPT VISIT HI MDM: CPT | Mod: FS

## 2024-01-06 PROCEDURE — P9016: CPT

## 2024-01-06 NOTE — ED ADULT NURSE NOTE - NS ED NRS NURSING HOMES
[facility] and [facility], [facility] and [facility], [facility]

## 2024-01-06 NOTE — ED ADULT NURSE NOTE - NSFALLHARMRISKINTERV_ED_ALL_ED
Assistance OOB with selected safe patient handling equipment if applicable/Assistance with ambulation/Communicate risk of Fall with Harm to all staff, patient, and family/Monitor gait and stability/Provide visual cue: red socks, yellow wristband, yellow gown, etc/Reinforce activity limits and safety measures with patient and family/Bed in lowest position, wheels locked, appropriate side rails in place/Call bell, personal items and telephone in reach/Instruct patient to call for assistance before getting out of bed/chair/stretcher/Non-slip footwear applied when patient is off stretcher/[place] to call system/Physically safe environment - no spills, clutter or unnecessary equipment/Purposeful Proactive Rounding/Room/bathroom lighting operational, light cord in reach Assistance OOB with selected safe patient handling equipment if applicable/Assistance with ambulation/Communicate risk of Fall with Harm to all staff, patient, and family/Monitor gait and stability/Provide visual cue: red socks, yellow wristband, yellow gown, etc/Reinforce activity limits and safety measures with patient and family/Bed in lowest position, wheels locked, appropriate side rails in place/Call bell, personal items and telephone in reach/Instruct patient to call for assistance before getting out of bed/chair/stretcher/Non-slip footwear applied when patient is off stretcher/[place] to call system/Physically safe environment - no spills, clutter or unnecessary equipment/Purposeful Proactive Rounding/Room/bathroom lighting operational, light cord in reach

## 2024-01-06 NOTE — ED ADULT TRIAGE NOTE - NURSING HOMES
[facility] and [facility], [facility] and [facility], [facility]

## 2024-01-06 NOTE — ED ADULT TRIAGE NOTE - TEMPERATURE IN FAHRENHEIT (DEGREES F)
97.5 Minoxidil Counseling: Minoxidil is a topical medication which can increase blood flow where it is applied. It is uncertain how this medication increases hair growth. Side effects are uncommon and include stinging and allergic reactions.

## 2024-01-06 NOTE — ED PROVIDER NOTE - CLINICAL SUMMARY MEDICAL DECISION MAKING FREE TEXT BOX
Patient presented today with abnormal hemoglobin.  Patient has known history of cancer and has chronic anemia.  Patient given 1 unit of PRBC and tolerated transfusion well.  Patient has no symptomatology.  Patient was discharged to close follow-up with PMD.  Return precaution explained to patient.

## 2024-01-10 PROBLEM — I35.0 AORTIC STENOSIS: Status: ACTIVE | Noted: 2023-05-05

## 2024-01-10 PROBLEM — C55 ENDOMETRIOID ADENOCARCINOMA OF UTERUS: Status: ACTIVE | Noted: 2023-05-16

## 2024-01-10 NOTE — DISCUSSION/SUMMARY
[Visit Time ___ Minutes] : [unfilled] minutes [Face to Face Time___ Minutes] : with [unfilled] minutes in face to face consultation. [FreeTextEntry1] : Again reviewed all prior information, had previously offered surgery to patient provided she was medically optimized. Irrespective of her medical condition she refuses to have any surgical intervention. She is however bothered by the bleeding and is willing to undergo palliative radiotherapy.  All questions answered, will follow up as needed.

## 2024-01-10 NOTE — HISTORY OF PRESENT ILLNESS
[FreeTextEntry1] : 77 y/o  presents with 2-3 years of PMB. Recently seen in the hospital and had EMBx done in 4/2023 which showed endometrial cancer.    She has multiple co morbidities including severe AS, venous stasis and HTN, prior localized breast ca many years prior (aprox 30)  PSH no abdominal surgery FHx M with breast ca, Father with lung/prostate ca, B with H&N ca    Seen by cardiology , underwent baboon angioplasty without any significant change in her Sx She is in a nursing home, but describes her activity level as limited, she has no CP with her PT.  Continues to have vaginal bleeding almost daily.  Had refused surgery in 7/2023 and sent to radiation oncology, but for unclear reasons has not seen them.

## 2024-01-10 NOTE — PHYSICAL EXAM
[Absent] : CVAT: absent [Normal] : Mood and affect: Normal [FreeTextEntry1] : NAD but appears cachectic [de-identified] : TEJINDER [de-identified] : B edema [de-identified] : soft NT/ND [de-identified] : declined pelvic exam [Fully active, able to carry on all pre-disease performance without restriction] : Status 0 - Fully active, able to carry on all pre-disease performance without restriction

## 2024-01-10 NOTE — ASSESSMENT
[FreeTextEntry1] : Endometrial cancer with significant medical co morbidities Anemia with Hb of 9 on records from nursing home.

## 2024-01-11 ENCOUNTER — APPOINTMENT (OUTPATIENT)
Dept: CARDIOLOGY | Facility: CLINIC | Age: 79
End: 2024-01-11

## 2024-09-27 NOTE — H&P ADULT - NSICDXPASTMEDICALHX_GEN_ALL_CORE_FT
PAST MEDICAL HISTORY:  Aortic stenosis     H/O atrial tachycardia     Hypertension     Osteoarthritis     
Bed/Stretcher in lowest position, wheels locked, appropriate side rails in place/Call bell, personal items and telephone in reach/Instruct patient to call for assistance before getting out of bed/chair/stretcher/Non-slip footwear applied when patient is off stretcher/[place] to call system/Physically safe environment - no spills, clutter or unnecessary equipment/Purposeful proactive rounding/Room/bathroom lighting operational, light cord in reach

## 2025-04-12 NOTE — DIETITIAN INITIAL EVALUATION ADULT - WEIGHT FOR BMI (LBS)
Goal Outcome Evaluation:      Plan of Care Reviewed With: patient, child    Overall Patient Progress: improvingOverall Patient Progress: improving    Outcome Evaluation: transferred from PACU to  w/ Dilaudid PCA    Status: Lumbar 4-5 Full Laminectomy and Bilateral Microdiscectomy   Vitals: /74 (BP Location: Right arm, Patient Position: Semi-Sahni's, Cuff Size: Adult Regular)   Pulse 52   Temp 98  F (36.7  C) (Oral)   Resp 18   Wt 62.6 kg (138 lb)   LMP 11/01/1999 (Exact Date)   SpO2 96%   BMI 20.38 kg/m    SBP goal:   Neuros: Aox4. Pleasant. BUE 5/5, BLE 4/5. N/T around incision (new) and LLE (baseline). Is able to make needs known and calls appropriately.   IV: PIV SL. Infusing NS w/ Dilaudid PCA (Tolerant) Pump.   Labs:   Resp: RA. Denied SOB  Diet: Regular w/ good intake. Zofran given once.   : voids spont.   GI: LBM PTA  Skin:   - Lumbar incision covered with primapore CDI- changed by Reagan Wills / Provider  - Blanchable redness on ribs and L thigh from OR positioning  Pain: PCA pump DPRVS 5-8, controlled with PRN robaxin, attarax, valium, scheduled tylenol.   Activity: up w/ 1. Ambulated to bathroom in room.   Social: Child visited, attentive and supportive to patient.   Plan: continue w/ POC    Continuous Rate: 0.1 mg/hr    PCA Dose: 0.1 mg    PCA Lockout: 10 Minutes    One Hour Limit: 0.7 mg    Clinician Bolus (one time dose): 0 mg              200.6

## 2025-04-14 NOTE — ED PROVIDER NOTE - OBTAINED AND REVIEWED OLD RECORDS MULTI-SELECT OPTIONS
Stable currently no worsening breathing issues with the use of Advair 500 mg          Skilled Nursing Facility Notes